# Patient Record
Sex: FEMALE | Race: WHITE | Employment: OTHER | URBAN - NONMETROPOLITAN AREA
[De-identification: names, ages, dates, MRNs, and addresses within clinical notes are randomized per-mention and may not be internally consistent; named-entity substitution may affect disease eponyms.]

---

## 2019-06-01 ENCOUNTER — ANESTHESIA (OUTPATIENT)
Dept: OPERATING ROOM | Age: 84
DRG: 329 | End: 2019-06-01
Payer: MEDICARE

## 2019-06-01 ENCOUNTER — APPOINTMENT (OUTPATIENT)
Dept: GENERAL RADIOLOGY | Age: 84
DRG: 329 | End: 2019-06-01
Attending: SURGERY
Payer: MEDICARE

## 2019-06-01 ENCOUNTER — ANESTHESIA EVENT (OUTPATIENT)
Dept: OPERATING ROOM | Age: 84
DRG: 329 | End: 2019-06-01
Payer: MEDICARE

## 2019-06-01 ENCOUNTER — HOSPITAL ENCOUNTER (INPATIENT)
Age: 84
LOS: 11 days | Discharge: NURSING, REHAB OR LONG-TERM CARE | DRG: 329 | End: 2019-06-12
Attending: SURGERY | Admitting: SURGERY
Payer: MEDICARE

## 2019-06-01 VITALS
SYSTOLIC BLOOD PRESSURE: 112 MMHG | OXYGEN SATURATION: 100 % | RESPIRATION RATE: 5 BRPM | DIASTOLIC BLOOD PRESSURE: 53 MMHG

## 2019-06-01 PROBLEM — K26.5 PERFORATED DUODENAL ULCER (HCC): Status: ACTIVE | Noted: 2019-06-01

## 2019-06-01 PROBLEM — N18.9 CKD (CHRONIC KIDNEY DISEASE): Chronic | Status: ACTIVE | Noted: 2019-06-01

## 2019-06-01 PROBLEM — N17.9 AKI (ACUTE KIDNEY INJURY) (HCC): Status: ACTIVE | Noted: 2019-06-01

## 2019-06-01 PROBLEM — I10 ESSENTIAL HYPERTENSION: Chronic | Status: ACTIVE | Noted: 2019-06-01

## 2019-06-01 PROBLEM — K63.1 BOWEL PERFORATION (HCC): Status: ACTIVE | Noted: 2019-06-01

## 2019-06-01 LAB
ABO/RH: NORMAL
ALBUMIN SERPL-MCNC: 2.5 G/DL (ref 3.5–5.2)
ALP BLD-CCNC: 33 U/L (ref 35–104)
ALT SERPL-CCNC: 7 U/L (ref 5–33)
ANION GAP SERPL CALCULATED.3IONS-SCNC: 11 MMOL/L (ref 7–19)
ANION GAP SERPL CALCULATED.3IONS-SCNC: 11 MMOL/L (ref 7–19)
ANTIBODY SCREEN: NORMAL
AST SERPL-CCNC: 20 U/L (ref 5–32)
ATYPICAL LYMPHOCYTE RELATIVE PERCENT: 2 % (ref 0–8)
BANDED NEUTROPHILS RELATIVE PERCENT: 18 % (ref 0–5)
BASOPHILS ABSOLUTE: 0 K/UL (ref 0–0.2)
BASOPHILS RELATIVE PERCENT: 0 % (ref 0–1)
BILIRUB SERPL-MCNC: 0.5 MG/DL (ref 0.2–1.2)
BUN BLDV-MCNC: 19 MG/DL (ref 8–23)
BUN BLDV-MCNC: 20 MG/DL (ref 8–23)
BURR CELLS: ABNORMAL
CALCIUM SERPL-MCNC: 7.6 MG/DL (ref 8.8–10.2)
CALCIUM SERPL-MCNC: 7.9 MG/DL (ref 8.8–10.2)
CHLORIDE BLD-SCNC: 102 MMOL/L (ref 98–111)
CHLORIDE BLD-SCNC: 97 MMOL/L (ref 98–111)
CO2: 20 MMOL/L (ref 22–29)
CO2: 22 MMOL/L (ref 22–29)
CREAT SERPL-MCNC: 1.6 MG/DL (ref 0.5–0.9)
CREAT SERPL-MCNC: 1.6 MG/DL (ref 0.5–0.9)
EOSINOPHILS ABSOLUTE: 0 K/UL (ref 0–0.6)
EOSINOPHILS RELATIVE PERCENT: 0 % (ref 0–5)
GFR NON-AFRICAN AMERICAN: 30
GFR NON-AFRICAN AMERICAN: 30
GLUCOSE BLD-MCNC: 113 MG/DL (ref 74–109)
GLUCOSE BLD-MCNC: 138 MG/DL (ref 74–109)
HCT VFR BLD CALC: 25.3 % (ref 37–47)
HCT VFR BLD CALC: 28.3 % (ref 37–47)
HEMOGLOBIN: 7.8 G/DL (ref 12–16)
HEMOGLOBIN: 8.9 G/DL (ref 12–16)
HYPOCHROMIA: ABNORMAL
LYMPHOCYTES ABSOLUTE: 1.6 K/UL (ref 1.1–4.5)
LYMPHOCYTES RELATIVE PERCENT: 10 % (ref 20–40)
MCH RBC QN AUTO: 26.1 PG (ref 27–31)
MCH RBC QN AUTO: 26.7 PG (ref 27–31)
MCHC RBC AUTO-ENTMCNC: 30.8 G/DL (ref 33–37)
MCHC RBC AUTO-ENTMCNC: 31.4 G/DL (ref 33–37)
MCV RBC AUTO: 84.6 FL (ref 81–99)
MCV RBC AUTO: 85 FL (ref 81–99)
MONOCYTES ABSOLUTE: 0.5 K/UL (ref 0–0.9)
MONOCYTES RELATIVE PERCENT: 4 % (ref 0–10)
MYELOCYTE PERCENT: 3 %
NEUTROPHILS ABSOLUTE: 11.3 K/UL (ref 1.5–7.5)
NEUTROPHILS RELATIVE PERCENT: 63 % (ref 50–65)
OVALOCYTES: ABNORMAL
PDW BLD-RTO: 13.8 % (ref 11.5–14.5)
PDW BLD-RTO: 13.9 % (ref 11.5–14.5)
PLATELET # BLD: 333 K/UL (ref 130–400)
PLATELET # BLD: 410 K/UL (ref 130–400)
PLATELET SLIDE REVIEW: ADEQUATE
PMV BLD AUTO: 8.6 FL (ref 9.4–12.3)
PMV BLD AUTO: 9 FL (ref 9.4–12.3)
POLYCHROMASIA: ABNORMAL
POTASSIUM REFLEX MAGNESIUM: 4.5 MMOL/L (ref 3.5–5)
POTASSIUM SERPL-SCNC: 3.6 MMOL/L (ref 3.5–4.9)
RBC # BLD: 2.99 M/UL (ref 4.2–5.4)
RBC # BLD: 3.33 M/UL (ref 4.2–5.4)
SODIUM BLD-SCNC: 130 MMOL/L (ref 136–145)
SODIUM BLD-SCNC: 133 MMOL/L (ref 136–145)
TOTAL PROTEIN: 4.6 G/DL (ref 6.6–8.7)
WBC # BLD: 13.4 K/UL (ref 4.8–10.8)
WBC # BLD: 7.5 K/UL (ref 4.8–10.8)

## 2019-06-01 PROCEDURE — 2000000000 HC ICU R&B

## 2019-06-01 PROCEDURE — 7100000000 HC PACU RECOVERY - FIRST 15 MIN: Performed by: SURGERY

## 2019-06-01 PROCEDURE — 6360000002 HC RX W HCPCS: Performed by: NURSE ANESTHETIST, CERTIFIED REGISTERED

## 2019-06-01 PROCEDURE — 6360000002 HC RX W HCPCS: Performed by: SURGERY

## 2019-06-01 PROCEDURE — 3700000000 HC ANESTHESIA ATTENDED CARE: Performed by: SURGERY

## 2019-06-01 PROCEDURE — 2500000003 HC RX 250 WO HCPCS: Performed by: NURSE ANESTHETIST, CERTIFIED REGISTERED

## 2019-06-01 PROCEDURE — 2580000003 HC RX 258: Performed by: HOSPITALIST

## 2019-06-01 PROCEDURE — 36415 COLL VENOUS BLD VENIPUNCTURE: CPT

## 2019-06-01 PROCEDURE — 86850 RBC ANTIBODY SCREEN: CPT

## 2019-06-01 PROCEDURE — 7100000001 HC PACU RECOVERY - ADDTL 15 MIN: Performed by: SURGERY

## 2019-06-01 PROCEDURE — P9045 ALBUMIN (HUMAN), 5%, 250 ML: HCPCS | Performed by: NURSE ANESTHETIST, CERTIFIED REGISTERED

## 2019-06-01 PROCEDURE — 2700000000 HC OXYGEN THERAPY PER DAY

## 2019-06-01 PROCEDURE — 3600000004 HC SURGERY LEVEL 4 BASE: Performed by: SURGERY

## 2019-06-01 PROCEDURE — 43840 GSTRRPHY SUTR DUOL/GSTR ULCR: CPT | Performed by: SURGERY

## 2019-06-01 PROCEDURE — 02HV33Z INSERTION OF INFUSION DEVICE INTO SUPERIOR VENA CAVA, PERCUTANEOUS APPROACH: ICD-10-PCS | Performed by: SURGERY

## 2019-06-01 PROCEDURE — C9113 INJ PANTOPRAZOLE SODIUM, VIA: HCPCS | Performed by: SURGERY

## 2019-06-01 PROCEDURE — 80053 COMPREHEN METABOLIC PANEL: CPT

## 2019-06-01 PROCEDURE — 86900 BLOOD TYPING SEROLOGIC ABO: CPT

## 2019-06-01 PROCEDURE — 2580000003 HC RX 258: Performed by: SURGERY

## 2019-06-01 PROCEDURE — 2580000003 HC RX 258: Performed by: NURSE ANESTHETIST, CERTIFIED REGISTERED

## 2019-06-01 PROCEDURE — 2709999900 HC NON-CHARGEABLE SUPPLY: Performed by: SURGERY

## 2019-06-01 PROCEDURE — 85027 COMPLETE CBC AUTOMATED: CPT

## 2019-06-01 PROCEDURE — 85025 COMPLETE CBC W/AUTO DIFF WBC: CPT

## 2019-06-01 PROCEDURE — 86901 BLOOD TYPING SEROLOGIC RH(D): CPT

## 2019-06-01 PROCEDURE — 71045 X-RAY EXAM CHEST 1 VIEW: CPT

## 2019-06-01 PROCEDURE — 99222 1ST HOSP IP/OBS MODERATE 55: CPT | Performed by: SURGERY

## 2019-06-01 PROCEDURE — 0DU907Z SUPPLEMENT DUODENUM WITH AUTOLOGOUS TISSUE SUBSTITUTE, OPEN APPROACH: ICD-10-PCS | Performed by: SURGERY

## 2019-06-01 PROCEDURE — 2500000003 HC RX 250 WO HCPCS: Performed by: HOSPITALIST

## 2019-06-01 PROCEDURE — 3700000001 HC ADD 15 MINUTES (ANESTHESIA): Performed by: SURGERY

## 2019-06-01 PROCEDURE — 3600000014 HC SURGERY LEVEL 4 ADDTL 15MIN: Performed by: SURGERY

## 2019-06-01 PROCEDURE — 99291 CRITICAL CARE FIRST HOUR: CPT | Performed by: HOSPITALIST

## 2019-06-01 RX ORDER — MORPHINE SULFATE 2 MG/ML
4 INJECTION, SOLUTION INTRAMUSCULAR; INTRAVENOUS EVERY 5 MIN PRN
Status: DISCONTINUED | OUTPATIENT
Start: 2019-06-01 | End: 2019-06-01 | Stop reason: HOSPADM

## 2019-06-01 RX ORDER — DEXTROSE, SODIUM CHLORIDE, SODIUM LACTATE, POTASSIUM CHLORIDE, AND CALCIUM CHLORIDE 5; .6; .31; .03; .02 G/100ML; G/100ML; G/100ML; G/100ML; G/100ML
INJECTION, SOLUTION INTRAVENOUS CONTINUOUS
Status: DISCONTINUED | OUTPATIENT
Start: 2019-06-01 | End: 2019-06-02

## 2019-06-01 RX ORDER — ONDANSETRON 2 MG/ML
4 INJECTION INTRAMUSCULAR; INTRAVENOUS EVERY 6 HOURS PRN
Status: DISCONTINUED | OUTPATIENT
Start: 2019-06-01 | End: 2019-06-12 | Stop reason: HOSPADM

## 2019-06-01 RX ORDER — SUCCINYLCHOLINE/SOD CL,ISO/PF 100 MG/5ML
SYRINGE (ML) INTRAVENOUS PRN
Status: DISCONTINUED | OUTPATIENT
Start: 2019-06-01 | End: 2019-06-01 | Stop reason: SDUPTHER

## 2019-06-01 RX ORDER — SODIUM CHLORIDE 0.9 % (FLUSH) 0.9 %
10 SYRINGE (ML) INJECTION PRN
Status: CANCELLED | OUTPATIENT
Start: 2019-06-01

## 2019-06-01 RX ORDER — ROCURONIUM BROMIDE 10 MG/ML
INJECTION, SOLUTION INTRAVENOUS PRN
Status: DISCONTINUED | OUTPATIENT
Start: 2019-06-01 | End: 2019-06-01 | Stop reason: SDUPTHER

## 2019-06-01 RX ORDER — MEPERIDINE HYDROCHLORIDE 50 MG/ML
12.5 INJECTION INTRAMUSCULAR; INTRAVENOUS; SUBCUTANEOUS EVERY 5 MIN PRN
Status: DISCONTINUED | OUTPATIENT
Start: 2019-06-01 | End: 2019-06-01 | Stop reason: HOSPADM

## 2019-06-01 RX ORDER — CEFAZOLIN SODIUM 1 G/50ML
1 INJECTION, SOLUTION INTRAVENOUS EVERY 8 HOURS
Status: DISCONTINUED | OUTPATIENT
Start: 2019-06-02 | End: 2019-06-03

## 2019-06-01 RX ORDER — METOCLOPRAMIDE HYDROCHLORIDE 5 MG/ML
10 INJECTION INTRAMUSCULAR; INTRAVENOUS
Status: DISCONTINUED | OUTPATIENT
Start: 2019-06-01 | End: 2019-06-01 | Stop reason: HOSPADM

## 2019-06-01 RX ORDER — LIDOCAINE HYDROCHLORIDE 10 MG/ML
INJECTION, SOLUTION INFILTRATION; PERINEURAL PRN
Status: DISCONTINUED | OUTPATIENT
Start: 2019-06-01 | End: 2019-06-01 | Stop reason: SDUPTHER

## 2019-06-01 RX ORDER — PROMETHAZINE HYDROCHLORIDE 25 MG/ML
6.25 INJECTION, SOLUTION INTRAMUSCULAR; INTRAVENOUS
Status: DISCONTINUED | OUTPATIENT
Start: 2019-06-01 | End: 2019-06-01 | Stop reason: HOSPADM

## 2019-06-01 RX ORDER — SODIUM CHLORIDE, SODIUM LACTATE, POTASSIUM CHLORIDE, AND CALCIUM CHLORIDE .6; .31; .03; .02 G/100ML; G/100ML; G/100ML; G/100ML
500 INJECTION, SOLUTION INTRAVENOUS ONCE
Status: COMPLETED | OUTPATIENT
Start: 2019-06-01 | End: 2019-06-01

## 2019-06-01 RX ORDER — SODIUM CHLORIDE 0.9 % (FLUSH) 0.9 %
10 SYRINGE (ML) INJECTION EVERY 12 HOURS SCHEDULED
Status: CANCELLED | OUTPATIENT
Start: 2019-06-01

## 2019-06-01 RX ORDER — SODIUM CHLORIDE 0.9 % (FLUSH) 0.9 %
10 SYRINGE (ML) INJECTION PRN
Status: DISCONTINUED | OUTPATIENT
Start: 2019-06-01 | End: 2019-06-12 | Stop reason: HOSPADM

## 2019-06-01 RX ORDER — ALBUMIN, HUMAN INJ 5% 5 %
SOLUTION INTRAVENOUS PRN
Status: DISCONTINUED | OUTPATIENT
Start: 2019-06-01 | End: 2019-06-01 | Stop reason: SDUPTHER

## 2019-06-01 RX ORDER — FENTANYL CITRATE 50 UG/ML
INJECTION, SOLUTION INTRAMUSCULAR; INTRAVENOUS PRN
Status: DISCONTINUED | OUTPATIENT
Start: 2019-06-01 | End: 2019-06-01 | Stop reason: SDUPTHER

## 2019-06-01 RX ORDER — MORPHINE SULFATE 2 MG/ML
2 INJECTION, SOLUTION INTRAMUSCULAR; INTRAVENOUS EVERY 30 MIN PRN
Status: DISCONTINUED | OUTPATIENT
Start: 2019-06-01 | End: 2019-06-04

## 2019-06-01 RX ORDER — LOSARTAN POTASSIUM 50 MG/1
50 TABLET ORAL DAILY
COMMUNITY

## 2019-06-01 RX ORDER — SODIUM CHLORIDE, SODIUM LACTATE, POTASSIUM CHLORIDE, CALCIUM CHLORIDE 600; 310; 30; 20 MG/100ML; MG/100ML; MG/100ML; MG/100ML
INJECTION, SOLUTION INTRAVENOUS CONTINUOUS
Status: DISCONTINUED | OUTPATIENT
Start: 2019-06-01 | End: 2019-06-01

## 2019-06-01 RX ORDER — 0.9 % SODIUM CHLORIDE 0.9 %
1000 INTRAVENOUS SOLUTION INTRAVENOUS ONCE
Status: COMPLETED | OUTPATIENT
Start: 2019-06-01 | End: 2019-06-01

## 2019-06-01 RX ORDER — SODIUM CHLORIDE 9 MG/ML
INJECTION, SOLUTION INTRAVENOUS CONTINUOUS PRN
Status: DISCONTINUED | OUTPATIENT
Start: 2019-06-01 | End: 2019-06-01 | Stop reason: SDUPTHER

## 2019-06-01 RX ORDER — PROPOFOL 10 MG/ML
INJECTION, EMULSION INTRAVENOUS PRN
Status: DISCONTINUED | OUTPATIENT
Start: 2019-06-01 | End: 2019-06-01 | Stop reason: SDUPTHER

## 2019-06-01 RX ORDER — DEXAMETHASONE SODIUM PHOSPHATE 4 MG/ML
INJECTION, SOLUTION INTRA-ARTICULAR; INTRALESIONAL; INTRAMUSCULAR; INTRAVENOUS; SOFT TISSUE PRN
Status: DISCONTINUED | OUTPATIENT
Start: 2019-06-01 | End: 2019-06-01 | Stop reason: SDUPTHER

## 2019-06-01 RX ORDER — MIDAZOLAM HYDROCHLORIDE 1 MG/ML
INJECTION INTRAMUSCULAR; INTRAVENOUS PRN
Status: DISCONTINUED | OUTPATIENT
Start: 2019-06-01 | End: 2019-06-01 | Stop reason: SDUPTHER

## 2019-06-01 RX ORDER — MORPHINE SULFATE 2 MG/ML
2 INJECTION, SOLUTION INTRAMUSCULAR; INTRAVENOUS EVERY 5 MIN PRN
Status: DISCONTINUED | OUTPATIENT
Start: 2019-06-01 | End: 2019-06-01 | Stop reason: HOSPADM

## 2019-06-01 RX ORDER — METOPROLOL TARTRATE 5 MG/5ML
2.5 INJECTION INTRAVENOUS EVERY 6 HOURS
Status: DISCONTINUED | OUTPATIENT
Start: 2019-06-01 | End: 2019-06-06

## 2019-06-01 RX ORDER — MORPHINE SULFATE 10 MG/ML
INJECTION, SOLUTION INTRAMUSCULAR; INTRAVENOUS PRN
Status: DISCONTINUED | OUTPATIENT
Start: 2019-06-01 | End: 2019-06-01 | Stop reason: SDUPTHER

## 2019-06-01 RX ORDER — 0.9 % SODIUM CHLORIDE 0.9 %
500 INTRAVENOUS SOLUTION INTRAVENOUS ONCE
Status: COMPLETED | OUTPATIENT
Start: 2019-06-01 | End: 2019-06-01

## 2019-06-01 RX ORDER — DIPHENHYDRAMINE HYDROCHLORIDE 50 MG/ML
12.5 INJECTION INTRAMUSCULAR; INTRAVENOUS
Status: DISCONTINUED | OUTPATIENT
Start: 2019-06-01 | End: 2019-06-01 | Stop reason: HOSPADM

## 2019-06-01 RX ORDER — SODIUM CHLORIDE 0.9 % (FLUSH) 0.9 %
10 SYRINGE (ML) INJECTION EVERY 12 HOURS SCHEDULED
Status: DISCONTINUED | OUTPATIENT
Start: 2019-06-01 | End: 2019-06-12 | Stop reason: HOSPADM

## 2019-06-01 RX ADMIN — SODIUM CHLORIDE, SODIUM LACTATE, POTASSIUM CHLORIDE, AND CALCIUM CHLORIDE: 600; 310; 30; 20 INJECTION, SOLUTION INTRAVENOUS at 12:14

## 2019-06-01 RX ADMIN — MORPHINE SULFATE 4 MG: 10 INJECTION INTRAMUSCULAR; INTRAVENOUS; SUBCUTANEOUS at 10:25

## 2019-06-01 RX ADMIN — DEXAMETHASONE SODIUM PHOSPHATE 4 MG: 4 INJECTION, SOLUTION INTRAMUSCULAR; INTRAVENOUS at 08:51

## 2019-06-01 RX ADMIN — ROCURONIUM BROMIDE 30 MG: 10 INJECTION INTRAVENOUS at 08:45

## 2019-06-01 RX ADMIN — SODIUM CHLORIDE: 9 INJECTION, SOLUTION INTRAVENOUS at 08:58

## 2019-06-01 RX ADMIN — SUGAMMADEX 140 MG: 100 INJECTION, SOLUTION INTRAVENOUS at 10:08

## 2019-06-01 RX ADMIN — FENTANYL CITRATE 25 MCG: 50 INJECTION INTRAMUSCULAR; INTRAVENOUS at 09:19

## 2019-06-01 RX ADMIN — PROPOFOL 50 MG: 10 INJECTION, EMULSION INTRAVENOUS at 08:41

## 2019-06-01 RX ADMIN — LIDOCAINE HYDROCHLORIDE 5 ML: 10 INJECTION, SOLUTION INFILTRATION; PERINEURAL at 08:41

## 2019-06-01 RX ADMIN — METOPROLOL TARTRATE 2.5 MG: 5 INJECTION, SOLUTION INTRAVENOUS at 22:44

## 2019-06-01 RX ADMIN — ALBUMIN (HUMAN) 250 ML: 2.5 SOLUTION INTRAVENOUS at 09:32

## 2019-06-01 RX ADMIN — PHENYLEPHRINE HYDROCHLORIDE 100 MCG: 10 INJECTION INTRAVENOUS at 08:44

## 2019-06-01 RX ADMIN — PANTOPRAZOLE SODIUM 8 MG/HR: 40 INJECTION, POWDER, FOR SOLUTION INTRAVENOUS at 22:42

## 2019-06-01 RX ADMIN — ALBUMIN (HUMAN) 250 ML: 2.5 SOLUTION INTRAVENOUS at 09:03

## 2019-06-01 RX ADMIN — CEFAZOLIN 1 G: 1 INJECTION, POWDER, FOR SOLUTION INTRAMUSCULAR; INTRAVENOUS at 08:55

## 2019-06-01 RX ADMIN — MIDAZOLAM 1 MG: 1 INJECTION INTRAMUSCULAR; INTRAVENOUS at 08:35

## 2019-06-01 RX ADMIN — MORPHINE SULFATE 3 MG: 10 INJECTION INTRAMUSCULAR; INTRAVENOUS; SUBCUTANEOUS at 10:21

## 2019-06-01 RX ADMIN — METOPROLOL TARTRATE 2.5 MG: 5 INJECTION, SOLUTION INTRAVENOUS at 17:42

## 2019-06-01 RX ADMIN — SODIUM CHLORIDE 500 ML: 9 INJECTION, SOLUTION INTRAVENOUS at 15:22

## 2019-06-01 RX ADMIN — MORPHINE SULFATE 2 MG: 2 INJECTION, SOLUTION INTRAMUSCULAR; INTRAVENOUS at 15:14

## 2019-06-01 RX ADMIN — SODIUM CHLORIDE, POTASSIUM CHLORIDE, SODIUM LACTATE AND CALCIUM CHLORIDE 500 ML: 600; 310; 30; 20 INJECTION, SOLUTION INTRAVENOUS at 20:41

## 2019-06-01 RX ADMIN — ONDANSETRON 4 MG: 2 INJECTION INTRAMUSCULAR; INTRAVENOUS at 12:12

## 2019-06-01 RX ADMIN — SODIUM CHLORIDE: 9 INJECTION, SOLUTION INTRAVENOUS at 10:04

## 2019-06-01 RX ADMIN — PANTOPRAZOLE SODIUM 8 MG/HR: 40 INJECTION, POWDER, FOR SOLUTION INTRAVENOUS at 14:35

## 2019-06-01 RX ADMIN — ONDANSETRON 4 MG: 2 INJECTION INTRAMUSCULAR; INTRAVENOUS at 06:32

## 2019-06-01 RX ADMIN — SODIUM CHLORIDE, SODIUM LACTATE, POTASSIUM CHLORIDE, AND CALCIUM CHLORIDE: 600; 310; 30; 20 INJECTION, SOLUTION INTRAVENOUS at 05:54

## 2019-06-01 RX ADMIN — PHENYLEPHRINE HYDROCHLORIDE 100 MCG: 10 INJECTION INTRAVENOUS at 08:41

## 2019-06-01 RX ADMIN — SODIUM CHLORIDE, SODIUM LACTATE, POTASSIUM CHLORIDE, AND CALCIUM CHLORIDE: 600; 310; 30; 20 INJECTION, SOLUTION INTRAVENOUS at 09:00

## 2019-06-01 RX ADMIN — MORPHINE SULFATE 2 MG: 2 INJECTION, SOLUTION INTRAMUSCULAR; INTRAVENOUS at 12:11

## 2019-06-01 RX ADMIN — FENTANYL CITRATE 50 MCG: 50 INJECTION INTRAMUSCULAR; INTRAVENOUS at 09:54

## 2019-06-01 RX ADMIN — PHENYLEPHRINE HYDROCHLORIDE 100 MCG: 10 INJECTION INTRAVENOUS at 09:06

## 2019-06-01 RX ADMIN — MORPHINE SULFATE 3 MG: 10 INJECTION INTRAMUSCULAR; INTRAVENOUS; SUBCUTANEOUS at 10:16

## 2019-06-01 RX ADMIN — Medication 10 ML: at 20:43

## 2019-06-01 RX ADMIN — FENTANYL CITRATE 50 MCG: 50 INJECTION INTRAMUSCULAR; INTRAVENOUS at 09:42

## 2019-06-01 RX ADMIN — SODIUM CHLORIDE, SODIUM LACTATE, POTASSIUM CHLORIDE, CALCIUM CHLORIDE AND DEXTROSE MONOHYDRATE: 5; 600; 310; 30; 20 INJECTION, SOLUTION INTRAVENOUS at 19:06

## 2019-06-01 RX ADMIN — MORPHINE SULFATE 2 MG: 2 INJECTION, SOLUTION INTRAMUSCULAR; INTRAVENOUS at 06:32

## 2019-06-01 RX ADMIN — SODIUM CHLORIDE, SODIUM LACTATE, POTASSIUM CHLORIDE, AND CALCIUM CHLORIDE: 600; 310; 30; 20 INJECTION, SOLUTION INTRAVENOUS at 11:54

## 2019-06-01 RX ADMIN — PHENYLEPHRINE HYDROCHLORIDE 200 MCG: 10 INJECTION INTRAVENOUS at 08:48

## 2019-06-01 RX ADMIN — Medication 80 MG: at 08:41

## 2019-06-01 RX ADMIN — FENTANYL CITRATE 25 MCG: 50 INJECTION INTRAMUSCULAR; INTRAVENOUS at 09:24

## 2019-06-01 RX ADMIN — SODIUM CHLORIDE 1000 ML: 9 INJECTION, SOLUTION INTRAVENOUS at 07:23

## 2019-06-01 ASSESSMENT — PAIN DESCRIPTION - LOCATION
LOCATION: ABDOMEN
LOCATION: ABDOMEN

## 2019-06-01 ASSESSMENT — PAIN DESCRIPTION - PAIN TYPE
TYPE: ACUTE PAIN
TYPE: ACUTE PAIN

## 2019-06-01 ASSESSMENT — ENCOUNTER SYMPTOMS
NAUSEA: 1
BLOOD IN STOOL: 0
CHEST TIGHTNESS: 0
CONSTIPATION: 0
COLOR CHANGE: 0
SORE THROAT: 0
ABDOMINAL DISTENTION: 1
COUGH: 0
VOMITING: 1
ABDOMINAL PAIN: 1
WHEEZING: 0
SINUS PRESSURE: 0
TROUBLE SWALLOWING: 0
SHORTNESS OF BREATH: 0
DIARRHEA: 0
BACK PAIN: 1

## 2019-06-01 ASSESSMENT — PAIN SCALES - GENERAL
PAINLEVEL_OUTOF10: 3
PAINLEVEL_OUTOF10: 0
PAINLEVEL_OUTOF10: 0
PAINLEVEL_OUTOF10: 6
PAINLEVEL_OUTOF10: 5
PAINLEVEL_OUTOF10: 7
PAINLEVEL_OUTOF10: 2
PAINLEVEL_OUTOF10: 10
PAINLEVEL_OUTOF10: 7

## 2019-06-01 ASSESSMENT — PAIN SCALES - WONG BAKER
WONGBAKER_NUMERICALRESPONSE: 2
WONGBAKER_NUMERICALRESPONSE: 4

## 2019-06-01 NOTE — CONSULTS
ADMITTED: [id number]    PCP:  Maru Kelly  Admitting Doctor / Primary Team: Dr. Adrianna Macdonald of General Surgery  Internal Medicine Hopsitalist Team Consulted for: Medical Management    CODE STATUS: Full Code  HEALTH CARE PROXY: her daughter, Mrs. Oksana Damon:    CC: consulted for medical management post bowel perforation repair surgery  No chief complaint on file. HPI:  Mrs. Tristen Avila is a pleasant 80year old  american lady from home last in a hospital when she had delivered her child here many years ago. She came in for pain across her chest. She states that she thought she was having a heart attack as she was unable to eat and had pain across her chest. She also tells us, as she told her surgeon,  that she had the start of sciatica which was sudden and nontraumatic and she began to take up to 4 tabs a day of ibuprofen. She was admitted, after she went to [facility] and was told that they thought that she needed to have abdominal surgery. She has asked them to transfer her to [facility] at that time. She was accepted by Dr. Adrianna Macdonald at that time for surgery. ROS:  She also endorses: weak, nausea with emesis, fatigue, bilateral shoulder pains, constipation, and near syncope. She also denies: fevers, chills, bed drenching night sweats, malaise, confusion, chest pain, dysuria, and diaphoresis. Past Medical History:   Diagnosis Date    Bowel perforation [facility]), s/p repair on 01JUN19     CKD (chronic kidney disease), baseline stage unknown     Essential hypertension      Past Psychiatric History:  Denies any    Surgical History:  History reviewed. No pertinent surgical history.   Today's was listable unde PMH only in this EMR    Social History     Tobacco History     Smoking Status  Never Smoker    Smokeless Tobacco Use  Never Used          Alcohol History     Alcohol Use Status  Not Currently          Drug Use     Drug Use Status  Never          Sexual Activity Sexually Active  Not Asked            CODE STATUS: Full Code  HEALTH CARE PROXY: her daughter, Mrs. Marianne Bagley: lives in a private home, no stairs inside home, lives alone, has 1 step in to home  AMBULATES: ambulates independantly    Family History   Problem Relation Age of Onset    No Known Problems Mother          of smoke inhalation in fire    Cancer Father          of cancer of prostate    Other Daughter         sciatica    No Known Problems Daughter     No Known Problems Son     No Known Problems Son         has back surgery     Allergies:  No Known Allergies    Current Facility-Administered Medications   Medication Dose Route Frequency Provider Last Rate Last Dose    lactated ringers infusion   Intravenous Continuous Becki Bruno  mL/hr at 19 1214      morphine (PF) injection 2 mg  2 mg Intravenous Q30 Min PRN Becki Bruno MD   2 mg at 19 1514    ondansetron (ZOFRAN) injection 4 mg  4 mg Intravenous Q6H PRN Becki Bruno MD   4 mg at 19 1212    [START ON 2019] ceFAZolin (ANCEF) 1 g in dextrose 5 % 50 mL IVPB (premix)  1 g Intravenous Q8H Becki Bruno MD        sodium chloride flush 0.9 % injection 10 mL  10 mL Intravenous 2 times per day Becki Bruno MD        sodium chloride flush 0.9 % injection 10 mL  10 mL Intravenous PRN Becki Bruno MD        [START ON 2019] enoxaparin (LOVENOX) injection 40 mg  40 mg Subcutaneous Daily Becki Bruno MD        pantoprazole (PROTONIX) 80 mg in sodium chloride 0.9 % 100 mL infusion  8 mg/hr Intravenous Continuous Becki Bruno MD 10 mL/hr at 19 1435 8 mg/hr at 19 1435    0.9 % sodium chloride bolus  500 mL Intravenous Once Becki Bruno  mL/hr at 19 1522 500 mL at 19 1522    metoprolol (LOPRESSOR) injection 2.5 mg  2.5 mg Intravenous Q6H Mary Washington MD             OBJECTIVE:    Vitals:    19 1515   BP: (!) 103/52   Pulse: 109 Resp: 15   Temp:    SpO2: 94%     BP (!) 103/52   Pulse 109   Temp 97.9 °F (36.6 °C) (Temporal)   Resp 15   Ht 5' 4\" (1.626 m)   Wt 132 lb (59.9 kg)   SpO2 94%   BMI 22.66 kg/m²     Physical Exam   Constitutional: She appears well-developed and well-nourished. No distress. HENT:   Head: Normocephalic and atraumatic. Eyes: Right eye exhibits no discharge. Left eye exhibits no discharge. No scleral icterus. Neck: Neck supple. No tracheal deviation present. no bruit on left, Right side has RIJ present   Cardiovascular: Normal rate and regular rhythm. Exam reveals no gallop and no friction rub. No murmur heard. Pulmonary/Chest: Effort normal. No stridor. No respiratory distress. She has no wheezes. She has no rales. She exhibits no tenderness. B/l equivalent Breath Sounds   Abdominal: She exhibits no distension. There is no rebound and no guarding. A Bowel sound was heard still despite surgery   Musculoskeletal: She exhibits no edema, tenderness or deformity. Neurological: She is alert. Skin: Skin is warm and dry. She is not diaphoretic. Abdominal wound midline looks pristine, has drain in place, dressing on right side where drain exits looks clean   Psychiatric: She has a normal mood and affect. Her behavior is normal.   Vital Signs as per above, reviewed in EMR and on bedside monitor    LABS:  Results for Janis Higginbotham (MRN [id number]) as of 6/1/2019 17:03   Ref.  Range 6/1/2019 09:05   Sodium Latest Ref Range: 136 - 145 mmol/L 130 (L)   Potassium Latest Ref Range: 3.5 - 4.9 mmol/L 3.6   Chloride Latest Ref Range: 98 - 111 mmol/L 97 (L)   CO2 Latest Ref Range: 22 - 29 mmol/L 22   BUN Latest Ref Range: 8 - 23 mg/dL 19   Creatinine Latest Ref Range: 0.5 - 0.9 mg/dL 1.6 (H)   Anion Gap Latest Ref Range: 7 - 19 mmol/L 11   GFR Non- Latest Ref Range: >60  30 (A)   Glucose Latest Ref Range: 74 - 109 mg/dL 138 (H)   Calcium Latest Ref Range: 8.8 - 10.2 mg/dL 7.9 (L)   Total Protein Latest Ref Range: 6.6 - 8.7 g/dL 4.6 (L)   Albumin Latest Ref Range: 3.5 - 5.2 g/dL 2.5 (L)   Alk Phos Latest Ref Range: 35 - 104 U/L 33 (L)   ALT Latest Ref Range: 5 - 33 U/L 7   AST Latest Ref Range: 5 - 32 U/L 20   Bilirubin Latest Ref Range: 0.2 - 1.2 mg/dL 0.5   WBC Latest Ref Range: 4.8 - 10.8 K/uL 7.5   RBC Latest Ref Range: 4.20 - 5.40 M/uL 3.33 (L)   Hemoglobin Quant Latest Ref Range: 12.0 - 16.0 g/dL 8.9 (LL)   Hematocrit Latest Ref Range: 37.0 - 47.0 % 28.3 (L)   MCV Latest Ref Range: 81.0 - 99.0 fL 85.0   MCH Latest Ref Range: 27.0 - 31.0 pg 26.7 (L)   MCHC Latest Ref Range: 33.0 - 37.0 g/dL 31.4 (L)   MPV Latest Ref Range: 9.4 - 12.3 fL 9.0 (L)   RDW Latest Ref Range: 11.5 - 14.5 % 13.8   Platelet Count Latest Ref Range: 130 - 400 K/uL 410 (H)   ABO Rh Unknown O POS   Antibody Screen Unknown NEG     DIAGNOSTICS:  CXR 1-View portable:  Narrative   EXAMINATION: Chest one view 6/1/2019   HISTORY: Line placement   FINDINGS: Upright frontal projection of the chest demonstrates   pulmonary venous hypertension with interstitial edema and small   effusions. An NG tube has been advanced with the tip in the distal   body of the stomach. A right IJ deep line has been placed with no   evidence of complications.       Impression   1.. NG tube and right IJ deep line placed with no complications. They   are well-positioned. 2. Pulmonary venous hypertension with interstitial edema and small   effusions.    Signed by Dr Nadia Alonso on 6/1/2019 10:49 AM         ASESSMENTS & RECOMMENDATIONS:    JEFF on CKD:   LR at 125cc/h  basline unknown  HOLD ARB    HTN:   Normotensive without her ARB so will not sub amlodipine etc  Metoprolol will be subbed with 2.5mg IV Q5h in place of tartrate BiD as was tachycardic      Bowel Perforation s/p Sx:  As per primary team - Dr. Emily Go  Pain meds / Diet / ABx / GI PPx / Antiemetic  (placed on Cefazolin which I concur with, would consider Merum or Flagyl with Cipro if signs of infection present)    DVT PPx: Lovenox SQ QDay from tomorrow as per Sx, I concur but given JEFF  With CrCl 21 would use 30 of lovenox not 40 - will ask pharmacy to dose adjust all meds as indicated      Critical Care time of >45 minutes    Patient Active Problem List   Diagnosis    Perforated duodenal ulcer ([facility] [address]    Essential hypertension    CKD (chronic kidney disease), baseline stage unknown    JEFF (acute kidney injury) ([facility] [address]    Bowel perforation ([address] s/p repair now on 01JUN19         ADDENDUM:  Case d/w her RN last night  Concern for low urine is warranted  Suspect possible ATN versus a simple prerenal azotemia  Renal US complete ordered by tele  Paired electrolytes to be drawn off port from bladder of [place] NOT the bag, to go with AM BMP ordered  Anticipating needing renal's expertise

## 2019-06-01 NOTE — PROGRESS NOTES
Nutrition Assessment    Type and Reason for Visit: Initial, Positive Nutrition Screen    Nutrition Assessment: Pt is nutritionally compromised AEB inadequate oral intake. Pt is at risk for further compromise d/t NPO status and c/o N/V. Will monitor nutrition progression and implement nutrition intervention as needed. Malnutrition Assessment:  · Malnutrition Status: At risk for malnutrition  · Context: Acute illness or injury  · Findings of the 6 clinical characteristics of malnutrition (Minimum of 2 out of 6 clinical characteristics is required to make the diagnosis of moderate or severe Protein Calorie Malnutrition based on AND/ASPEN Guidelines):  1. Energy Intake-Less than or equal to 50% of estimated energy requirement, Unable to assess    2. Weight Loss-No significant weight loss,    3. Fat Loss-No significant subcutaneous fat loss,    4. Muscle Loss-No significant muscle mass loss,    5. Fluid Accumulation-No significant fluid accumulation,    6.  Strength-Not measured    Nutrition Risk Level:  Moderate    Nutrient Needs:  · Estimated Daily Total Kcal: 4303-4278 kcals/day  · Estimated Daily Protein (g):  g/PRO/day  · Estimated Daily Total Fluid (ml/day): 8761-6132 mL/day    Nutrition Diagnosis:   · Problem: Inadequate oral intake  · Etiology: related to Acute injury/trauma     Signs and symptoms:  as evidenced by NPO status due to medical condition    Objective Information:  · Current Nutrition Therapies:  · Oral Diet Orders: NPO   · Anthropometric Measures:  · Ht: 5' 4\" (162.6 cm)   · Current Body Wt: 132 lb (59.9 kg)  · BMI Classification: BMI 18.5 - 24.9 Normal Weight    Nutrition Interventions:   Continue NPO  Continued Inpatient Monitoring    Nutrition Evaluation:   · Evaluation: Goals set   · Goals: Pt will progress to an oral diet with no s/s intolerance    · Monitoring: Nutrition Progression, Weight, Pertinent Labs      Electronically signed by Nhung Pagan RD, LD on 6/1/19 at 8: 07 AM    Contact Number: [phone]

## 2019-06-01 NOTE — H&P
[facility] General Surgery     History and Physical    Patient ID: [place]  80 y.o.  female  YOB: 1931    Admitting Diagnosis: abdominal pain    Subjective:      Ms. Amrit Montesinos is a very pleasant 40-year-old woman who is in her usual state of good health until about a month ago. At that time she developed \"sciatica\" and began taking ibuprofen. Her pain got to the point where she was taking 2 tablets to 3 and sometimes 4 times per day. This helped but did not completely alleviate the pain. Beginning about 3 days ago she developed upper abdominal pain. This is progressed to the point where it was severe enough last night to present to the emergency department at [facility]. Workup there showed her to have a tender abdomen. CT scan documents inflammatory changes in the upper abdomen along with free fluid and free air. Given lack of a surgeon she was transferred here for further care. This morning she notes that her pain persists. She reports ongoing nausea and vomiting. She denies fever. She reports that she's had bowel movements each of the last several days and that these are unchanged from normal.     PMH:   Hypertension      Chronic anemia   Osteoporosis    No past surgical history on file.   Current Facility-Administered Medications   Medication Dose Route Frequency Provider Last Rate Last Dose    lactated ringers infusion   Intravenous Continuous Michelle Ramirez  mL/hr at 06/01/19 0554      morphine (PF) injection 2 mg  2 mg Intravenous Q30 Min PRN Michelle Ramirez MD   2 mg at 06/01/19 0095    ondansetron (ZOFRAN) injection 4 mg  4 mg Intravenous Q6H PRN Michelle Ramirez MD   4 mg at 06/01/19 5381    0.9 % sodium chloride bolus  1,000 mL Intravenous Once Michelle Ramirez  mL/hr at 06/01/19 0723 1,000 mL at 06/01/19 0723    ceFAZolin (ANCEF) 1 g in sterile water 10 mL IV syringe  1 g Intravenous Mu Edwards MD present. No thyromegaly present. Cardiovascular: Normal rate and regular rhythm. Murmur heard. Pulmonary/Chest: Effort normal and breath sounds normal. She has no wheezes. She has no rales. Abdominal:   Abdomen is mildly distended. There is diffuse tenderness. She has guarding and early rebound. No discrete mass appreciated. No organomegaly noted. Bowel sounds are hypoactive. Musculoskeletal: Normal range of motion. She exhibits no edema. Neurological: She is alert and oriented to person, place, and time. Skin: Skin is warm and dry. Psychiatric: She has a normal mood and affect. Her behavior is normal. Judgment and thought content normal.   Vitals reviewed. Data:  CBC: WBC 10,400 hemoglobin 10.0 hematocrit 30.8 platelets 775,162 Neutrophil 79  lymphocytes 15 monocyte 5  BMP: Sodium 127 potassium 3.3 chloride 92 CO2 26 BUN 12 creatinine 0.6 glucose 1:30 calcium 8.8   LFT: AST 10 ALT 8 alkaline phosphatase 47 bilirubin 0.6    CT scan abdomen and pelvis from [facility] 6/1/2019: To my reading there are inflammatory changes in the upper abdomen centered about the stomach and duodenum. In addition there is a large volume of free fluid and a moderate volume of free intraperitoneal air. The liver is otherwise unremarkable. The colon shows diverticulosis but no evidence of diverticulitis. Assessment:     1) Perforated hollow viscus. I suspect that she has a perforated ulcer given her history of ibuprofen use, her clinical exam and CT scan. 2) Hypertension on medical therapy  3) Lumbar spine pain, chronic  4) Advanced age  11) Dehydration and associated electrolyte changes due to recent nausea and vomiting      Plan:     1) Volume resuscitation  2) Ancef 1 g IV now and every 8 hours  3) Proceed with laparotomy and repair of her perforation. The rationale for the procedure was explained. Risks, benefits, alternatives and expected recovery were reviewed.  Questions were encouraged and answered to the patient's satisfaction. Following this she wishes to proceed to surgery.       Electronically signed by Paris Berry MD on 6/1/2019 at 7:46 AM

## 2019-06-01 NOTE — ANESTHESIA PROCEDURE NOTES
Central Venous Line:    A central venous line was placed using ultrasound guidance, in the OR for the following indication(s): central venous access and CVP monitoring. 6/1/2019 8:45 AM    Sterility preparation included the following: hand hygiene performed prior to procedure, maximum sterile barriers used and sterile technique used to drape from head to toe. The patient was placed in supine position. The right internal jugular vein was prepped. The site was prepped with Chloraprep. A 7 Fr (size), 16 (length), introducer triple lumen was placed. During the procedure, the following specific steps were taken: target vein identified, needle advanced into vein and blood aspirated and guidewire advanced into vein. Intravenous verification was obtained by ultrasound and venous blood return. Post insertion care included: all ports aspirated, all ports flushed easily, guidewire removed intact, Biopatch applied, line sutured in place and dressing applied. During the procedure the patient experienced: patient tolerated procedure well with no complications.       Insertion site scrubbed per usage guidelines?: Yes  Skin prep agent dried for 3 minutes prior to procedure?:yes  Anesthesia type: general..No  Staffing  Anesthesiologist: Gene Cespedes DO  Performed: Anesthesiologist   Preanesthetic Checklist  Completed: patient identified, site marked, surgical consent, pre-op evaluation, timeout performed, IV checked, risks and benefits discussed, monitors and equipment checked, anesthesia consent given, oxygen available and patient being monitored

## 2019-06-01 NOTE — PLAN OF CARE
Problem: Pain:  Description  Pain management should include both nonpharmacologic and pharmacologic interventions.   Goal: Pain level will decrease  Description  Pain level will decrease  6/1/2019 1526 by Ramana Oconnell RN  Outcome: Ongoing  6/1/2019 0550 by Laurie Kerr RN  Outcome: Ongoing  Goal: Control of acute pain  Description  Control of acute pain  Outcome: Ongoing  Goal: Control of chronic pain  Description  Control of chronic pain  Outcome: Ongoing     Problem: Falls - Risk of:  Goal: Will remain free from falls  Description  Will remain free from falls  Outcome: Ongoing  Goal: Absence of physical injury  Description  Absence of physical injury  Outcome: Ongoing

## 2019-06-01 NOTE — OP NOTE
[facility] [address] [facility]                                OPERATIVE REPORT    PATIENT NAME: Subha Bob                    :        1931  MED REC NO:   [id number]                              ROOM:  ACCOUNT NO:   [de-identified]                           ADMIT DATE: 2019  PROVIDER:     Heriberto Coombs MD    DATE OF PROCEDURE:  2019    PREOPERATIVE DIAGNOSIS:  Perforated hollow viscus. POSTOPERATIVE DIAGNOSIS:  Perforated duodenal ulcer. PROCEDURE:  Laparotomy with Antonette Dame patch repair of perforated duodenal  ulcer. SURGEON:  Heriberto Coombs MD    INDICATION:  The patient is a very pleasant 51-year-old woman who has  been having problem with \"sciatica\" for the past month. She has been  taking two ibuprofen tablets three and sometimes four times per day  since then. About 2 days ago, she developed upper abdominal pain,  eventually presented through the [facility] ER last  night with a CT scan showing free air and a large volume of free fluid. She was transferred here for surgical care and is taken to the  operating room for exploration of a presumed perforated hollow viscus. OPERATIVE PROCEDURE:  The patient was taken to the main operating room,  placed on the operating table supine. After the induction of adequate  general endotracheal anesthesia, a right internal jugular central venous  catheter was placed by Anesthesia. The abdomen was then prepped and  draped to a sterile field. Time-out was undertaken. Upper midline skin incision was made and carried through the  subcutaneous tissue to the midline fascia. Fascia and peritoneum were  incised and the abdominal cavity entered. We removed about 1000 mL of  green-brown succus consistent with gastric fluid.   Once that fluid was  removed, I explored the upper abdomen and found a 2 to 2.5 cm perforated  ulcer on the anterior wall of the

## 2019-06-01 NOTE — PLAN OF CARE
Nutrition Problem: Inadequate oral intake  Intervention: Food and/or Nutrient Delivery: Continue NPO  Nutritional Goals: Pt will progress to an oral diet with no s/s intolerance

## 2019-06-01 NOTE — ANESTHESIA POSTPROCEDURE EVALUATION
Department of Anesthesiology  Postprocedure Note    Patient: Calli Ramos  MRN: [id number]  YOB: 1931  Date of evaluation: 6/1/2019  Time:  10:30 AM     Procedure Summary     Date:  06/01/19 Room / Location:  [facility] OR  / [facility] OR    Anesthesia Start:  0837 Anesthesia Stop:      Procedure:  CENTRAL LINE PLACEMENT LAPAROTOMY EXPLORATORY GRAM PATCH REPAIR OF DUODENAL ULCER (N/A ) Diagnosis:  (Perforated hollow viscus)    Surgeon:  Melisa Mcgovern MD Responsible Provider:  RONNIE Gonzalez CRNA    Anesthesia Type:  general ASA Status:  3 - Emergent          Anesthesia Type: general    Heide Phase I: Heide Score: 8    Heide Phase II:      Last vitals: Reviewed and per EMR flowsheets.        Anesthesia Post Evaluation    Patient location during evaluation: PACU  Patient participation: complete - patient participated  Level of consciousness: awake and alert  Pain score: 2  Airway patency: patent  Nausea & Vomiting: no nausea and no vomiting  Complications: no  Cardiovascular status: blood pressure returned to baseline and hemodynamically stable  Respiratory status: acceptable, face mask and spontaneous ventilation  Hydration status: stable

## 2019-06-01 NOTE — ANESTHESIA PRE PROCEDURE
Department of Anesthesiology  Preprocedure Note       Name:  [place]   Age:  80 y.o.  :  11/3/1931                                          MRN:  [id number]         Date:  2019      Surgeon: Emir Haney):  Estee Ontiveros MD    Procedure: LAPAROTOMY EXPLORATORY (N/A )    Medications prior to admission:   Prior to Admission medications    Medication Sig Start Date End Date Taking? Authorizing Provider   METOPROLOL TARTRATE PO Take by mouth Indications: unsure of dosage   Yes Historical Provider, MD       Current medications:    Current Facility-Administered Medications   Medication Dose Route Frequency Provider Last Rate Last Dose    lactated ringers infusion   Intravenous Continuous Estee Ontiveros  mL/hr at 19 0554      morphine (PF) injection 2 mg  2 mg Intravenous Q30 Min PRN Estee Ontiveros MD   2 mg at 19 8173    ondansetron (ZOFRAN) injection 4 mg  4 mg Intravenous Q6H PRN Estee Ontiveros MD   4 mg at 19 9069    0.9 % sodium chloride bolus  1,000 mL Intravenous Once Estee Ontiveros  mL/hr at 19 0723 1,000 mL at 19 0723    ceFAZolin (ANCEF) 1 g in sterile water 10 mL IV syringe  1 g Intravenous Q8H Estee Ontiveros MD       Raymond Newtonamp [START ON 2019] ceFAZolin (ANCEF) 1 g in dextrose 5 % 50 mL IVPB (premix)  1 g Intravenous Q8H Estee Ontiveros MD           Allergies:  No Known Allergies    Problem List:  There is no problem list on file for this patient. Past Medical History:  No past medical history on file. Past Surgical History:  No past surgical history on file.     Social History:    Social History     Tobacco Use    Smoking status: Never Smoker    Smokeless tobacco: Never Used   Substance Use Topics    Alcohol use: Not on file                                Counseling given: Not Answered      Vital Signs (Current):   Vitals:    19 0500 19 0544 19 0550 19 0648   BP: 103/61   (!) 85/57   Pulse: 104  104 94 Resp: 20   18   Temp: 96.6 °F (35.9 °C)   97.4 °F (36.3 °C)   TempSrc: Temporal   Temporal   SpO2:    90%   Weight:  132 lb (59.9 kg)     Height:  5' 4\" (1.626 m)                                                BP Readings from Last 3 Encounters:   06/01/19 (!) 85/57       NPO Status:                                                                                 BMI:   Wt Readings from Last 3 Encounters:   06/01/19 132 lb (59.9 kg)     Body mass index is 22.66 kg/m². CBC: No results found for: WBC, RBC, HGB, HCT, MCV, RDW, PLT    CMP: No results found for: NA, K, CL, CO2, BUN, CREATININE, GFRAA, AGRATIO, LABGLOM, GLUCOSE, PROT, CALCIUM, BILITOT, ALKPHOS, AST, ALT    POC Tests: No results for input(s): POCGLU, POCNA, POCK, POCCL, POCBUN, POCHEMO, POCHCT in the last 72 hours. Coags: No results found for: PROTIME, INR, APTT    HCG (If Applicable): No results found for: PREGTESTUR, PREGSERUM, HCG, HCGQUANT     ABGs: No results found for: PHART, PO2ART, VNL4VHY, LOO0XGR, BEART, Y5XHELNS     Type & Screen (If Applicable):  No results found for: [facility]    Anesthesia Evaluation  Patient summary reviewed and Nursing notes reviewed no history of anesthetic complications:   Airway: Mallampati: II  TM distance: >3 FB   Neck ROM: full  Mouth opening: > = 3 FB Dental:          Pulmonary:Negative Pulmonary ROS and normal exam                               Cardiovascular:Negative CV ROS  Exercise tolerance: good (>4 METS),         ECG reviewed               Beta Blocker:  Dose within 24 Hrs         Neuro/Psych:   Negative Neuro/Psych ROS              GI/Hepatic/Renal:   (+) PUD,          ROS comment: Free air in abdomen, perforated ulcer secondary to NSAID use. +N/V. Endo/Other:    (+) blood dyscrasia: anemia:., .                 Abdominal:       Abdomen: tender. Vascular: negative vascular ROS.                                        Anesthesia Plan      general     ASA 3 - emergent     (Will draw type and

## 2019-06-02 ENCOUNTER — APPOINTMENT (OUTPATIENT)
Dept: ULTRASOUND IMAGING | Age: 84
DRG: 329 | End: 2019-06-02
Attending: SURGERY
Payer: MEDICARE

## 2019-06-02 LAB
ANION GAP SERPL CALCULATED.3IONS-SCNC: 10 MMOL/L (ref 7–19)
BANDED NEUTROPHILS RELATIVE PERCENT: 4 % (ref 0–5)
BASOPHILS ABSOLUTE: 0 K/UL (ref 0–0.2)
BASOPHILS RELATIVE PERCENT: 0 % (ref 0–1)
BUN BLDV-MCNC: 22 MG/DL (ref 8–23)
CALCIUM SERPL-MCNC: 8 MG/DL (ref 8.8–10.2)
CHLORIDE BLD-SCNC: 103 MMOL/L (ref 98–111)
CO2: 20 MMOL/L (ref 22–29)
CREAT SERPL-MCNC: 1.9 MG/DL (ref 0.5–0.9)
CREATININE URINE: 94.2 MG/DL (ref 4.2–622)
EOSINOPHILS ABSOLUTE: 0 K/UL (ref 0–0.6)
EOSINOPHILS RELATIVE PERCENT: 0 % (ref 0–5)
GFR NON-AFRICAN AMERICAN: 25
GLUCOSE BLD-MCNC: 135 MG/DL (ref 74–109)
HCT VFR BLD CALC: 24.5 % (ref 37–47)
HEMOGLOBIN: 7.6 G/DL (ref 12–16)
HYPOCHROMIA: ABNORMAL
LYMPHOCYTES ABSOLUTE: 1.3 K/UL (ref 1.1–4.5)
LYMPHOCYTES RELATIVE PERCENT: 8 % (ref 20–40)
MAGNESIUM: 1.3 MG/DL (ref 1.6–2.4)
MCH RBC QN AUTO: 26.5 PG (ref 27–31)
MCHC RBC AUTO-ENTMCNC: 31 G/DL (ref 33–37)
MCV RBC AUTO: 85.4 FL (ref 81–99)
MONOCYTES ABSOLUTE: 0 K/UL (ref 0–0.9)
MONOCYTES RELATIVE PERCENT: 0 % (ref 0–10)
NEUTROPHILS ABSOLUTE: 14.5 K/UL (ref 1.5–7.5)
NEUTROPHILS RELATIVE PERCENT: 88 % (ref 50–65)
PDW BLD-RTO: 14 % (ref 11.5–14.5)
PLATELET # BLD: 339 K/UL (ref 130–400)
PLATELET SLIDE REVIEW: ADEQUATE
PMV BLD AUTO: 8.7 FL (ref 9.4–12.3)
POTASSIUM SERPL-SCNC: 4.3 MMOL/L (ref 3.5–5)
RBC # BLD: 2.87 M/UL (ref 4.2–5.4)
SODIUM BLD-SCNC: 133 MMOL/L (ref 136–145)
SODIUM URINE: 21 MMOL/L
WBC # BLD: 15.8 K/UL (ref 4.8–10.8)

## 2019-06-02 PROCEDURE — 2580000003 HC RX 258: Performed by: INTERNAL MEDICINE

## 2019-06-02 PROCEDURE — 2000000000 HC ICU R&B

## 2019-06-02 PROCEDURE — 84300 ASSAY OF URINE SODIUM: CPT

## 2019-06-02 PROCEDURE — 2580000003 HC RX 258: Performed by: HOSPITALIST

## 2019-06-02 PROCEDURE — 99024 POSTOP FOLLOW-UP VISIT: CPT | Performed by: SURGERY

## 2019-06-02 PROCEDURE — 76770 US EXAM ABDO BACK WALL COMP: CPT

## 2019-06-02 PROCEDURE — 83735 ASSAY OF MAGNESIUM: CPT

## 2019-06-02 PROCEDURE — C9113 INJ PANTOPRAZOLE SODIUM, VIA: HCPCS | Performed by: SURGERY

## 2019-06-02 PROCEDURE — 82570 ASSAY OF URINE CREATININE: CPT

## 2019-06-02 PROCEDURE — 2500000003 HC RX 250 WO HCPCS: Performed by: HOSPITALIST

## 2019-06-02 PROCEDURE — 80048 BASIC METABOLIC PNL TOTAL CA: CPT

## 2019-06-02 PROCEDURE — 99291 CRITICAL CARE FIRST HOUR: CPT | Performed by: HOSPITALIST

## 2019-06-02 PROCEDURE — 85025 COMPLETE CBC W/AUTO DIFF WBC: CPT

## 2019-06-02 PROCEDURE — 2580000003 HC RX 258: Performed by: SURGERY

## 2019-06-02 PROCEDURE — 2700000000 HC OXYGEN THERAPY PER DAY

## 2019-06-02 PROCEDURE — 2500000003 HC RX 250 WO HCPCS: Performed by: INTERNAL MEDICINE

## 2019-06-02 PROCEDURE — 36592 COLLECT BLOOD FROM PICC: CPT

## 2019-06-02 PROCEDURE — 6360000002 HC RX W HCPCS: Performed by: SURGERY

## 2019-06-02 RX ORDER — 0.9 % SODIUM CHLORIDE 0.9 %
1000 INTRAVENOUS SOLUTION INTRAVENOUS ONCE
Status: COMPLETED | OUTPATIENT
Start: 2019-06-02 | End: 2019-06-02

## 2019-06-02 RX ADMIN — METOPROLOL TARTRATE 2.5 MG: 5 INJECTION, SOLUTION INTRAVENOUS at 16:26

## 2019-06-02 RX ADMIN — SODIUM CHLORIDE, SODIUM LACTATE, POTASSIUM CHLORIDE, CALCIUM CHLORIDE AND DEXTROSE MONOHYDRATE: 5; 600; 310; 30; 20 INJECTION, SOLUTION INTRAVENOUS at 20:29

## 2019-06-02 RX ADMIN — METOPROLOL TARTRATE 2.5 MG: 5 INJECTION, SOLUTION INTRAVENOUS at 22:45

## 2019-06-02 RX ADMIN — MORPHINE SULFATE 2 MG: 2 INJECTION, SOLUTION INTRAMUSCULAR; INTRAVENOUS at 15:01

## 2019-06-02 RX ADMIN — MORPHINE SULFATE 2 MG: 2 INJECTION, SOLUTION INTRAMUSCULAR; INTRAVENOUS at 04:28

## 2019-06-02 RX ADMIN — ENOXAPARIN SODIUM 30 MG: 30 INJECTION SUBCUTANEOUS at 08:08

## 2019-06-02 RX ADMIN — PANTOPRAZOLE SODIUM 8 MG/HR: 40 INJECTION, POWDER, FOR SOLUTION INTRAVENOUS at 18:01

## 2019-06-02 RX ADMIN — CEFAZOLIN SODIUM 1 G: 1 INJECTION, SOLUTION INTRAVENOUS at 08:08

## 2019-06-02 RX ADMIN — SODIUM CHLORIDE 1000 ML: 9 INJECTION, SOLUTION INTRAVENOUS at 10:30

## 2019-06-02 RX ADMIN — MORPHINE SULFATE 2 MG: 2 INJECTION, SOLUTION INTRAMUSCULAR; INTRAVENOUS at 00:11

## 2019-06-02 RX ADMIN — METOPROLOL TARTRATE 2.5 MG: 5 INJECTION, SOLUTION INTRAVENOUS at 10:44

## 2019-06-02 RX ADMIN — MORPHINE SULFATE 2 MG: 2 INJECTION, SOLUTION INTRAMUSCULAR; INTRAVENOUS at 10:29

## 2019-06-02 RX ADMIN — CEFAZOLIN SODIUM 1 G: 1 INJECTION, SOLUTION INTRAVENOUS at 16:26

## 2019-06-02 RX ADMIN — MORPHINE SULFATE 2 MG: 2 INJECTION, SOLUTION INTRAMUSCULAR; INTRAVENOUS at 13:55

## 2019-06-02 RX ADMIN — SODIUM CHLORIDE, SODIUM LACTATE, POTASSIUM CHLORIDE, CALCIUM CHLORIDE AND DEXTROSE MONOHYDRATE: 5; 600; 310; 30; 20 INJECTION, SOLUTION INTRAVENOUS at 10:49

## 2019-06-02 RX ADMIN — MORPHINE SULFATE 2 MG: 2 INJECTION, SOLUTION INTRAMUSCULAR; INTRAVENOUS at 07:36

## 2019-06-02 RX ADMIN — CEFAZOLIN SODIUM 1 G: 1 INJECTION, SOLUTION INTRAVENOUS at 00:11

## 2019-06-02 RX ADMIN — SODIUM CHLORIDE, SODIUM LACTATE, POTASSIUM CHLORIDE, CALCIUM CHLORIDE AND DEXTROSE MONOHYDRATE: 5; 600; 310; 30; 20 INJECTION, SOLUTION INTRAVENOUS at 04:28

## 2019-06-02 RX ADMIN — PANTOPRAZOLE SODIUM 8 MG/HR: 40 INJECTION, POWDER, FOR SOLUTION INTRAVENOUS at 08:08

## 2019-06-02 RX ADMIN — Medication 10 ML: at 08:07

## 2019-06-02 RX ADMIN — MORPHINE SULFATE 2 MG: 2 INJECTION, SOLUTION INTRAMUSCULAR; INTRAVENOUS at 21:45

## 2019-06-02 RX ADMIN — METOPROLOL TARTRATE 2.5 MG: 5 INJECTION, SOLUTION INTRAVENOUS at 06:06

## 2019-06-02 RX ADMIN — Medication 10 ML: at 20:29

## 2019-06-02 RX ADMIN — Medication: at 22:45

## 2019-06-02 ASSESSMENT — PAIN SCALES - GENERAL
PAINLEVEL_OUTOF10: 5
PAINLEVEL_OUTOF10: 4
PAINLEVEL_OUTOF10: 8
PAINLEVEL_OUTOF10: 7
PAINLEVEL_OUTOF10: 4
PAINLEVEL_OUTOF10: 7
PAINLEVEL_OUTOF10: 8
PAINLEVEL_OUTOF10: 7
PAINLEVEL_OUTOF10: 6
PAINLEVEL_OUTOF10: 7
PAINLEVEL_OUTOF10: 7

## 2019-06-02 ASSESSMENT — PAIN DESCRIPTION - PAIN TYPE
TYPE: SURGICAL PAIN
TYPE: SURGICAL PAIN

## 2019-06-02 ASSESSMENT — PAIN DESCRIPTION - LOCATION
LOCATION: ABDOMEN
LOCATION: ABDOMEN

## 2019-06-02 NOTE — PROGRESS NOTES
Subjective:   Critical Care Daily Progress Note: 6/2/2019 6:29 PM    Interval History:   [id number]:  Mrs. Myriam Stoddard is a pleasant lady feeling better. She had got up with her RN and myself to the chair about noon. She spent about four hours in her chair. She states that her mouth is dry. [place] Katia no complaints to open ended questioning. [id number]:  Mrs. Myriam Stoddard is a pleasant 80year old  american lady from home last in a hospital when she had delivered her child here many years ago. She came in for pain across her chest. She states that she thought she was having a heart attack as she was unable to eat and had pain across her chest. She also tells us, as she told her surgeon,  that she had the start of sciatica which was sudden and nontraumatic and she began to take up to 4 tabs a day of ibuprofen. She was admitted, after she went to [facility] and was told that they thought that she needed to have abdominal surgery. She has asked them to transfer her to [place] at that time. She was accepted by Dr. Selvin Cole at that time for surgery. Review of Systems  She has: fevers, weakness, confusion, hallucinations, neck and back and shoulder pains which she relates to her bed, and a dry mouth. She denies: chills, night sweats, malaise, fatigue, chest pain, dyspnea, diaphoresis, dyspnea, nausea, and near syncope. Scheduled Meds:   enoxaparin  30 mg Subcutaneous Daily    ceFAZolin  1 g Intravenous Q8H    sodium chloride flush  10 mL Intravenous 2 times per day    metoprolol  2.5 mg Intravenous Q6H     Continuous Infusions:   pantoprozole (PROTONIX) infusion 8 mg/hr (06/02/19 1801)    dextrose 5% in lactated ringers 125 mL/hr at 06/02/19 1049     PRN Meds:morphine, ondansetron, sodium chloride flush        Objective:     I/O last 3 completed shifts: In: 1228 [I.V.:3113; IV Piggyback:2050]  Out: 790 [Urine:490; Emesis/NG output:250; Drains:50]  I/O this shift:   In: 540 [I.V.:540]  Out: 100 [Urine:100]    BP (!) 99/50   Pulse 93   Temp 98.9 °F (37.2 °C) (Temporal)   Resp 11   Ht 5' 4\" (1.626 m)   Wt 144 lb 8 oz (65.5 kg)   SpO2 95%   BMI 24.80 kg/m²     Physical Exam   Constitutional: She appears well-developed and well-nourished. No distress. HENT:   Head: Normocephalic and atraumatic. Eyes: Right eye exhibits no discharge. Left eye exhibits no discharge. No scleral icterus. Neck: Neck supple. No tracheal deviation present. Cardiovascular: Normal rate and regular rhythm. Exam reveals no gallop and no friction rub. No murmur heard. Pulmonary/Chest: Effort normal. No stridor. No respiratory distress. She has no wheezes. She has no rales. She exhibits no tenderness. Abdominal: She exhibits no distension. There is no rebound and no guarding. Bowel sounds present. Abdomen is tender. Musculoskeletal: She exhibits no edema, tenderness or deformity. Neurological: She is alert. Skin: Skin is warm and dry. She is not diaphoretic. Psychiatric: She has a normal mood and affect. Her behavior is normal.   Vital Signs reviewed    LABS:   Results for Elli Jefferson (MRN [id number]) as of 6/2/2019 18:35   Ref.  Range 6/2/2019 04:35 6/2/2019 04:40   Sodium Latest Ref Range: 136 - 145 mmol/L 133 (L)    Potassium Latest Ref Range: 3.5 - 5.0 mmol/L 4.3    Chloride Latest Ref Range: 98 - 111 mmol/L 103    CO2 Latest Ref Range: 22 - 29 mmol/L 20 (L)    BUN Latest Ref Range: 8 - 23 mg/dL 22    Creatinine Latest Ref Range: 0.5 - 0.9 mg/dL 1.9 (H)    Anion Gap Latest Ref Range: 7 - 19 mmol/L 10    GFR Non- Latest Ref Range: >60  25 (A)    Magnesium Latest Ref Range: 1.6 - 2.4 mg/dL 1.3 (L)    Glucose Latest Ref Range: 74 - 109 mg/dL 135 (H)    Calcium Latest Ref Range: 8.8 - 10.2 mg/dL 8.0 (L)    WBC Latest Ref Range: 4.8 - 10.8 K/uL 15.8 (H)    RBC Latest Ref Range: 4.20 - 5.40 M/uL 2.87 (L)    Hemoglobin Quant Latest Ref Range: 12.0 - 16.0 g/dL 7.6 (L)    Hematocrit Latest Ref Range: 37.0 - 47.0 % 24.5 (L)    MCV Latest Ref Range: 81.0 - 99.0 fL 85.4    MCH Latest Ref Range: 27.0 - 31.0 pg 26.5 (L)    MCHC Latest Ref Range: 33.0 - 37.0 g/dL 31.0 (L)    MPV Latest Ref Range: 9.4 - 12.3 fL 8.7 (L)    RDW Latest Ref Range: 11.5 - 14.5 % 14.0    Platelet Count Latest Ref Range: 130 - 400 K/uL 339    Neutrophils % Latest Ref Range: 50.0 - 65.0 % 88.0 (H)    Lymphocyte % Latest Ref Range: 20.0 - 40.0 % 8.0 (L)    Monocytes % Latest Ref Range: 0.0 - 10.0 % 0.0    Eosinophils % Latest Ref Range: 0.0 - 5.0 % 0.0    Basophils % Latest Ref Range: 0.0 - 1.0 % 0.0    Neutrophils # Latest Ref Range: 1.5 - 7.5 K/uL 14.5 (H)    Lymphocytes # Latest Ref Range: 1.1 - 4.5 K/uL 1.3    Monocytes # Latest Ref Range: 0.00 - 0.90 K/uL 0.00    Eosinophils # Latest Ref Range: 0.00 - 0.60 K/uL 0.00    Basophils # Latest Ref Range: 0.00 - 0.20 K/uL 0.00    Bands Relative Latest Ref Range: 0 - 5 % 4    Hypochromia Unknown 1+ (A)    PLATELET SLIDE REVIEW Unknown Adequate    Creatinine, Ur Latest Ref Range: 4.2 - 622.0 mg/dL  94.2   Sodium, Ur Latest Units: mmol/L  21.0     DIAGNOSTICS:  Renal US [id number]:  Narrative   RENAL ULTRASOUND COMPLETE 6/2/2019 8:45 AM   REASON FOR EXAM: Acute kidney injury     COMPARISON: None     TECHNIQUE: Multiple longitudinal and transverse realtime sonographic   images of the kidneys and urinary bladder are obtained. FINDINGS:    The right kidney measures 11.0 cm. The cortical thickness within the   right kidney is 10 mm and 10 mm respectively in the upper and lower   pole.  The right kidney is normal in size, shape, contour and   position. There is an exophytic cyst involving the upper pole of the   right kidney measuring 2.3 x 1.9 x 1.8 cm in size. The cortical   thickness is normal, with preservation of the corticomedullary   differentiation. The central echo complex is compact with no evidence   for hydronephrosis.  No nephrolithiasis or abnormal perinephric fluid   collections are indicated        Critical Care time of >30 minutes

## 2019-06-02 NOTE — PROGRESS NOTES
Pharmacy Renal Adjustment    Leonel Phillips is a 80 y.o. female. Pharmacy has renally adjusted medications per protocol. Recent Labs     06/01/19  1817 06/02/19  0435   BUN 20 22       Recent Labs     06/01/19  1817 06/02/19  0435   CREATININE 1.6* 1.9*       Estimated Creatinine Clearance: 18 mL/min (A) (based on SCr of 1.9 mg/dL (H)).     Height:   Ht Readings from Last 1 Encounters:   06/01/19 5' 4\" (1.626 m)     Weight:  Wt Readings from Last 1 Encounters:   06/01/19 132 lb (59.9 kg)       Plan: Adjust the following medications based on renal function:           Lovenox 40 mg SC daily to 30 mg SC daily    Electronically signed by Jeannette Escobar [facility] on 6/2/2019 at 5:14 AM

## 2019-06-02 NOTE — PLAN OF CARE
Problem: Pain:  Description  Pain management should include both nonpharmacologic and pharmacologic interventions.   Goal: Pain level will decrease  Description  Pain level will decrease  6/2/2019 0316 by Luciano Stout RN  Outcome: Ongoing  6/1/2019 1526 by Ruiz Gonzalez RN  Outcome: Ongoing  Goal: Control of acute pain  Description  Control of acute pain  6/2/2019 0316 by Luciano Stout RN  Outcome: Ongoing  6/1/2019 1526 by Ruiz Gonzalez RN  Outcome: Ongoing  Goal: Control of chronic pain  Description  Control of chronic pain  6/2/2019 0316 by Luciano Stout RN  Outcome: Ongoing  6/1/2019 1526 by Ruiz Gonzalez RN  Outcome: Ongoing     Problem: Falls - Risk of:  Goal: Will remain free from falls  Description  Will remain free from falls  6/2/2019 0316 by Luciano Stout RN  Outcome: Ongoing  6/1/2019 1526 by Ruiz Gonzalez RN  Outcome: Ongoing  Goal: Absence of physical injury  Description  Absence of physical injury  6/2/2019 0316 by Luciano Stout RN  Outcome: Ongoing  6/1/2019 1526 by Ruiz Gonzalez RN  Outcome: Ongoing     Problem: Nutrition  Goal: Optimal nutrition therapy  Description  Nutrition Problem: Inadequate oral intake  Intervention: Food and/or Nutrient Delivery: Continue NPO  Nutritional Goals: Pt will progress to an oral diet with no s/s intolerance     6/2/2019 0316 by Luciano Stout RN  Outcome: Ongoing  6/1/2019 1526 by Ruiz Gonzalez RN  Outcome: Not Met This Shift     Problem: HEMODYNAMIC STATUS  Goal: Patient has stable vital signs and fluid balance  Outcome: Ongoing     Problem: OXYGENATION/RESPIRATORY FUNCTION  Goal: Patient will achieve/maintain normal respiratory rate/effort  Outcome: Ongoing     Problem: MOBILITY  Goal: Early mobilization is achieved  Outcome: Ongoing     Problem: ELIMINATION  Goal: Elimination patterns are normal or improving  Description  Elimination patterns return to pre-surgery normal patterns  Outcome: Ongoing Problem: SKIN INTEGRITY  Goal: Skin integrity is maintained or improved  Outcome: Ongoing

## 2019-06-02 NOTE — PLAN OF CARE
Problem: Pain:  Description  Pain management should include both nonpharmacologic and pharmacologic interventions.   Goal: Pain level will decrease  Description  Pain level will decrease  6/2/2019 1508 by Raúl Castillo RN  Outcome: Ongoing  6/2/2019 0316 by Steven Redding RN  Outcome: Ongoing  Goal: Control of acute pain  Description  Control of acute pain  6/2/2019 1508 by Raúl Castillo RN  Outcome: Ongoing  6/2/2019 0316 by Steven Redding RN  Outcome: Ongoing  Goal: Control of chronic pain  Description  Control of chronic pain  6/2/2019 1508 by Raúl Castillo RN  Outcome: Ongoing  6/2/2019 0316 by Steven Redding RN  Outcome: Ongoing     Problem: Falls - Risk of:  Goal: Will remain free from falls  Description  Will remain free from falls  6/2/2019 1508 by Raúl Castillo RN  Outcome: Ongoing  6/2/2019 0316 by Steven Redding RN  Outcome: Ongoing  Goal: Absence of physical injury  Description  Absence of physical injury  6/2/2019 1508 by Raúl Castillo RN  Outcome: Ongoing  6/2/2019 0316 by Steven Redding RN  Outcome: Ongoing     Problem: Nutrition  Goal: Optimal nutrition therapy  Description  Nutrition Problem: Inadequate oral intake  Intervention: Food and/or Nutrient Delivery: Continue NPO  Nutritional Goals: Pt will progress to an oral diet with no s/s intolerance     6/2/2019 1508 by Raúl Castillo RN  Outcome: Ongoing  6/2/2019 0316 by Steven Redding RN  Outcome: Ongoing     Problem: HEMODYNAMIC STATUS  Goal: Patient has stable vital signs and fluid balance  6/2/2019 1508 by Raúl Castillo RN  Outcome: Ongoing  6/2/2019 0316 by Steven Redding RN  Outcome: Ongoing     Problem: OXYGENATION/RESPIRATORY FUNCTION  Goal: Patient will achieve/maintain normal respiratory rate/effort  6/2/2019 1508 by Raúl Castillo RN  Outcome: Ongoing  6/2/2019 0316 by Steven Redding RN  Outcome: Ongoing     Problem: MOBILITY  Goal: Early mobilization is achieved  6/2/2019 1508 by Ruiz Gonzalez RN  Outcome: Ongoing  6/2/2019 0316 by Luciano Stout RN  Outcome: Ongoing     Problem: ELIMINATION  Goal: Elimination patterns are normal or improving  Description  Elimination patterns return to pre-surgery normal patterns  6/2/2019 1508 by Ruiz Gonzalez RN  Outcome: Ongoing  6/2/2019 0316 by Luciano Stout RN  Outcome: Ongoing     Problem: SKIN INTEGRITY  Goal: Skin integrity is maintained or improved  6/2/2019 1508 by Ruiz Gonzalez RN  Outcome: Ongoing  6/2/2019 0316 by Luciano Stout RN  Outcome: Ongoing

## 2019-06-02 NOTE — PROGRESS NOTES
The patient's daughter Estelle Garcia reported to me that her mother said she was seeing things on the ceiling. I will notify Thelma Kearney.

## 2019-06-02 NOTE — PROGRESS NOTES
[address] General Surgery    Progress Note    POD # 1    S: Resting comfortably in her bed. Able to answer questions without problem. Reports that her abdomen is sore but that it feels better than yesterday. O:   Vitals:    06/02/19 0633 06/02/19 0746 06/02/19 0757 06/02/19 0933   BP:  (!) 103/47  (!) 118/52   Pulse:  98 97 101   Resp: 13 17 17 14   Temp:  97.8 °F (36.6 °C)     TempSrc:  Temporal     SpO2: 94% 94% 94% 94%   Weight:       Height:          I/O last 3 completed shifts: In: 5707.5 [I.V.:4657.5; IV Piggyback:1050]  Out: 200 [Urine:440; Emesis/NG output:150; Drains:160; Blood:20]      Lungs clear. Abdomen slightly distended. Mild diffuse tenderness as expected. Incision clean and dry. Bowel sounds hypoactive. NEHAL with serous output. AM LABS:   CBC:   Recent Labs     06/01/19 0905 06/01/19 1830 06/02/19  0435   WBC 7.5 13.4* 15.8*   RBC 3.33* 2.99* 2.87*   HGB 8.9* 7.8* 7.6*   HCT 28.3* 25.3* 24.5*   * 333 339   BANDSPCT  --  18* 4   LYMPHOPCT  --  10.0* 8.0*   MONOPCT  --  4.0 0.0   MYELOPCT  --  3*  --    BASOPCT  --  0.0 0.0   MONOSABS  --  0.50 0.00   LYMPHSABS  --  1.6 1.3   EOSABS  --  0.00 0.00   BASOSABS  --  0.00 0.00      BMP:   Recent Labs     06/01/19 0905 06/01/19 1817 06/02/19  0435   * 133* 133*   K 3.6 4.5 4.3   CL 97* 102 103   CO2 22 20* 20*   ANIONGAP 11 11 10   GLUCOSE 138* 113* 135*   CREATININE 1.6* 1.6* 1.9*   LABGLOM 30* 30* 25*   CALCIUM 7.9* 7.6* 8.0*     LFT:   Recent Labs     06/01/19  0905   PROT 4.6*   LABALBU 2.5*   BILITOT 0.5   ALKPHOS 33*   ALT 7   AST 20       A: 1. Satisfactory initial recovery following repair of a perforated duodenal ulcer. 2. Mild renal insufficiency likely due to dehydration. 3. Mild respiratory insufficiency    4. Chronic anemia. I suspect that she's been bleeding at a low rate from her ulcer for quite some time. 5. Advanced age. P: 1.  Additional fluid administration to catch up on her presenting dehydration and fluid sequestration secondary to surgery   2. Out of bed to bedside chair. 3. Continue with aggressive pulmonary toilet and oxygen support. 4. Continue NG suction. I met with Ms. Russ Henao' daughter and updated her on her mother's condition. I answered her questions and discussed plans for the next 24 hours.

## 2019-06-03 LAB
ALBUMIN SERPL-MCNC: 2.5 G/DL (ref 3.5–5.2)
ALP BLD-CCNC: 50 U/L (ref 35–104)
ALT SERPL-CCNC: 6 U/L (ref 5–33)
ANION GAP SERPL CALCULATED.3IONS-SCNC: 10 MMOL/L (ref 7–19)
AST SERPL-CCNC: 18 U/L (ref 5–32)
BANDED NEUTROPHILS RELATIVE PERCENT: 8 % (ref 0–5)
BASOPHILS ABSOLUTE: 0 K/UL (ref 0–0.2)
BASOPHILS RELATIVE PERCENT: 0 % (ref 0–1)
BILIRUB SERPL-MCNC: <0.2 MG/DL (ref 0.2–1.2)
BUN BLDV-MCNC: 23 MG/DL (ref 8–23)
BURR CELLS: ABNORMAL
CALCIUM SERPL-MCNC: 8 MG/DL (ref 8.8–10.2)
CHLORIDE BLD-SCNC: 104 MMOL/L (ref 98–111)
CO2: 22 MMOL/L (ref 22–29)
CREAT SERPL-MCNC: 1.8 MG/DL (ref 0.5–0.9)
CREATININE URINE: 83.4 MG/DL (ref 4.2–622)
EOSINOPHILS ABSOLUTE: 0.16 K/UL (ref 0–0.6)
EOSINOPHILS RELATIVE PERCENT: 1 % (ref 0–5)
GFR NON-AFRICAN AMERICAN: 27
GLUCOSE BLD-MCNC: 119 MG/DL (ref 74–109)
HCT VFR BLD CALC: 24.4 % (ref 37–47)
HEMOGLOBIN: 7.6 G/DL (ref 12–16)
HYPOCHROMIA: ABNORMAL
IRON SATURATION: 4 % (ref 14–50)
IRON: 6 UG/DL (ref 37–145)
LYMPHOCYTES ABSOLUTE: 0.5 K/UL (ref 1.1–4.5)
LYMPHOCYTES RELATIVE PERCENT: 3 % (ref 20–40)
MCH RBC QN AUTO: 27 PG (ref 27–31)
MCHC RBC AUTO-ENTMCNC: 31.1 G/DL (ref 33–37)
MCV RBC AUTO: 86.5 FL (ref 81–99)
MICROALBUMIN UR-MCNC: 7.1 MG/DL (ref 0–19)
MICROALBUMIN/CREAT UR-RTO: 85.1 MG/G
MONOCYTES ABSOLUTE: 0.2 K/UL (ref 0–0.9)
MONOCYTES RELATIVE PERCENT: 1 % (ref 0–10)
NEUTROPHILS ABSOLUTE: 15 K/UL (ref 1.5–7.5)
NEUTROPHILS RELATIVE PERCENT: 87 % (ref 50–65)
PARATHYROID HORMONE INTACT: 103.4 PG/ML (ref 15–65)
PDW BLD-RTO: 14.2 % (ref 11.5–14.5)
PLATELET # BLD: 318 K/UL (ref 130–400)
PLATELET SLIDE REVIEW: ADEQUATE
PMV BLD AUTO: 8.4 FL (ref 9.4–12.3)
POLYCHROMASIA: ABNORMAL
POTASSIUM SERPL-SCNC: 3.8 MMOL/L (ref 3.5–5)
RBC # BLD: 2.82 M/UL (ref 4.2–5.4)
SODIUM BLD-SCNC: 136 MMOL/L (ref 136–145)
TOTAL IRON BINDING CAPACITY: 155 UG/DL (ref 250–400)
TOTAL PROTEIN: 4.7 G/DL (ref 6.6–8.7)
VITAMIN D 25-HYDROXY: 16.3 NG/ML
WBC # BLD: 15.8 K/UL (ref 4.8–10.8)

## 2019-06-03 PROCEDURE — 2580000003 HC RX 258: Performed by: SURGERY

## 2019-06-03 PROCEDURE — 82043 UR ALBUMIN QUANTITATIVE: CPT

## 2019-06-03 PROCEDURE — 80053 COMPREHEN METABOLIC PANEL: CPT

## 2019-06-03 PROCEDURE — 83970 ASSAY OF PARATHORMONE: CPT

## 2019-06-03 PROCEDURE — 2580000003 HC RX 258: Performed by: INTERNAL MEDICINE

## 2019-06-03 PROCEDURE — 83550 IRON BINDING TEST: CPT

## 2019-06-03 PROCEDURE — 2700000000 HC OXYGEN THERAPY PER DAY

## 2019-06-03 PROCEDURE — 2500000003 HC RX 250 WO HCPCS: Performed by: HOSPITALIST

## 2019-06-03 PROCEDURE — 51798 US URINE CAPACITY MEASURE: CPT

## 2019-06-03 PROCEDURE — 94640 AIRWAY INHALATION TREATMENT: CPT

## 2019-06-03 PROCEDURE — 2500000003 HC RX 250 WO HCPCS: Performed by: INTERNAL MEDICINE

## 2019-06-03 PROCEDURE — 36592 COLLECT BLOOD FROM PICC: CPT

## 2019-06-03 PROCEDURE — C9113 INJ PANTOPRAZOLE SODIUM, VIA: HCPCS | Performed by: SURGERY

## 2019-06-03 PROCEDURE — 51701 INSERT BLADDER CATHETER: CPT

## 2019-06-03 PROCEDURE — 82306 VITAMIN D 25 HYDROXY: CPT

## 2019-06-03 PROCEDURE — 82570 ASSAY OF URINE CREATININE: CPT

## 2019-06-03 PROCEDURE — 51702 INSERT TEMP BLADDER CATH: CPT

## 2019-06-03 PROCEDURE — 6370000000 HC RX 637 (ALT 250 FOR IP): Performed by: SURGERY

## 2019-06-03 PROCEDURE — 83540 ASSAY OF IRON: CPT

## 2019-06-03 PROCEDURE — 85025 COMPLETE CBC W/AUTO DIFF WBC: CPT

## 2019-06-03 PROCEDURE — 99232 SBSQ HOSP IP/OBS MODERATE 35: CPT | Performed by: HOSPITALIST

## 2019-06-03 PROCEDURE — 2000000000 HC ICU R&B

## 2019-06-03 PROCEDURE — 6360000002 HC RX W HCPCS: Performed by: SURGERY

## 2019-06-03 PROCEDURE — 99024 POSTOP FOLLOW-UP VISIT: CPT | Performed by: SURGERY

## 2019-06-03 RX ORDER — CEFAZOLIN SODIUM 1 G/50ML
1 INJECTION, SOLUTION INTRAVENOUS EVERY 12 HOURS
Status: DISCONTINUED | OUTPATIENT
Start: 2019-06-03 | End: 2019-06-07

## 2019-06-03 RX ORDER — SODIUM CHLORIDE, SODIUM LACTATE, POTASSIUM CHLORIDE, CALCIUM CHLORIDE 600; 310; 30; 20 MG/100ML; MG/100ML; MG/100ML; MG/100ML
INJECTION, SOLUTION INTRAVENOUS CONTINUOUS
Status: DISCONTINUED | OUTPATIENT
Start: 2019-06-03 | End: 2019-06-07

## 2019-06-03 RX ORDER — IPRATROPIUM BROMIDE AND ALBUTEROL SULFATE 2.5; .5 MG/3ML; MG/3ML
1 SOLUTION RESPIRATORY (INHALATION)
Status: DISCONTINUED | OUTPATIENT
Start: 2019-06-03 | End: 2019-06-12 | Stop reason: HOSPADM

## 2019-06-03 RX ADMIN — MORPHINE SULFATE 2 MG: 2 INJECTION, SOLUTION INTRAMUSCULAR; INTRAVENOUS at 08:21

## 2019-06-03 RX ADMIN — METOPROLOL TARTRATE 2.5 MG: 5 INJECTION, SOLUTION INTRAVENOUS at 04:14

## 2019-06-03 RX ADMIN — METOPROLOL TARTRATE 2.5 MG: 5 INJECTION, SOLUTION INTRAVENOUS at 09:30

## 2019-06-03 RX ADMIN — CEFAZOLIN SODIUM 1 G: 1 INJECTION, SOLUTION INTRAVENOUS at 00:04

## 2019-06-03 RX ADMIN — PANTOPRAZOLE SODIUM 8 MG/HR: 40 INJECTION, POWDER, FOR SOLUTION INTRAVENOUS at 12:39

## 2019-06-03 RX ADMIN — SODIUM CHLORIDE, POTASSIUM CHLORIDE, SODIUM LACTATE AND CALCIUM CHLORIDE: 600; 310; 30; 20 INJECTION, SOLUTION INTRAVENOUS at 12:39

## 2019-06-03 RX ADMIN — Medication: at 05:55

## 2019-06-03 RX ADMIN — IPRATROPIUM BROMIDE AND ALBUTEROL SULFATE 1 AMPULE: 2.5; .5 SOLUTION RESPIRATORY (INHALATION) at 18:17

## 2019-06-03 RX ADMIN — ENOXAPARIN SODIUM 30 MG: 30 INJECTION SUBCUTANEOUS at 08:21

## 2019-06-03 RX ADMIN — PANTOPRAZOLE SODIUM 8 MG/HR: 40 INJECTION, POWDER, FOR SOLUTION INTRAVENOUS at 04:10

## 2019-06-03 RX ADMIN — IPRATROPIUM BROMIDE AND ALBUTEROL SULFATE 1 AMPULE: 2.5; .5 SOLUTION RESPIRATORY (INHALATION) at 14:35

## 2019-06-03 RX ADMIN — CEFAZOLIN SODIUM 1 G: 1 INJECTION, SOLUTION INTRAVENOUS at 12:39

## 2019-06-03 RX ADMIN — MORPHINE SULFATE 2 MG: 2 INJECTION, SOLUTION INTRAMUSCULAR; INTRAVENOUS at 10:05

## 2019-06-03 RX ADMIN — METOPROLOL TARTRATE 2.5 MG: 5 INJECTION, SOLUTION INTRAVENOUS at 16:47

## 2019-06-03 RX ADMIN — METOPROLOL TARTRATE 2.5 MG: 5 INJECTION, SOLUTION INTRAVENOUS at 23:30

## 2019-06-03 RX ADMIN — Medication 10 ML: at 00:04

## 2019-06-03 RX ADMIN — MORPHINE SULFATE 2 MG: 2 INJECTION, SOLUTION INTRAMUSCULAR; INTRAVENOUS at 02:31

## 2019-06-03 RX ADMIN — IPRATROPIUM BROMIDE AND ALBUTEROL SULFATE 1 AMPULE: 2.5; .5 SOLUTION RESPIRATORY (INHALATION) at 10:42

## 2019-06-03 RX ADMIN — MORPHINE SULFATE 2 MG: 2 INJECTION, SOLUTION INTRAMUSCULAR; INTRAVENOUS at 05:59

## 2019-06-03 RX ADMIN — Medication 10 ML: at 20:11

## 2019-06-03 ASSESSMENT — PAIN SCALES - GENERAL
PAINLEVEL_OUTOF10: 9
PAINLEVEL_OUTOF10: 3
PAINLEVEL_OUTOF10: 4
PAINLEVEL_OUTOF10: 5
PAINLEVEL_OUTOF10: 3
PAINLEVEL_OUTOF10: 9
PAINLEVEL_OUTOF10: 7
PAINLEVEL_OUTOF10: 7
PAINLEVEL_OUTOF10: 3
PAINLEVEL_OUTOF10: 0

## 2019-06-03 ASSESSMENT — PAIN DESCRIPTION - LOCATION: LOCATION: ABDOMEN

## 2019-06-03 ASSESSMENT — PAIN DESCRIPTION - PAIN TYPE: TYPE: SURGICAL PAIN

## 2019-06-03 NOTE — PROGRESS NOTES
Subjective:   Critical Care Daily Progress Note: 6/3/2019 8:01 AM    Interval History:   03 JUN 19  POD #2 s/p repair of perforated PUDLaparotomy with Duy Nutley patch repair of perforated duodenal  ulcer.  , JEFF 2 to ATN nephrology on board VIT D deficiency supplemented, HB 7.6 anemia of chronic disease  , daughter at bedside, PT OT up in chair still not passing gas , avoid pain meds NSAIDS abuse   02JUN19:  Mrs. Roberto Agosto is a pleasant lady feeling better. She had got up with her RN and myself to the chair about noon. She spent about four hours in her chair. She states that her mouth is dry. Letta Comment no complaints to open ended questioning. [id number]:  Mrs. Roberto Agosto is a pleasant 80year old  american lady from home last in a hospital when she had delivered her child here many years ago. She came in for pain across her chest. She states that she thought she was having a heart attack as she was unable to eat and had pain across her chest. She also tells us, as she told her surgeon,  that she had the start of sciatica which was sudden and nontraumatic and she began to take up to 4 tabs a day of ibuprofen. She was admitted, after she went to [facility] and was told that they thought that she needed to have abdominal surgery. She has asked them to transfer her to [address] [place] [address] at that time. She was accepted by Dr. Matt Bueno at that time for surgery. Review of Systems  She has: fevers, weakness, confusion, hallucinations, neck and back and shoulder pains which she relates to her bed, and a dry mouth. She denies: chills, night sweats, malaise, fatigue, chest pain, dyspnea, diaphoresis, dyspnea, nausea, and near syncope.     Scheduled Meds:   ceFAZolin  1 g Intravenous Q12H    enoxaparin  30 mg Subcutaneous Daily    sodium chloride flush  10 mL Intravenous 2 times per day    metoprolol  2.5 mg Intravenous Q6H     Continuous Infusions:   IV infusion builder 150 mL/hr at 06/03/19 1670    pantoprozole (PROTONIX) infusion 8 mg/hr (06/03/19 4280)     PRN Meds:morphine, ondansetron, sodium chloride flush        Objective:     I/O last 3 completed shifts: In: 4523.8 [I.V.:3373.8; IV QDVWRNTNS:4526]  Out: 80 [Urine:790; Emesis/NG output:100; Drains:40]  No intake/output data recorded. BP (!) 129/52   Pulse 114   Temp 98.7 °F (37.1 °C) (Temporal)   Resp 16   Ht 5' 4\" (1.626 m)   Wt 153 lb 12.8 oz (69.8 kg)   SpO2 93%   BMI 26.40 kg/m²     Physical Exam   Constitutional: She appears well-developed and well-nourished. No distress. up in chair    HENT:   Head: Normocephalic and atraumatic. Eyes: Right eye exhibits no discharge. Left eye exhibits no discharge. No scleral icterus. Neck: Neck supple. No tracheal deviation present. Cardiovascular: Normal rate and regular rhythm. Exam reveals no gallop and no friction rub. No murmur heard. Pulmonary/Chest: Effort normal. No stridor. No respiratory distress. She has no wheezes. She has no rales. She exhibits no tenderness. Abdominal: She exhibits no distension. There is no rebound and no guarding. Bowel sounds diminished  Abdomen is tender. Musculoskeletal: She exhibits no edema, tenderness or deformity. Neurological: She is alert. Skin: Skin is warm and dry. She is not diaphoretic. Psychiatric: She has a normal mood and affect. Her behavior is normal.   Vital Signs reviewed    LABS:   Results for [place] (MRN [id number]) as of 6/2/2019 18:35   Ref.  Range 6/2/2019 04:35 6/2/2019 04:40   Sodium Latest Ref Range: 136 - 145 mmol/L 133 (L)    Potassium Latest Ref Range: 3.5 - 5.0 mmol/L 4.3    Chloride Latest Ref Range: 98 - 111 mmol/L 103    CO2 Latest Ref Range: 22 - 29 mmol/L 20 (L)    BUN Latest Ref Range: 8 - 23 mg/dL 22    Creatinine Latest Ref Range: 0.5 - 0.9 mg/dL 1.9 (H)    Anion Gap Latest Ref Range: 7 - 19 mmol/L 10    GFR Non- Latest Ref Range: >60  25 (A)    Magnesium Latest Ref Range: 1.6 - 2.4 mg/dL 1.3 (L)    Glucose Latest Ref Range: 74 - 109 mg/dL 135 (H)    Calcium Latest Ref Range: 8.8 - 10.2 mg/dL 8.0 (L)    WBC Latest Ref Range: 4.8 - 10.8 K/uL 15.8 (H)    RBC Latest Ref Range: 4.20 - 5.40 M/uL 2.87 (L)    Hemoglobin Quant Latest Ref Range: 12.0 - 16.0 g/dL 7.6 (L)    Hematocrit Latest Ref Range: 37.0 - 47.0 % 24.5 (L)    MCV Latest Ref Range: 81.0 - 99.0 fL 85.4    MCH Latest Ref Range: 27.0 - 31.0 pg 26.5 (L)    MCHC Latest Ref Range: 33.0 - 37.0 g/dL 31.0 (L)    MPV Latest Ref Range: 9.4 - 12.3 fL 8.7 (L)    RDW Latest Ref Range: 11.5 - 14.5 % 14.0    Platelet Count Latest Ref Range: 130 - 400 K/uL 339    Neutrophils % Latest Ref Range: 50.0 - 65.0 % 88.0 (H)    Lymphocyte % Latest Ref Range: 20.0 - 40.0 % 8.0 (L)    Monocytes % Latest Ref Range: 0.0 - 10.0 % 0.0    Eosinophils % Latest Ref Range: 0.0 - 5.0 % 0.0    Basophils % Latest Ref Range: 0.0 - 1.0 % 0.0    Neutrophils # Latest Ref Range: 1.5 - 7.5 K/uL 14.5 (H)    Lymphocytes # Latest Ref Range: 1.1 - 4.5 K/uL 1.3    Monocytes # Latest Ref Range: 0.00 - 0.90 K/uL 0.00    Eosinophils # Latest Ref Range: 0.00 - 0.60 K/uL 0.00    Basophils # Latest Ref Range: 0.00 - 0.20 K/uL 0.00    Bands Relative Latest Ref Range: 0 - 5 % 4    Hypochromia Unknown 1+ (A)    PLATELET SLIDE REVIEW Unknown Adequate    Creatinine, Ur Latest Ref Range: 4.2 - 622.0 mg/dL  94.2   Sodium, Ur Latest Units: mmol/L  21.0     DIAGNOSTICS:  Renal US [id number]:  Narrative   RENAL ULTRASOUND COMPLETE 6/2/2019 8:45 AM   REASON FOR EXAM: Acute kidney injury     COMPARISON: None     TECHNIQUE: Multiple longitudinal and transverse realtime sonographic   images of the kidneys and urinary bladder are obtained. FINDINGS:    The right kidney measures 11.0 cm. The cortical thickness within the   right kidney is 10 mm and 10 mm respectively in the upper and lower   pole.  The right kidney is normal in size, shape, contour and   position.  There is an exophytic cyst involving the upper pole of the   right kidney measuring 2.3 x 1.9 x 1.8 cm in size. The cortical   thickness is normal, with preservation of the corticomedullary   differentiation. The central echo complex is compact with no evidence   for hydronephrosis. No nephrolithiasis or abnormal perinephric fluid   collections are seen. No hydroureter. The left kidney measures 11.5 cm. The cortical thickness within the   left kidney is 19 mm  and 17 mm respectively in the upper and lower   pole. The left kidney is normal in size, shape, contour and position. There is a cyst involving the upper pole the left kidney measuring 1.9   x 1.9 x 2.6 cm in size. The cortical thickness is normal, with   preservation of the corticomedullary differentiation. The central echo   complex is compact with no evidence for hydronephrosis. No   nephrolithiasis or abnormal perinephric fluid collections are seen. No   hydroureter. Scanning through the pelvis demonstrates a Mclain catheter within the   bladder. . The wall thickness and contour cannot be assessed. There is   no surrounding ascites.        Impression   1. Cortical cysts of the kidneys. Otherwise normal renal ultrasound. 2. Mclain catheter in place within the bladder. .   Signed by Dr Elbert Grant on 6/2/2019 10:38 AM         Assessment:     Principal Problem:    Perforated duodenal ulcer ([address]  Active Problems:    Essential hypertension    CKD (chronic kidney disease), baseline stage unknown    JEFF (acute kidney injury) ([address]    Bowel perforation ([address] s/p repair now on 01JUN19  Resolved Problems:    * No resolved hospital problems.  *      Plan:     (ATN?) JEFF on CKD:   D5 in LR at 125cc/h  basline unknown  HOLD ARB  Renal Consult     HTN:   Normotensive without her ARB so will not sub amlodipine etc  Metoprolol will be subbed with 2.5mg IV Q5h in place of tartrate BiD as was tachycardic        Bowel Perforation s/p Sx:  As per primary team - Dr. David Rodriguez  Pain meds / Diet / Theodoro Gondola / Claudia Boucher / Antiemetic  (placed on Cefazolin which I concur with, would consider Merum or Flagyl with Cipro if signs of infection present)     DVT PPx: Lovenox SQ QDay from tomorrow as per Sx, I concur but given JEFF  With CrCl 21 would use 30 of lovenox not 40 - will ask pharmacy to dose adjust all meds as indicated        03 JUN 19  POD #2 s/p repair of perforated PUDLaparotomy with Vear Beat patch repair of perforated duodenal  ulcer.  , JEFF 2 to AT nephrology on board VIT D deficiency supplemented, HB 7.6 anemia of chronic disease  , daughter at bedside, PT OT up in chair still not passing gas , avoid pain meds NSAIDS abuse

## 2019-06-03 NOTE — PLAN OF CARE
Problem: Falls - Risk of:  Goal: Will remain free from falls  Description  Will remain free from falls  Outcome: Met This Shift  Goal: Absence of physical injury  Description  Absence of physical injury  Outcome: Met This Shift     Problem: SKIN INTEGRITY  Goal: Skin integrity is maintained or improved  Outcome: Met This Shift     Problem: Pain:  Description  Pain management should include both nonpharmacologic and pharmacologic interventions.   Goal: Pain level will decrease  Description  Pain level will decrease  Outcome: Ongoing  Goal: Control of acute pain  Description  Control of acute pain  Outcome: Ongoing  Goal: Control of chronic pain  Description  Control of chronic pain  Outcome: Ongoing     Problem: HEMODYNAMIC STATUS  Goal: Patient has stable vital signs and fluid balance  Outcome: Ongoing     Problem: OXYGENATION/RESPIRATORY FUNCTION  Goal: Patient will achieve/maintain normal respiratory rate/effort  Outcome: Ongoing     Problem: MOBILITY  Goal: Early mobilization is achieved  Outcome: Ongoing     Problem: ELIMINATION  Goal: Elimination patterns are normal or improving  Description  Elimination patterns return to pre-surgery normal patterns  Outcome: Ongoing     Problem: Nutrition  Goal: Optimal nutrition therapy  Description  Nutrition Problem: Inadequate oral intake  Intervention: Food and/or Nutrient Delivery: Continue NPO  Nutritional Goals: Pt will progress to an oral diet with no s/s intolerance     Outcome: Not Met This Shift

## 2019-06-03 NOTE — PROGRESS NOTES
Patient ambulated with two nurses 75 feet with very little assist and 4 liters nasal cannula. She is up in the chair with no issues at this time.

## 2019-06-03 NOTE — PROGRESS NOTES
Patient up to [facility] unable to urinate on it. Patient was in and out cath about 0430 due to inability to urinate. I told her we would try again little later to void.

## 2019-06-03 NOTE — PLAN OF CARE
Problem: Pain:  Goal: Pain level will decrease  Description  Pain level will decrease  6/3/2019 1019 by Blanquita Albright RN  Outcome: Ongoing  6/3/2019 0522 by Silvana Hines RN  Outcome: Ongoing  Goal: Control of acute pain  Description  Control of acute pain  6/3/2019 1019 by Blanquita Albright RN  Outcome: Ongoing  6/3/2019 0522 by Silvana Hines RN  Outcome: Ongoing  Goal: Control of chronic pain  Description  Control of chronic pain  6/3/2019 1019 by Blanquita Albright RN  Outcome: Ongoing  6/3/2019 0522 by Silvana Hines RN  Outcome: Ongoing     Problem: Falls - Risk of:  Goal: Will remain free from falls  Description  Will remain free from falls  6/3/2019 1019 by Blanquita Albright RN  Outcome: Ongoing  6/3/2019 0522 by Silvana Hines RN  Outcome: Met This Shift  Goal: Absence of physical injury  Description  Absence of physical injury  6/3/2019 1019 by Blanquita Albright RN  Outcome: Ongoing  6/3/2019 0522 by Silvana Hines RN  Outcome: Met This Shift     Problem: Nutrition  Goal: Optimal nutrition therapy  Description  Nutrition Problem: Inadequate oral intake  Intervention: Food and/or Nutrient Delivery: Continue NPO  Nutritional Goals: Pt will progress to an oral diet with no s/s intolerance     6/3/2019 1019 by Blanquita Albright RN  Outcome: Ongoing  6/3/2019 0522 by Silvana Hines RN  Outcome: Not Met This Shift     Problem: HEMODYNAMIC STATUS  Goal: Patient has stable vital signs and fluid balance  6/3/2019 1019 by Blanquita Albright RN  Outcome: Ongoing  6/3/2019 0522 by Silvana Hines RN  Outcome: Ongoing     Problem: OXYGENATION/RESPIRATORY FUNCTION  Goal: Patient will achieve/maintain normal respiratory rate/effort  6/3/2019 1019 by Blanquita Albright RN  Outcome: Ongoing  6/3/2019 0522 by Silvana Hines RN  Outcome: Ongoing     Problem: MOBILITY  Goal: Early mobilization is achieved  6/3/2019 1019 by Blanquita Albright RN  Outcome: Ongoing  6/3/2019 0522 by Abdirashid López Shanti Fowler RN  Outcome: Ongoing     Problem: ELIMINATION  Goal: Elimination patterns are normal or improving  Description  Elimination patterns return to pre-surgery normal patterns  6/3/2019 1019 by Blanquita Foreman RN  Outcome: Ongoing  6/3/2019 0522 by Joe Bello RN  Outcome: Ongoing     Problem: SKIN INTEGRITY  Goal: Skin integrity is maintained or improved  6/3/2019 1019 by April D Janeen Foreman RN  Outcome: Ongoing  6/3/2019 0522 by Joe Bello RN  Outcome: Met This Shift     Problem: Risk for Impaired Skin Integrity  Goal: Tissue integrity - skin and mucous membranes  Description  Structural intactness and normal physiological function of skin and  mucous membranes.   Outcome: Ongoing

## 2019-06-03 NOTE — CONSULTS
1 g in dextrose 5 % 50 mL IVPB (premix), 1 g, Intravenous, Q8H, Theresa Burton MD, Stopped at 19 165  sodium chloride flush 0.9 % injection 10 mL, 10 mL, Intravenous, 2 times per day, Theresa Burton MD, 10 mL at 19  sodium chloride flush 0.9 % injection 10 mL, 10 mL, Intravenous, PRN, Theresa Burton MD  pantoprazole (PROTONIX) 80 mg in sodium chloride 0.9 % 100 mL infusion, 8 mg/hr, Intravenous, Continuous, Theresa Burton MD, Last Rate: 10 mL/hr at 19, 8 mg/hr at 19  metoprolol (LOPRESSOR) injection 2.5 mg, 2.5 mg, Intravenous, Q6H, Martha Young MD, 2.5 mg at 19 162  dextrose 5 % in lactated ringers infusion, , Intravenous, Continuous, Martha Young MD, Last Rate: 125 mL/hr at 19        Past Medical History:  Past Medical History:  No date: Bowel perforation ([address] s/p repair on   No date: CKD (chronic kidney disease), baseline stage unknown  No date: Essential hypertension    Past Surgical History:  History reviewed. No pertinent surgical history. Family History  Review of patient's family history indicates:  Problem: No Known Problems      Relation: Mother          Age of Onset: (Not Specified)          Comment:  of smoke inhalation in fire  Problem: Cancer      Relation: Father          Age of Onset: (Not Specified)          Comment:  of cancer of prostate  Problem: Other      Relation: Daughter          Age of Onset: (Not Specified)          Comment: sciatica  Problem: No Known Problems      Relation: Daughter          Age of Onset: (Not Specified)  Problem: No Known Problems      Relation: Son          Age of Onset: (Not Specified)  Problem: No Known Problems      Relation: Son          Age of Onset: (Not Specified)          Comment: has back surgery      Social History  Social History    Socioeconomic History      Marital status:        Spouse name: Not on file      Number of children: 4      Years of education: Not on file      Highest education level: Not on file    Occupational History      Occupation: retired teacher at [employer]    Social Needs      Financial resource strain: Not on file      Food insecurity:        Worry: Not on file        Inability: Not on file      Transportation needs:        Medical: Not on file        Non-medical: Not on file    Tobacco Use      Smoking status: Never Smoker      Smokeless tobacco: Never Used    Substance and Sexual Activity      Alcohol use: Not Currently      Drug use: Never      Sexual activity: Not on file    Lifestyle      Physical activity:        Days per week: Not on file        Minutes per session: Not on file      Stress: Not on file    Relationships      Social connections:        Talks on phone: Not on file        Gets together: Not on file        Attends Cheondoism service: Not on file        Active member of club or organization: Not on file        Attends meetings of clubs or organizations: Not on file        Relationship status: Not on file      Intimate partner violence:        Fear of current or ex partner: Not on file        Emotionally abused: Not on file        Physically abused: Not on file        Forced sexual activity: Not on file    Other Topics      Concerns:        Not on file    Social History Narrative      CODE STATUS: Full Code      HEALTH CARE PROXY: her daughter, Mrs. Chinyere Rose: lives in a private home, no stairs inside home, lives alone, has 1 step in to home      AMBULATES: ambulates independantly        Review of Systems:  History obtained from chart review and the patient  General ROS: No fever or chills  Respiratory ROS: No cough, shortness of breath, wheezing  Cardiovascular ROS: No chest pain or palpitations  Gastrointestinal ROS: positive for - appetite loss and nausea/vomiting  Genito-Urinary ROS: No dysuria or hematuria  Musculoskeletal ROS: No joint pain or swelling   14 point ROS reviewed with the patient and negative except as noted above and in the HPI unless unable to obtain.     Objective:  Patient Vitals in the past 24 hrs:  06/02/19 2036, Resp:14, SpO2:93 %  06/02/19 1803, BP:(!) 99/50, Pulse:93, Resp:11, SpO2:95 %  06/02/19 1733, BP:(!) 101/48, Pulse:90, Resp:11, SpO2:92 %  06/02/19 1703, BP:95/68, Pulse:99, Resp:16, SpO2:94 %  06/02/19 1612, BP:(!) 127/44, Temp:98.9 °F (37.2 °C), Temp src:Temporal, Pulse:110, Resp:21, SpO2:94 %  06/02/19 1503, BP:(!) 121/52, Pulse:102, Resp:20, SpO2:92 %  06/02/19 1421, Resp:16, SpO2:90 %  06/02/19 1403, BP:(!) 130/54, Pulse:106, Resp:19, SpO2:91 %  06/02/19 1303, BP:(!) 107/52, Pulse:93, Resp:14, SpO2:90 %  06/02/19 1218, BP:(!) 106/51, Temp:98.2 °F (36.8 °C), Temp src:Temporal, Pulse:94, Resp:14, SpO2:92 %  06/02/19 1103, BP:(!) 100/53, Pulse:87, Resp:12, SpO2:92 %  06/02/19 1003, BP:(!) 112/51, Pulse:106, Resp:17, SpO2:92 %  06/02/19 0933, BP:(!) 118/52, Pulse:101, Resp:14, SpO2:94 %  06/02/19 0757, Pulse:97, Resp:17, SpO2:94 %  06/02/19 0746, BP:(!) 103/47, Temp:97.8 °F (36.6 °C), Temp src:Temporal, Pulse:98, Resp:17, SpO2:94 %  06/02/19 0633, Resp:13, SpO2:94 %  06/02/19 0600, BP:(!) 100/44, Pulse:89, Resp:10, SpO2:93 %  06/02/19 0500, BP:(!) 107/46, Pulse:101, Resp:15, SpO2:93 %  06/02/19 0400, BP:(!) 112/54, Temp:98.8 °F (37.1 °C), Temp src:Temporal, Pulse:101, Resp:14, SpO2:92 %, Weight:144 lb 8 oz (65.5 kg)  06/02/19 0300, BP:(!) 93/54, Pulse:96, Resp:14, SpO2:93 %  06/02/19 0200, BP:(!) 87/42, Pulse:87, Resp:12, SpO2:94 %  06/02/19 0100, BP:(!) 85/44, Pulse:89, Resp:13, SpO2:92 %  06/02/19 0000, BP:(!) 102/58, Temp:98.2 °F (36.8 °C), Temp src:Temporal, Pulse:96, Resp:16, SpO2:92 %  06/01/19 2300, BP:(!) 83/48, Pulse:86, Resp:11, SpO2:91 %  06/01/19 2200, BP:(!) 87/49, Pulse:98, Resp:16, SpO2:92 %  Intake/Output Summary (Last 24 hours) at 06/02/19 2134  Last data filed at 06/02/19 2029          Gross per 24 hour  Intake:          4506.75 ml  Output:              815 ml  Net   :          3691.75 ml  General: awake/alert   HEENT: Normocephalic atraumatic head  Neck: Supple with no JVD or carotid bruits. Chest:  clear to auscultation bilaterally without respiratory distress  CVS: regular rate and rhythm  Abdominal: Midline scar from recent surgery and no bowel sounds. Extremities: no cyanosis or edema  Skin: warm and dry without rash      Labs:  BMP:                 06/01/19 06/01/19 06/02/19                       0905          1817          0435          NA           130*         133*         133*          K            3.6          4.5          4.3           CL           97*          102          103           CO2          22           20*          20*           BUN          19 20 22            CREATININE   1.6*         1.6*         1.9*          CALCIUM      7.9*         7.6*         8.0*          CBC:                 06/01/19 06/01/19 06/02/19                       0905          1830          0435          WBC          7.5          13.4*        15.8*         HGB          8.9*         7.8*         7.6*          HCT          28.3*        25.3*        24.5*         MCV          85.0         84.6         85.4          PLT          410*         333          339           LIVER PROFILE:                 06/01/19                       0905          AST          20            ALT          7             BILITOT      0.5           ALKPHOS      33*           PT/INR: No results for input(s): PROTIME, INR in the last 72 hours. APTT: No results for input(s): APTT in the last 72 hours. BNP:  No results for input(s): BNP in the last 72 hours. Ionized Calcium:No results for input(s): IONCA in the last 72 hours. Magnesium:                06/02/19                       0435          MG           1.3*          Phosphorus:No results for input(s): PHOS in the last 72 hours. HgbA1C: No results for input(s): LABA1C in the last 72 hours.   Hepatic: 06/01/19                       0905          ALKPHOS      33*           ALT          7             AST          20            PROT         4.6*          BILITOT      0.5           LABALBU      2.5*          Lactic Acid: No results for input(s): LACTA in the last 72 hours. Troponin: No results for input(s): CKTOTAL, CKMB, TROPONINT in the last 72 hours. ABGs: No results for input(s): PH, PCO2, PO2, HCO3, O2SAT in the last 72 hours. CRP:  No results for input(s): CRP in the last 72 hours. Sed Rate:  No results for input(s): SEDRATE in the last 72 hours. Cultures:   No results for input(s): CULTURE in the last 72 hours. No results for input(s): BC, Chappell Mires in the last 72 hours. No results for input(s): CXSURG in the last 72 hours. Radiology reports as per the Radiologist  Radiology: Us Renal Complete    Result Date: 6/2/2019  RENAL ULTRASOUND COMPLETE 6/2/2019 8:45 AM REASON FOR EXAM: Acute kidney injury  COMPARISON: None  TECHNIQUE: Multiple longitudinal and transverse realtime sonographic images of the kidneys and urinary bladder are obtained. FINDINGS: The right kidney measures 11.0 cm. The cortical thickness within the right kidney is 10 mm and 10 mm respectively in the upper and lower pole. The right kidney is normal in size, shape, contour and position. There is an exophytic cyst involving the upper pole of the right kidney measuring 2.3 x 1.9 x 1.8 cm in size. The cortical thickness is normal, with preservation of the corticomedullary differentiation. The central echo complex is compact with no evidence for hydronephrosis. No nephrolithiasis or abnormal perinephric fluid collections are seen. No hydroureter. The left kidney measures 11.5 cm. The cortical thickness within the left kidney is 19 mm  and 17 mm respectively in the upper and lower pole. The left kidney is normal in size, shape, contour and position.  There is a cyst involving the upper pole the left kidney measuring 1.9 x 1.9 x 2.6 cm in size. The cortical thickness is normal, with preservation of the corticomedullary differentiation. The central echo complex is compact with no evidence for hydronephrosis. No nephrolithiasis or abnormal perinephric fluid collections are seen. No hydroureter. Scanning through the pelvis demonstrates a Mclain catheter within the bladder. . The wall thickness and contour cannot be assessed. There is no surrounding ascites. 1. Cortical cysts of the kidneys. Otherwise normal renal ultrasound. 2. Mclain catheter in place within the bladder. . Signed by Dr Elbert Grant on 6/2/2019 10:38 AM    Xr Chest Portable    Result Date: 6/1/2019  EXAMINATION: Chest one view 6/1/2019 HISTORY: Line placement FINDINGS: Upright frontal projection of the chest demonstrates pulmonary venous hypertension with interstitial edema and small effusions. An NG tube has been advanced with the tip in the distal body of the stomach. A right IJ deep line has been placed with no evidence of complications. 1.. NG tube and right IJ deep line placed with no complications. They are well-positioned. 2. Pulmonary venous hypertension with interstitial edema and small effusions. Signed by Dr Elbert Grant on 6/1/2019 10:49 AM       Assessment  1. Acute kidney injury/volume depletion versus ATN  2. Perforated duodenal ulcer. 3. Status post expiratory laparotomy. 4. Stable hemodynamics. 5. Metabolic acidemia. 6. Severe anemia related to blood loss    Plan:  1. Change IV fluid  2. Urinary electrolytes. 3. Intravenous PPI  4. Baseline renal ultrasound      Thank you for the consult, we appreciate the opportunity to provide care to your patients. Feel free to contact me if I can be of any further assistance.       Henrry Quinones MD  06/02/19  9:34 PM

## 2019-06-03 NOTE — PROGRESS NOTES
[address] General Surgery    Progress Note    POD # 2    S: Sitting up in her bedside chair. More awake and talkative today. Denies shortness of breath. Reports mild incisional pain but overall that her abdomen feels better. Taking some ice chips. No flatus or bowel movement yet    O:   Vitals:    06/03/19 0700 06/03/19 0800 06/03/19 0900 06/03/19 1000   BP: (!) 126/47 (!) 129/50 (!) 133/50 (!) 126/91   Pulse: 113 99 111 99   Resp: 14 11 14 15   Temp:  98.1 °F (36.7 °C)     TempSrc:  Temporal     SpO2: 93% 94% 95% 94%   Weight:       Height:          I/O last 3 completed shifts: In: 4523.8 [I.V.:3373.8; IV [id number]:9355]  Out: 80 [Urine:790; Emesis/NG output:100; Drains:40]      Lungs are clear. Abdomen is slightly distended but soft. Incision clean and dry. NEHAL with serous output. Bowel sounds are hypoactive. Tenderness as expected. AM LABS:   CBC:   Recent Labs     06/01/19  1830 06/02/19  0435 06/03/19  0310   WBC 13.4* 15.8* 15.8*   RBC 2.99* 2.87* 2.82*   HGB 7.8* 7.6* 7.6*   HCT 25.3* 24.5* 24.4*    339 318   BANDSPCT 18* 4 8*   LYMPHOPCT 10.0* 8.0* 3.0*   MONOPCT 4.0 0.0 1.0   MYELOPCT 3*  --   --    BASOPCT 0.0 0.0 0.0   MONOSABS 0.50 0.00 0.20   LYMPHSABS 1.6 1.3 0.5*   EOSABS 0.00 0.00 0.16   BASOSABS 0.00 0.00 0.00      BMP:   Recent Labs     06/01/19  1817 06/02/19  0435 06/03/19  0310   * 133* 136   K 4.5 4.3 3.8    103 104   CO2 20* 20* 22   ANIONGAP 11 10 10   GLUCOSE 113* 135* 119*   CREATININE 1.6* 1.9* 1.8*   LABGLOM 30* 25* 27*   CALCIUM 7.6* 8.0* 8.0*     LFT:   Recent Labs     06/01/19  0905 06/03/19  0310   PROT 4.6* 4.7*   LABALBU 2.5* 2.5*   BILITOT 0.5 <0.2   ALKPHOS 33* 50   ALT 7 6   AST 20 18       A: 1. Continued slow but satisfactory recovery post repair of perforated duodenal ulcer. 2. Mild renal insufficiency, improving with fluid administration. 3. Mild respiratory insufficiency, improving with pulmonary toilet.    4. Hypertension on

## 2019-06-03 NOTE — PROGRESS NOTES
Nephrology ([facility]) Progress Note    Patient:  Stefan Whipple  YOB: 1931  Date of Service: 6/3/2019  MRN: [id number]   Acct: [de-identified]   Primary Care Physician: Leia Donahue  Advance Directive: Full Code  Admit Date: 6/1/2019       Hospital Day: 2  Referring Provider: Theresa Burton MD    Patient independently seen and examined, Chart, Consults, Notes, Operative notes, Labs, Cardiology, and Radiology studies reviewed as able. Subjective:  Stefan Whipple is a 80 y.o. female  whom we were consulted for acute kidney injury. Patient denies any history of chronic kidney disease. On June 1, she presented to the emergency room with severe abdominal pain. Five days prior to this presentation, she had been complaining of severe sciatica and has taken ibuprofen 400 mg at least 4-5 times a day. For the last 3 days prior to admission, she had been complaining of abdominal pain and the pain was getting worse, so the patient decided to call 911. On arrival to her local hospital, CT scan of the abdomen was done which was consistent with free air in the abdomen. She was transferred to [facility] for emergent laparotomy. Expiratory laparotomy was then done, consistent with perforated duodenal ulcer. Surgery was performed without any complication. Today, no new events per nursing. She has NG tube in and is allowed to have ice chips. She appears pleasantly confused. Is able to answer questions appropriately with some redirection, denies chest pain, shortness of air, nausea or vomiting. Allergies:  Patient has no known allergies.     Medicines:  Current Facility-Administered Medications   Medication Dose Route Frequency Provider Last Rate Last Dose    ceFAZolin (ANCEF) 1 g in dextrose 5 % 50 mL IVPB (premix)  1 g Intravenous Q12H Theresa Burton MD        ipratropium-albuterol (DUONEB) nebulizer solution 1 ampule  1 ampule Inhalation Q4H Gaby Bearden MD children: 4    Years of education: Not on file    Highest education level: Not on file   Occupational History    Occupation: retired teacher at [employer]   Social Needs    Financial resource strain: Not on file    Food insecurity:     Worry: Not on file     Inability: Not on file   [employer] needs:     Medical: Not on file     Non-medical: Not on file   Tobacco Use    Smoking status: Never Smoker    Smokeless tobacco: Never Used   Substance and Sexual Activity    Alcohol use: Not Currently    Drug use: Never    Sexual activity: Not on file   Lifestyle    Physical activity:     Days per week: Not on file     Minutes per session: Not on file    Stress: Not on file   Relationships    Social connections:     Talks on phone: Not on file     Gets together: Not on file     Attends Hindu service: Not on file     Active member of club or organization: Not on file     Attends meetings of clubs or organizations: Not on file     Relationship status: Not on file    Intimate partner violence:     Fear of current or ex partner: Not on file     Emotionally abused: Not on file     Physically abused: Not on file     Forced sexual activity: Not on file   Other Topics Concern    Not on file   Social History Narrative    CODE STATUS: Full Code    HEALTH CARE PROXY: her daughter, Mrs. Clau Willard: lives in a private home, no stairs inside home, lives alone, has 1 step in to home    AMBULATES: ambulates independantly         Review of Systems:  History obtained from chart review and the patient  General ROS: No fever or chills  Respiratory ROS: No cough, shortness of breath, wheezing  Cardiovascular ROS: No chest pain or palpitations  Gastrointestinal ROS: No abdominal pain or melena  Genito-Urinary ROS: No dysuria or hematuria  Musculoskeletal ROS: No joint pain or swelling         Objective:  Patient Vitals for the past 24 hrs:   BP Temp Temp src Pulse Resp SpO2 Weight   06/03/19 1000 (!) 126/91 -- -- 99 15 94 % --   06/03/19 0900 (!) 133/50 -- -- 111 14 95 % --   06/03/19 0800 (!) 129/50 98.1 °F (36.7 °C) Temporal 99 11 94 % --   06/03/19 0700 (!) 126/47 -- -- 113 14 93 % --   06/03/19 0600 (!) 129/52 -- -- 114 16 93 % --   06/03/19 0500 (!) 110/53 -- -- 102 15 93 % --   06/03/19 0400 139/67 98.7 °F (37.1 °C) Temporal 120 17 95 % 153 lb 12.8 oz (69.8 kg)   06/03/19 0300 (!) 118/59 -- -- 110 14 94 % --   06/03/19 0200 (!) 99/47 -- -- 97 10 94 % --   06/03/19 0100 (!) 103/52 -- -- 94 10 95 % --   06/03/19 0000 (!) 100/52 99.8 °F (37.7 °C) Temporal 104 12 92 % --   06/02/19 2300 (!) 109/56 -- -- 100 12 93 % --   06/02/19 2200 (!) 128/51 -- -- 119 17 91 % --   06/02/19 2100 (!) 109/45 -- -- 104 15 92 % --   06/02/19 2036 -- -- -- -- 14 93 % --   06/02/19 2000 123/61 100 °F (37.8 °C) Temporal 100 12 96 % --   06/02/19 1900 (!) 107/49 -- -- 96 13 92 % --   06/02/19 1803 (!) 99/50 -- -- 93 11 95 % --   06/02/19 1733 (!) 101/48 -- -- 90 11 92 % --   06/02/19 1703 95/68 -- -- 99 16 94 % --   06/02/19 1612 (!) 127/44 98.9 °F (37.2 °C) Temporal 110 21 94 % --   06/02/19 1503 (!) 121/52 -- -- 102 20 92 % --   06/02/19 1421 -- -- -- -- 16 90 % --   06/02/19 1403 (!) 130/54 -- -- 106 19 91 % --   06/02/19 1303 (!) 107/52 -- -- 93 14 90 % --   06/02/19 1218 (!) 106/51 98.2 °F (36.8 °C) Temporal 94 14 92 % --   06/02/19 1103 (!) 100/53 -- -- 87 12 92 % --       Intake/Output Summary (Last 24 hours) at 6/3/2019 1028  Last data filed at 6/3/2019 1000  Gross per 24 hour   Intake 4637 ml   Output 755 ml   Net 3882 ml     General: awake/alert   Chest:  clear to auscultation bilaterally without respiratory distress  CVS: regular rate and rhythm  Abdominal: soft, nontender, normal bowel sounds  Extremities: no cyanosis or edema  Skin: warm and dry without rash    Labs:  BMP:   Recent Labs     06/01/19  1817 06/02/19  0435 06/03/19  0310   * 133* 136   K 4.5 4.3 3.8    103 104   CO2 20* 20* 22   BUN 20 22 23 CREATININE 1.6* 1.9* 1.8*   CALCIUM 7.6* 8.0* 8.0*     CBC:   Recent Labs     06/01/19  1830 06/02/19  0435 06/03/19  0310   WBC 13.4* 15.8* 15.8*   HGB 7.8* 7.6* 7.6*   HCT 25.3* 24.5* 24.4*   MCV 84.6 85.4 86.5    339 318     LIVER PROFILE:   Recent Labs     06/01/19  0905 06/03/19  0310   AST 20 18   ALT 7 6   BILITOT 0.5 <0.2   ALKPHOS 33* 50     PT/INR: No results for input(s): PROTIME, INR in the last 72 hours. APTT: No results for input(s): APTT in the last 72 hours. BNP:  No results for input(s): BNP in the last 72 hours. Ionized Calcium:No results for input(s): IONCA in the last 72 hours. Magnesium:  Recent Labs     06/02/19 0435   MG 1.3*     Phosphorus:No results for input(s): PHOS in the last 72 hours. HgbA1C: No results for input(s): LABA1C in the last 72 hours. Hepatic:   Recent Labs     06/01/19  0905 06/03/19 0310   ALKPHOS 33* 50   ALT 7 6   AST 20 18   PROT 4.6* 4.7*   BILITOT 0.5 <0.2   LABALBU 2.5* 2.5*     Lactic Acid: No results for input(s): LACTA in the last 72 hours. Troponin: No results for input(s): CKTOTAL, CKMB, TROPONINT in the last 72 hours. ABGs: No results found for: PHART, PO2ART, SCS1YNU  CRP:  No results for input(s): CRP in the last 72 hours. Sed Rate:  No results for input(s): SEDRATE in the last 72 hours. Culture Results:   Blood Culture Recent: No results for input(s): BC in the last 720 hours. Urine Culture Recent : No results for input(s): LABURIN in the last 720 hours. Radiology reports as per the Radiologist  Radiology: Us Renal Complete    Result Date: 6/2/2019  RENAL ULTRASOUND COMPLETE 6/2/2019 8:45 AM REASON FOR EXAM: Acute kidney injury  COMPARISON: None  TECHNIQUE: Multiple longitudinal and transverse realtime sonographic images of the kidneys and urinary bladder are obtained. FINDINGS: The right kidney measures 11.0 cm. The cortical thickness within the right kidney is 10 mm and 10 mm respectively in the upper and lower pole.   The right kidney is normal in size, shape, contour and position. There is an exophytic cyst involving the upper pole of the right kidney measuring 2.3 x 1.9 x 1.8 cm in size. The cortical thickness is normal, with preservation of the corticomedullary differentiation. The central echo complex is compact with no evidence for hydronephrosis. No nephrolithiasis or abnormal perinephric fluid collections are seen. No hydroureter. The left kidney measures 11.5 cm. The cortical thickness within the left kidney is 19 mm  and 17 mm respectively in the upper and lower pole. The left kidney is normal in size, shape, contour and position. There is a cyst involving the upper pole the left kidney measuring 1.9 x 1.9 x 2.6 cm in size. The cortical thickness is normal, with preservation of the corticomedullary differentiation. The central echo complex is compact with no evidence for hydronephrosis. No nephrolithiasis or abnormal perinephric fluid collections are seen. No hydroureter. Scanning through the pelvis demonstrates a Mclain catheter within the bladder. . The wall thickness and contour cannot be assessed. There is no surrounding ascites. 1. Cortical cysts of the kidneys. Otherwise normal renal ultrasound. 2. Mclain catheter in place within the bladder. . Signed by Dr Robbin Persaud on 6/2/2019 10:38 AM    Xr Chest Portable    Result Date: 6/1/2019  EXAMINATION: Chest one view 6/1/2019 HISTORY: Line placement FINDINGS: Upright frontal projection of the chest demonstrates pulmonary venous hypertension with interstitial edema and small effusions. An NG tube has been advanced with the tip in the distal body of the stomach. A right IJ deep line has been placed with no evidence of complications. 1.. NG tube and right IJ deep line placed with no complications. They are well-positioned. 2. Pulmonary venous hypertension with interstitial edema and small effusions.  Signed by Dr Robbin Persaud on 6/1/2019 10:49 AM       Assessment   Acute

## 2019-06-04 LAB
ANION GAP SERPL CALCULATED.3IONS-SCNC: 11 MMOL/L (ref 7–19)
BASOPHILS ABSOLUTE: 0 K/UL (ref 0–0.2)
BASOPHILS RELATIVE PERCENT: 0.1 % (ref 0–1)
BLOOD BANK DISPENSE STATUS: NORMAL
BLOOD BANK PRODUCT CODE: NORMAL
BPU ID: NORMAL
BUN BLDV-MCNC: 23 MG/DL (ref 8–23)
CALCIUM SERPL-MCNC: 8.1 MG/DL (ref 8.8–10.2)
CHLORIDE BLD-SCNC: 104 MMOL/L (ref 98–111)
CO2: 24 MMOL/L (ref 22–29)
CREAT SERPL-MCNC: 1.7 MG/DL (ref 0.5–0.9)
DESCRIPTION BLOOD BANK: NORMAL
EOSINOPHILS ABSOLUTE: 0.2 K/UL (ref 0–0.6)
EOSINOPHILS RELATIVE PERCENT: 1.4 % (ref 0–5)
GFR NON-AFRICAN AMERICAN: 28
GLUCOSE BLD-MCNC: 108 MG/DL (ref 74–109)
HCT VFR BLD CALC: 23 % (ref 37–47)
HEMOGLOBIN: 7.1 G/DL (ref 12–16)
LYMPHOCYTES ABSOLUTE: 0.9 K/UL (ref 1.1–4.5)
LYMPHOCYTES RELATIVE PERCENT: 5.5 % (ref 20–40)
MCH RBC QN AUTO: 26.4 PG (ref 27–31)
MCHC RBC AUTO-ENTMCNC: 30.9 G/DL (ref 33–37)
MCV RBC AUTO: 85.5 FL (ref 81–99)
MONOCYTES ABSOLUTE: 0.8 K/UL (ref 0–0.9)
MONOCYTES RELATIVE PERCENT: 4.9 % (ref 0–10)
NEUTROPHILS ABSOLUTE: 14.1 K/UL (ref 1.5–7.5)
NEUTROPHILS RELATIVE PERCENT: 87.5 % (ref 50–65)
PDW BLD-RTO: 14.5 % (ref 11.5–14.5)
PLATELET # BLD: 337 K/UL (ref 130–400)
PMV BLD AUTO: 9 FL (ref 9.4–12.3)
POTASSIUM SERPL-SCNC: 3.4 MMOL/L (ref 3.5–5)
RBC # BLD: 2.69 M/UL (ref 4.2–5.4)
SODIUM BLD-SCNC: 139 MMOL/L (ref 136–145)
WBC # BLD: 16.1 K/UL (ref 4.8–10.8)

## 2019-06-04 PROCEDURE — 2580000003 HC RX 258: Performed by: INTERNAL MEDICINE

## 2019-06-04 PROCEDURE — P9016 RBC LEUKOCYTES REDUCED: HCPCS

## 2019-06-04 PROCEDURE — 99024 POSTOP FOLLOW-UP VISIT: CPT | Performed by: SURGERY

## 2019-06-04 PROCEDURE — 2700000000 HC OXYGEN THERAPY PER DAY

## 2019-06-04 PROCEDURE — 6370000000 HC RX 637 (ALT 250 FOR IP): Performed by: SURGERY

## 2019-06-04 PROCEDURE — 6360000002 HC RX W HCPCS: Performed by: SURGERY

## 2019-06-04 PROCEDURE — 85025 COMPLETE CBC W/AUTO DIFF WBC: CPT

## 2019-06-04 PROCEDURE — 36592 COLLECT BLOOD FROM PICC: CPT

## 2019-06-04 PROCEDURE — 2500000003 HC RX 250 WO HCPCS: Performed by: HOSPITALIST

## 2019-06-04 PROCEDURE — 2580000003 HC RX 258: Performed by: SURGERY

## 2019-06-04 PROCEDURE — 2000000000 HC ICU R&B

## 2019-06-04 PROCEDURE — 6360000002 HC RX W HCPCS: Performed by: INTERNAL MEDICINE

## 2019-06-04 PROCEDURE — 99232 SBSQ HOSP IP/OBS MODERATE 35: CPT | Performed by: HOSPITALIST

## 2019-06-04 PROCEDURE — 80048 BASIC METABOLIC PNL TOTAL CA: CPT

## 2019-06-04 PROCEDURE — 86923 COMPATIBILITY TEST ELECTRIC: CPT

## 2019-06-04 PROCEDURE — C9113 INJ PANTOPRAZOLE SODIUM, VIA: HCPCS | Performed by: SURGERY

## 2019-06-04 PROCEDURE — 94640 AIRWAY INHALATION TREATMENT: CPT

## 2019-06-04 PROCEDURE — 36430 TRANSFUSION BLD/BLD COMPNT: CPT

## 2019-06-04 RX ORDER — MORPHINE SULFATE 2 MG/ML
0.5 INJECTION, SOLUTION INTRAMUSCULAR; INTRAVENOUS EVERY 30 MIN PRN
Status: DISCONTINUED | OUTPATIENT
Start: 2019-06-04 | End: 2019-06-12 | Stop reason: HOSPADM

## 2019-06-04 RX ORDER — POTASSIUM CHLORIDE 29.8 MG/ML
20 INJECTION INTRAVENOUS ONCE
Status: COMPLETED | OUTPATIENT
Start: 2019-06-04 | End: 2019-06-04

## 2019-06-04 RX ORDER — 0.9 % SODIUM CHLORIDE 0.9 %
250 INTRAVENOUS SOLUTION INTRAVENOUS ONCE
Status: COMPLETED | OUTPATIENT
Start: 2019-06-04 | End: 2019-06-04

## 2019-06-04 RX ADMIN — ENOXAPARIN SODIUM 30 MG: 30 INJECTION SUBCUTANEOUS at 09:00

## 2019-06-04 RX ADMIN — METOPROLOL TARTRATE 2.5 MG: 5 INJECTION, SOLUTION INTRAVENOUS at 17:44

## 2019-06-04 RX ADMIN — IPRATROPIUM BROMIDE AND ALBUTEROL SULFATE 1 AMPULE: 2.5; .5 SOLUTION RESPIRATORY (INHALATION) at 11:06

## 2019-06-04 RX ADMIN — METOPROLOL TARTRATE 2.5 MG: 5 INJECTION, SOLUTION INTRAVENOUS at 22:49

## 2019-06-04 RX ADMIN — Medication 10 ML: at 21:15

## 2019-06-04 RX ADMIN — METOPROLOL TARTRATE 2.5 MG: 5 INJECTION, SOLUTION INTRAVENOUS at 04:23

## 2019-06-04 RX ADMIN — MORPHINE SULFATE 0.5 MG: 2 INJECTION, SOLUTION INTRAMUSCULAR; INTRAVENOUS at 07:35

## 2019-06-04 RX ADMIN — PANTOPRAZOLE SODIUM 8 MG/HR: 40 INJECTION, POWDER, FOR SOLUTION INTRAVENOUS at 14:26

## 2019-06-04 RX ADMIN — CEFAZOLIN SODIUM 1 G: 1 INJECTION, SOLUTION INTRAVENOUS at 00:31

## 2019-06-04 RX ADMIN — MORPHINE SULFATE 0.5 MG: 2 INJECTION, SOLUTION INTRAMUSCULAR; INTRAVENOUS at 20:48

## 2019-06-04 RX ADMIN — PANTOPRAZOLE SODIUM 8 MG/HR: 40 INJECTION, POWDER, FOR SOLUTION INTRAVENOUS at 04:16

## 2019-06-04 RX ADMIN — ASCORBIC ACID, VITAMIN A PALMITATE, CHOLECALCIFEROL, THIAMINE HYDROCHLORIDE, RIBOFLAVIN-5 PHOSPHATE SODIUM, PYRIDOXINE HYDROCHLORIDE, NIACINAMIDE, DEXPANTHENOL, ALPHA-TOCOPHEROL ACETATE, VITAMIN K1, FOLIC ACID, BIOTIN, CYANOCOBALAMIN: 200; 3300; 200; 6; 3.6; 6; 40; 15; 10; 150; 600; 60; 5 INJECTION, SOLUTION INTRAVENOUS at 21:00

## 2019-06-04 RX ADMIN — CEFAZOLIN SODIUM 1 G: 1 INJECTION, SOLUTION INTRAVENOUS at 11:16

## 2019-06-04 RX ADMIN — SODIUM CHLORIDE, POTASSIUM CHLORIDE, SODIUM LACTATE AND CALCIUM CHLORIDE: 600; 310; 30; 20 INJECTION, SOLUTION INTRAVENOUS at 22:37

## 2019-06-04 RX ADMIN — SODIUM CHLORIDE 250 ML: 9 INJECTION, SOLUTION INTRAVENOUS at 10:06

## 2019-06-04 RX ADMIN — SODIUM CHLORIDE, POTASSIUM CHLORIDE, SODIUM LACTATE AND CALCIUM CHLORIDE: 600; 310; 30; 20 INJECTION, SOLUTION INTRAVENOUS at 09:07

## 2019-06-04 RX ADMIN — POTASSIUM CHLORIDE 20 MEQ: 29.8 INJECTION, SOLUTION INTRAVENOUS at 13:11

## 2019-06-04 RX ADMIN — IPRATROPIUM BROMIDE AND ALBUTEROL SULFATE 1 AMPULE: 2.5; .5 SOLUTION RESPIRATORY (INHALATION) at 18:39

## 2019-06-04 RX ADMIN — METOPROLOL TARTRATE 2.5 MG: 5 INJECTION, SOLUTION INTRAVENOUS at 11:16

## 2019-06-04 RX ADMIN — Medication 10 ML: at 09:00

## 2019-06-04 RX ADMIN — IPRATROPIUM BROMIDE AND ALBUTEROL SULFATE 1 AMPULE: 2.5; .5 SOLUTION RESPIRATORY (INHALATION) at 14:07

## 2019-06-04 RX ADMIN — IPRATROPIUM BROMIDE AND ALBUTEROL SULFATE 1 AMPULE: 2.5; .5 SOLUTION RESPIRATORY (INHALATION) at 06:48

## 2019-06-04 ASSESSMENT — PAIN SCALES - GENERAL
PAINLEVEL_OUTOF10: 0
PAINLEVEL_OUTOF10: 0
PAINLEVEL_OUTOF10: 3
PAINLEVEL_OUTOF10: 3
PAINLEVEL_OUTOF10: 7
PAINLEVEL_OUTOF10: 2
PAINLEVEL_OUTOF10: 2
PAINLEVEL_OUTOF10: 10

## 2019-06-04 ASSESSMENT — PAIN DESCRIPTION - PAIN TYPE: TYPE: SURGICAL PAIN

## 2019-06-04 NOTE — PROGRESS NOTES
 Occupation: retired teacher at [employer]   Social Needs    Financial resource strain: Not on file    Food insecurity:     Worry: Not on file     Inability: Not on file   [employer] needs:     Medical: Not on file     Non-medical: Not on file   Tobacco Use    Smoking status: Never Smoker    Smokeless tobacco: Never Used   Substance and Sexual Activity    Alcohol use: Not Currently    Drug use: Never    Sexual activity: Not on file   Lifestyle    Physical activity:     Days per week: Not on file     Minutes per session: Not on file    Stress: Not on file   Relationships    Social connections:     Talks on phone: Not on file     Gets together: Not on file     Attends Sikh service: Not on file     Active member of club or organization: Not on file     Attends meetings of clubs or organizations: Not on file     Relationship status: Not on file    Intimate partner violence:     Fear of current or ex partner: Not on file     Emotionally abused: Not on file     Physically abused: Not on file     Forced sexual activity: Not on file   Other Topics Concern    Not on file   Social History Narrative    CODE STATUS: Full Code    HEALTH CARE PROXY: her daughter, Mrs. Brian Paz: lives in a private home, no stairs inside home, lives alone, has 1 step in to home    AMBULATES: ambulates independantly         Review of Systems:  History obtained from chart review and the patient  General ROS: No fever or chills  Respiratory ROS: No cough, shortness of breath, wheezing  Cardiovascular ROS: No chest pain or palpitations  Gastrointestinal ROS: No abdominal pain or melena  Genito-Urinary ROS: No dysuria or hematuria  Musculoskeletal ROS: No joint pain or swelling         Objective:  Patient Vitals for the past 24 hrs:   BP Temp Temp src Pulse Resp SpO2 Weight   06/04/19 1300 (!) 147/65 98.3 °F (36.8 °C) -- 100 13 96 % --   06/04/19 1200 (!) 140/67 97.6 °F (36.4 °C) Temporal 101 12 95 % -- 06/04/19 1107 -- -- -- -- 16 97 % --   06/04/19 1100 -- -- -- 109 13 95 % --   06/04/19 1014 -- 97.8 °F (36.6 °C) -- 113 17 96 % --   06/04/19 1000 (!) 164/78 -- -- 116 17 95 % --   06/04/19 0957 (!) 164/78 97.9 °F (36.6 °C) -- 124 23 -- --   06/04/19 0900 (!) 144/68 -- -- 114 17 95 % --   06/04/19 0800 (!) 146/67 98.5 °F (36.9 °C) Temporal 119 16 94 % --   06/04/19 0700 (!) 117/96 -- -- 112 20 96 % --   06/04/19 0650 -- -- -- -- 18 94 % --   06/04/19 0600 (!) 150/68 -- -- 108 16 91 % --   06/04/19 0500 (!) 143/59 -- -- 109 25 94 % 161 lb 4.8 oz (73.2 kg)   06/04/19 0400 (!) 132/52 98.1 °F (36.7 °C) Temporal 104 17 94 % --   06/04/19 0300 (!) 121/55 -- -- 107 13 95 % --   06/04/19 0200 (!) 125/51 -- -- 104 13 97 % --   06/04/19 0100 (!) 122/47 -- -- 100 14 95 % --   06/04/19 0000 124/61 98.8 °F (37.1 °C) Temporal 102 12 92 % --   06/03/19 2300 107/71 -- -- 108 14 94 % --   06/03/19 2200 (!) 125/56 -- -- 112 15 95 % --   06/03/19 2100 (!) 128/51 -- -- 112 17 93 % --   06/03/19 2000 (!) 130/48 99.8 °F (37.7 °C) Temporal 114 16 93 % --   06/03/19 1900 (!) 117/58 -- -- 112 14 93 % --   06/03/19 1800 (!) 115/54 -- -- 91 10 94 % --   06/03/19 1700 (!) 109/51 -- -- 90 11 95 % --   06/03/19 1609 130/63 98.1 °F (36.7 °C) Temporal 117 21 93 % --   06/03/19 1500 112/70 -- -- 117 16 91 % --   06/03/19 1400 (!) 106/39 -- -- 106 14 96 % --       Intake/Output Summary (Last 24 hours) at 6/4/2019 1351  Last data filed at 6/4/2019 1200  Gross per 24 hour   Intake 2595.17 ml   Output 1202 ml   Net 1393.17 ml     General: awake/alert   Chest:  clear to auscultation bilaterally without respiratory distress  CVS: regular rate and rhythm  Abdominal: soft, nontender, normal bowel sounds  Extremities: no cyanosis or edema  Skin: warm and dry without rash    Labs:  BMP:   Recent Labs     06/02/19  0435 06/03/19  0310 06/04/19  0418   * 136 139   K 4.3 3.8 3.4*    104 104   CO2 20* 22 24   BUN 22 23 23   CREATININE 1.9* 1.8* 1.7*   CALCIUM 8.0* 8.0* 8.1*     CBC:   Recent Labs     06/02/19  0435 06/03/19  0310 06/04/19  0418   WBC 15.8* 15.8* 16.1*   HGB 7.6* 7.6* 7.1*   HCT 24.5* 24.4* 23.0*   MCV 85.4 86.5 85.5    318 337     LIVER PROFILE:   Recent Labs     06/03/19  0310   AST 18   ALT 6   BILITOT <0.2   ALKPHOS 50     PT/INR: No results for input(s): PROTIME, INR in the last 72 hours. APTT: No results for input(s): APTT in the last 72 hours. BNP:  No results for input(s): BNP in the last 72 hours. Ionized Calcium:No results for input(s): IONCA in the last 72 hours. Magnesium:  Recent Labs     06/02/19  0435   MG 1.3*     Phosphorus:No results for input(s): PHOS in the last 72 hours. HgbA1C: No results for input(s): LABA1C in the last 72 hours. Hepatic:   Recent Labs     06/03/19 0310   ALKPHOS 50   ALT 6   AST 18   PROT 4.7*   BILITOT <0.2   LABALBU 2.5*     Lactic Acid: No results for input(s): LACTA in the last 72 hours. Troponin: No results for input(s): CKTOTAL, CKMB, TROPONINT in the last 72 hours. ABGs: No results found for: PHART, PO2ART, [id number]  CRP:  No results for input(s): CRP in the last 72 hours. Sed Rate:  No results for input(s): SEDRATE in the last 72 hours. Culture Results:   Blood Culture Recent: No results for input(s): BC in the last 720 hours. Urine Culture Recent : No results for input(s): LABURIN in the last 720 hours. Radiology reports as per the Radiologist  Radiology: Us Renal Complete    Result Date: 6/2/2019  RENAL ULTRASOUND COMPLETE 6/2/2019 8:45 AM REASON FOR EXAM: Acute kidney injury  COMPARISON: None  TECHNIQUE: Multiple longitudinal and transverse realtime sonographic images of the kidneys and urinary bladder are obtained. FINDINGS: The right kidney measures 11.0 cm. The cortical thickness within the right kidney is 10 mm and 10 mm respectively in the upper and lower pole. The right kidney is normal in size, shape, contour and position.  There is an exophytic cyst involving the upper pole of the right kidney measuring 2.3 x 1.9 x 1.8 cm in size. The cortical thickness is normal, with preservation of the corticomedullary differentiation. The central echo complex is compact with no evidence for hydronephrosis. No nephrolithiasis or abnormal perinephric fluid collections are seen. No hydroureter. The left kidney measures 11.5 cm. The cortical thickness within the left kidney is 19 mm  and 17 mm respectively in the upper and lower pole. The left kidney is normal in size, shape, contour and position. There is a cyst involving the upper pole the left kidney measuring 1.9 x 1.9 x 2.6 cm in size. The cortical thickness is normal, with preservation of the corticomedullary differentiation. The central echo complex is compact with no evidence for hydronephrosis. No nephrolithiasis or abnormal perinephric fluid collections are seen. No hydroureter. Scanning through the pelvis demonstrates a Mclain catheter within the bladder. . The wall thickness and contour cannot be assessed. There is no surrounding ascites. 1. Cortical cysts of the kidneys. Otherwise normal renal ultrasound. 2. Mclain catheter in place within the bladder. . Signed by Dr Leyda Slade on 6/2/2019 10:38 AM    Xr Chest Portable    Result Date: 6/1/2019  EXAMINATION: Chest one view 6/1/2019 HISTORY: Line placement FINDINGS: Upright frontal projection of the chest demonstrates pulmonary venous hypertension with interstitial edema and small effusions. An NG tube has been advanced with the tip in the distal body of the stomach. A right IJ deep line has been placed with no evidence of complications. 1.. NG tube and right IJ deep line placed with no complications. They are well-positioned. 2. Pulmonary venous hypertension with interstitial edema and small effusions.  Signed by Dr Leyda Slade on 6/1/2019 10:49 AM       Assessment   Acute kidney injury with unknown baseline  Suspect some chronic kidney disease at baseline  Perforated duodenal ulcer status post exploratory laparotomy  Metabolic acidosis  Acute blood loss anemia with iron deficiency  Vitamin D deficiency  Secondary hyperparathyroidism    Plan:  Discussed with nursing, hospitalist  Add vitamin D when taking by mouth  Replace potassium IV  Agree with TPN  Transfused packed red blood cells as needed  Iron replacement    Keyla Antunez MD  06/04/19  1:51 PM

## 2019-06-04 NOTE — PROGRESS NOTES
Subjective:   Critical Care Daily Progress Note: 6/4/2019 7:16 AM    Interval History:   04 JUN 19   POD #3 s/p repair of perforated PUDLaparotomy with [place] patch repair of perforated duodenal   Ulcer, replace VIT D, HB 7.1 transfuse per surgery, CR 1.7 suspect chronic,PT OT up in chair still not passing gas , avoid pain meds NSAIDS abuse , ? Transfer out of unit per surgery in am d/c NGT today ? Shalini Hunt 19  POD #2 s/p repair of perforated PUDLaparotomy with [place] [facility] patch repair of perforated duodenal  ulcer.  , JEFF 2 to [facility] nephrology on board VIT D deficiency supplemented, HB 7.6 anemia of chronic disease  , daughter at bedside, PT OT up in chair still not passing gas , avoid pain meds NSAIDS abuse   [id number]:  Mrs. Anshu Saleh is a pleasant lady feeling better. She had got up with her RN and myself to the chair about noon. She spent about four hours in her chair. She states that her mouth is dry. Deandre Peters no complaints to open ended questioning. [id number]:  Mrs. Anshu Saleh is a pleasant 80year old  american lady from home last in a hospital when she had delivered her child here many years ago. She came in for pain across her chest. She states that she thought she was having a heart attack as she was unable to eat and had pain across her chest. She also tells us, as she told her surgeon,  that she had the start of sciatica which was sudden and nontraumatic and she began to take up to 4 tabs a day of ibuprofen. She was admitted, after she went to [facility] and was told that they thought that she needed to have abdominal surgery. She has asked them to transfer her to [facility] at that time. She was accepted by Dr. Dvaid Rodriguez at that time for surgery. Review of Systems  She has: fevers, weakness, confusion, hallucinations, neck and back and shoulder pains which she relates to her bed, and a dry mouth.   She denies: chills, night sweats, malaise, fatigue, chest pain, dyspnea, diaphoresis, dyspnea, nausea, and near syncope. Scheduled Meds:   ceFAZolin  1 g Intravenous Q12H    ipratropium-albuterol  1 ampule Inhalation Q4H WA    enoxaparin  30 mg Subcutaneous Daily    sodium chloride flush  10 mL Intravenous 2 times per day    metoprolol  2.5 mg Intravenous Q6H     Continuous Infusions:   lactated ringers 100 mL/hr at 06/03/19 1239    pantoprozole (PROTONIX) infusion 8 mg/hr (06/04/19 0416)     PRN Meds:morphine, ondansetron, sodium chloride flush        Objective:     I/O last 3 completed shifts: In: 4610 [I.V.:2885; NG/GT:20; IV Piggyback:100]  Out: 8868 [Urine:1130; Emesis/NG output:50; Drains:17]  No intake/output data recorded. BP (!) 150/68   Pulse 108   Temp 98.1 °F (36.7 °C) (Temporal)   Resp 18   Ht 5' 4\" (1.626 m)   Wt 161 lb 4.8 oz (73.2 kg)   SpO2 94%   BMI 27.69 kg/m²     Physical Exam   Constitutional: She appears well-developed and well-nourished. No distress. up in chair    HENT: NGT in daughter at bedside   Head: Normocephalic and atraumatic. Eyes: Right eye exhibits no discharge. Left eye exhibits no discharge. No scleral icterus. Neck: Neck supple. No tracheal deviation present. Cardiovascular: Normal rate and regular rhythm. Exam reveals no gallop and no friction rub. No murmur heard. Pulmonary/Chest: Effort normal. No stridor. No respiratory distress. She has no wheezes. She has no rales. She exhibits no tenderness. Abdominal: She exhibits no distension. There is no rebound and no guarding. Bowel sounds diminished  Abdomen is tender. Musculoskeletal: She exhibits no edema, tenderness or deformity. Neurological: She is alert. Skin: Skin is warm and dry. She is not diaphoretic. Psychiatric: She has a normal mood and affect. Her behavior is normal.   Vital Signs reviewed    LABS:   Results for Benito Frank (MRN [id number]) as of 6/2/2019 18:35   Ref.  Range 6/2/2019 04:35 6/2/2019 04:40   Sodium Latest Ref Range: 136 - 145 mmol/L 133 (L)    Potassium Latest Ref Range: 3.5 - 5.0 mmol/L 4.3    Chloride Latest Ref Range: 98 - 111 mmol/L 103    CO2 Latest Ref Range: 22 - 29 mmol/L 20 (L)    BUN Latest Ref Range: 8 - 23 mg/dL 22    Creatinine Latest Ref Range: 0.5 - 0.9 mg/dL 1.9 (H)    Anion Gap Latest Ref Range: 7 - 19 mmol/L 10    GFR Non- Latest Ref Range: >60  25 (A)    Magnesium Latest Ref Range: 1.6 - 2.4 mg/dL 1.3 (L)    Glucose Latest Ref Range: 74 - 109 mg/dL 135 (H)    Calcium Latest Ref Range: 8.8 - 10.2 mg/dL 8.0 (L)    WBC Latest Ref Range: 4.8 - 10.8 K/uL 15.8 (H)    RBC Latest Ref Range: 4.20 - 5.40 M/uL 2.87 (L)    Hemoglobin Quant Latest Ref Range: 12.0 - 16.0 g/dL 7.6 (L)    Hematocrit Latest Ref Range: 37.0 - 47.0 % 24.5 (L)    MCV Latest Ref Range: 81.0 - 99.0 fL 85.4    MCH Latest Ref Range: 27.0 - 31.0 pg 26.5 (L)    MCHC Latest Ref Range: 33.0 - 37.0 g/dL 31.0 (L)    MPV Latest Ref Range: 9.4 - 12.3 fL 8.7 (L)    RDW Latest Ref Range: 11.5 - 14.5 % 14.0    Platelet Count Latest Ref Range: 130 - 400 K/uL 339    Neutrophils % Latest Ref Range: 50.0 - 65.0 % 88.0 (H)    Lymphocyte % Latest Ref Range: 20.0 - 40.0 % 8.0 (L)    Monocytes % Latest Ref Range: 0.0 - 10.0 % 0.0    Eosinophils % Latest Ref Range: 0.0 - 5.0 % 0.0    Basophils % Latest Ref Range: 0.0 - 1.0 % 0.0    Neutrophils # Latest Ref Range: 1.5 - 7.5 K/uL 14.5 (H)    Lymphocytes # Latest Ref Range: 1.1 - 4.5 K/uL 1.3    Monocytes # Latest Ref Range: 0.00 - 0.90 K/uL 0.00    Eosinophils # Latest Ref Range: 0.00 - 0.60 K/uL 0.00    Basophils # Latest Ref Range: 0.00 - 0.20 K/uL 0.00    Bands Relative Latest Ref Range: 0 - 5 % 4    Hypochromia Unknown 1+ (A)    PLATELET SLIDE REVIEW Unknown Adequate    Creatinine, Ur Latest Ref Range: 4.2 - 622.0 mg/dL  94.2   Sodium, Ur Latest Units: mmol/L  21.0     DIAGNOSTICS:  Renal US [id number]:  Narrative   RENAL ULTRASOUND COMPLETE 6/2/2019 8:45 AM   REASON FOR EXAM: Acute kidney injury     COMPARISON: None   TECHNIQUE: Multiple longitudinal and transverse realtime sonographic   images of the kidneys and urinary bladder are obtained. FINDINGS:    The right kidney measures 11.0 cm. The cortical thickness within the   right kidney is 10 mm and 10 mm respectively in the upper and lower   pole.  The right kidney is normal in size, shape, contour and   position. There is an exophytic cyst involving the upper pole of the   right kidney measuring 2.3 x 1.9 x 1.8 cm in size. The cortical   thickness is normal, with preservation of the corticomedullary   differentiation. The central echo complex is compact with no evidence   for hydronephrosis. No nephrolithiasis or abnormal perinephric fluid   collections are seen. No hydroureter. The left kidney measures 11.5 cm. The cortical thickness within the   left kidney is 19 mm  and 17 mm respectively in the upper and lower   pole. The left kidney is normal in size, shape, contour and position. There is a cyst involving the upper pole the left kidney measuring 1.9   x 1.9 x 2.6 cm in size. The cortical thickness is normal, with   preservation of the corticomedullary differentiation. The central echo   complex is compact with no evidence for hydronephrosis. No   nephrolithiasis or abnormal perinephric fluid collections are seen. No   hydroureter. Scanning through the pelvis demonstrates a Mclain catheter within the   bladder. . The wall thickness and contour cannot be assessed. There is   no surrounding ascites.        Impression   1. Cortical cysts of the kidneys. Otherwise normal renal ultrasound. 2. Mclain catheter in place within the bladder. .   Signed by Dr Georgia Verma on 6/2/2019 10:38 AM         Assessment:     Principal Problem:    Perforated duodenal ulcer ([address]  Active Problems:    Essential hypertension    CKD (chronic kidney disease), baseline stage unknown    JEFF (acute kidney injury) ([address]    Bowel perforation ([address] s/p repair now on [id number]  Resolved Problems:    * No resolved hospital problems. *      Plan:     (ATN?) JEFF on CKD:   D5 in LR at 125cc/h  basline unknown  HOLD ARB  Renal Consult     HTN:   Normotensive without her ARB so will not sub amlodipine etc  Metoprolol will be subbed with 2.5mg IV Q5h in place of tartrate BiD as was tachycardic        Bowel Perforation s/p Sx:  As per primary team - Dr. Butch Liu  Pain meds / Diet / ABx / GI PPx / Antiemetic  (placed on Cefazolin which I concur with, would consider Merum or Flagyl with Cipro if signs of infection present)     DVT PPx: Lovenox SQ QDay from tomorrow as per Sx, I concur but given JEFF  With CrCl 21 would use 30 of lovenox not 40 - will ask pharmacy to dose adjust all meds as indicated     04 JUN 19   POD #3 s/p repair of perforated PUDLaparotomy with [place] patch repair of perforated duodenal   Ulcer, replace VIT D, HB 7.1 transfuse per surgery, CR 1.7 suspect chronic,PT OT up in chair still not passing gas , avoid pain meds NSAIDS abuse , ?  Transfer out of unit per surgery in am d/c NGT today ?     03 JUN 19  POD #2 s/p repair of perforated PUDLaparotomy with [place] patch repair of perforated duodenal  ulcer.  , JEFF 2 to [facility] nephrology on board VIT D deficiency supplemented, HB 7.6 anemia of chronic disease  , daughter at bedside, PT OT up in chair still not passing gas , avoid pain meds NSAIDS abuse

## 2019-06-04 NOTE — PROGRESS NOTES
[address] General Surgery    Progress Note    POD # 3    S: Sitting up in her bedside chair. He reports feeling slightly better. He describes mild abdominal pain area no flatus or bowel movement yet. NG tube was causing significant throat irritation. O:   Vitals:    06/04/19 0400 06/04/19 0500 06/04/19 0600 06/04/19 0650   BP: (!) 132/52 (!) 143/59 (!) 150/68    Pulse: 104 109 108    Resp: 17 25 16 18   Temp: 98.1 °F (36.7 °C)      TempSrc: Temporal      SpO2: 94% 94% 91% 94%   Weight:  161 lb 4.8 oz (73.2 kg)     Height:          I/O last 3 completed shifts: In: 0825 [I.V.:2885; NG/GT:20; IV Piggyback:100]  Out: 5396 [Urine:1130; Emesis/NG output:50; Drains:17]      Lungs clear. Abdomen is rounded but soft. Bowel sounds present. Incision clean and dry. NEHAL with minimal serous output. AM LABS:   CBC:   Recent Labs     06/01/19  1830 06/02/19  0435 06/03/19  0310 06/04/19  0418   WBC 13.4* 15.8* 15.8* 16.1*   RBC 2.99* 2.87* 2.82* 2.69*   HGB 7.8* 7.6* 7.6* 7.1*   HCT 25.3* 24.5* 24.4* 23.0*    339 318 337   BANDSPCT 18* 4 8*  --    LYMPHOPCT 10.0* 8.0* 3.0* 5.5*   MONOPCT 4.0 0.0 1.0 4.9   MYELOPCT 3*  --   --   --    BASOPCT 0.0 0.0 0.0 0.1   MONOSABS 0.50 0.00 0.20 0.80   LYMPHSABS 1.6 1.3 0.5* 0.9*   EOSABS 0.00 0.00 0.16 0.20   BASOSABS 0.00 0.00 0.00 0.00      BMP:   Recent Labs     06/02/19  0435 06/03/19  0310 06/04/19  0418   * 136 139   K 4.3 3.8 3.4*    104 104   CO2 20* 22 24   ANIONGAP 10 10 11   GLUCOSE 135* 119* 108   CREATININE 1.9* 1.8* 1.7*   LABGLOM 25* 27* 28*   CALCIUM 8.0* 8.0* 8.1*     LFT:   Recent Labs     06/03/19  0310   PROT 4.7*   LABALBU 2.5*   BILITOT <0.2   ALKPHOS 50   ALT 6   AST 18       A: 1. Continues with slow but steady recovery post repair of a perforated duodenal ulcer   2. Chronic anemia secondary to GI blood loss from her duodenal ulcer   3. Postoperative ileus as expected, seems to be slowly improving   4.  Confusion with

## 2019-06-04 NOTE — PLAN OF CARE
Nutrition Problem: Inadequate oral intake  Intervention: Food and/or Nutrient Delivery: Continue NPO  Nutritional Goals: New Goal: Nutritional needs will be met via MJ

## 2019-06-04 NOTE — PROGRESS NOTES
Nutrition Assessment    Type and Reason for Visit: Consult    Nutrition Recommendations: Initiate TPN: Recommend Clinimix E 5/20 @ 75 mL/hr. 20% Lipids 250 mL twice weekly. TPN and lipids will provide an average of 1713 kcals and 90 g/PRO daily. MD will need to place orders every 24 hrs. Nutrition Assessment: Pt is declining from a nutritional standpoint AEB NPO status. Pt is at risk for further compromise d/t the need for TPN. WIll make recommendations per consult. Malnutrition Assessment:  · Malnutrition Status: At risk for malnutrition  · Context: Acute illness or injury  · Findings of the 6 clinical characteristics of malnutrition (Minimum of 2 out of 6 clinical characteristics is required to make the diagnosis of moderate or severe Protein Calorie Malnutrition based on AND/ASPEN Guidelines):  1. Energy Intake-Less than or equal to 50% of estimated energy requirement, Unable to assess    2. Weight Loss-No significant weight loss,    3. Fat Loss-No significant subcutaneous fat loss,    4. Muscle Loss-No significant muscle mass loss,    5. Fluid Accumulation-No significant fluid accumulation,    6.   Strength-Not measured    Nutrition Risk Level: High    Nutrient Needs:  · Estimated Daily Total Kcal: 7341-3383 kcals/day  · Estimated Daily Protein (g):  g/PRO/day  · Estimated Daily Total Fluid (ml/day): 3388-0253 mL/day    Nutrition Diagnosis:   · Problem: Inadequate oral intake  · Etiology: related to Acute injury/trauma     Signs and symptoms:  as evidenced by NPO status due to medical condition    Objective Information:  · Current Nutrition Therapies:  · Oral Diet Orders: NPO   · Anthropometric Measures:  · Ht: 5' 4\" (162.6 cm)   · Current Body Wt: 161 lb 5 oz (73.2 kg)  · Ideal Body Wt: 120 lb (54.4 kg)   · BMI Classification: BMI 25.0 - 29.9 Overweight    Nutrition Interventions:   Continue NPO  Continued Inpatient Monitoring    Nutrition Evaluation:   · Evaluation: No progress toward goals   · Goals: New Goal: Nutritional needs will be met via MJ    · Monitoring: Nutrition Progression, PN Intake, PN Tolerance, Weight, Pertinent Labs      Electronically signed by Modesto Carson RD, LOW on 6/4/19 at 12:42 PM    Contact Number: [phone]

## 2019-06-05 LAB
ANION GAP SERPL CALCULATED.3IONS-SCNC: 9 MMOL/L (ref 7–19)
BASOPHILS ABSOLUTE: 0 K/UL (ref 0–0.2)
BASOPHILS RELATIVE PERCENT: 0.2 % (ref 0–1)
BUN BLDV-MCNC: 23 MG/DL (ref 8–23)
CALCIUM SERPL-MCNC: 8.5 MG/DL (ref 8.8–10.2)
CHLORIDE BLD-SCNC: 103 MMOL/L (ref 98–111)
CO2: 25 MMOL/L (ref 22–29)
CREAT SERPL-MCNC: 1.3 MG/DL (ref 0.5–0.9)
EOSINOPHILS ABSOLUTE: 0.1 K/UL (ref 0–0.6)
EOSINOPHILS RELATIVE PERCENT: 1 % (ref 0–5)
GFR NON-AFRICAN AMERICAN: 39
GLUCOSE BLD-MCNC: 147 MG/DL (ref 70–99)
GLUCOSE BLD-MCNC: 160 MG/DL (ref 70–99)
GLUCOSE BLD-MCNC: 192 MG/DL (ref 70–99)
GLUCOSE BLD-MCNC: 217 MG/DL (ref 74–109)
HCT VFR BLD CALC: 25.9 % (ref 37–47)
HEMOGLOBIN: 8.2 G/DL (ref 12–16)
LYMPHOCYTES ABSOLUTE: 0.7 K/UL (ref 1.1–4.5)
LYMPHOCYTES RELATIVE PERCENT: 6 % (ref 20–40)
MAGNESIUM: 1.5 MG/DL (ref 1.6–2.4)
MAGNESIUM: 2 MG/DL (ref 1.6–2.4)
MCH RBC QN AUTO: 27.2 PG (ref 27–31)
MCHC RBC AUTO-ENTMCNC: 31.7 G/DL (ref 33–37)
MCV RBC AUTO: 85.8 FL (ref 81–99)
MONOCYTES ABSOLUTE: 0.8 K/UL (ref 0–0.9)
MONOCYTES RELATIVE PERCENT: 6.6 % (ref 0–10)
NEUTROPHILS ABSOLUTE: 10.1 K/UL (ref 1.5–7.5)
NEUTROPHILS RELATIVE PERCENT: 85 % (ref 50–65)
PDW BLD-RTO: 14.7 % (ref 11.5–14.5)
PERFORMED ON: ABNORMAL
PLATELET # BLD: 296 K/UL (ref 130–400)
PMV BLD AUTO: 8.6 FL (ref 9.4–12.3)
POTASSIUM SERPL-SCNC: 2.9 MMOL/L (ref 3.5–5)
POTASSIUM SERPL-SCNC: 3.8 MMOL/L (ref 3.5–5)
RBC # BLD: 3.02 M/UL (ref 4.2–5.4)
SODIUM BLD-SCNC: 137 MMOL/L (ref 136–145)
WBC # BLD: 11.9 K/UL (ref 4.8–10.8)

## 2019-06-05 PROCEDURE — 99232 SBSQ HOSP IP/OBS MODERATE 35: CPT | Performed by: HOSPITALIST

## 2019-06-05 PROCEDURE — 97116 GAIT TRAINING THERAPY: CPT

## 2019-06-05 PROCEDURE — 6360000002 HC RX W HCPCS: Performed by: HOSPITALIST

## 2019-06-05 PROCEDURE — 97535 SELF CARE MNGMENT TRAINING: CPT

## 2019-06-05 PROCEDURE — 97161 PT EVAL LOW COMPLEX 20 MIN: CPT

## 2019-06-05 PROCEDURE — 2700000000 HC OXYGEN THERAPY PER DAY

## 2019-06-05 PROCEDURE — 2580000003 HC RX 258: Performed by: SURGERY

## 2019-06-05 PROCEDURE — 80048 BASIC METABOLIC PNL TOTAL CA: CPT

## 2019-06-05 PROCEDURE — 2500000003 HC RX 250 WO HCPCS: Performed by: SURGERY

## 2019-06-05 PROCEDURE — 84132 ASSAY OF SERUM POTASSIUM: CPT

## 2019-06-05 PROCEDURE — 6360000002 HC RX W HCPCS: Performed by: INTERNAL MEDICINE

## 2019-06-05 PROCEDURE — 82948 REAGENT STRIP/BLOOD GLUCOSE: CPT

## 2019-06-05 PROCEDURE — 97530 THERAPEUTIC ACTIVITIES: CPT

## 2019-06-05 PROCEDURE — 6370000000 HC RX 637 (ALT 250 FOR IP): Performed by: SURGERY

## 2019-06-05 PROCEDURE — 6360000002 HC RX W HCPCS: Performed by: SURGERY

## 2019-06-05 PROCEDURE — 94640 AIRWAY INHALATION TREATMENT: CPT

## 2019-06-05 PROCEDURE — 2500000003 HC RX 250 WO HCPCS: Performed by: HOSPITALIST

## 2019-06-05 PROCEDURE — C9113 INJ PANTOPRAZOLE SODIUM, VIA: HCPCS | Performed by: SURGERY

## 2019-06-05 PROCEDURE — 97165 OT EVAL LOW COMPLEX 30 MIN: CPT

## 2019-06-05 PROCEDURE — 36592 COLLECT BLOOD FROM PICC: CPT

## 2019-06-05 PROCEDURE — 99024 POSTOP FOLLOW-UP VISIT: CPT | Performed by: SURGERY

## 2019-06-05 PROCEDURE — 83735 ASSAY OF MAGNESIUM: CPT

## 2019-06-05 PROCEDURE — 2000000000 HC ICU R&B

## 2019-06-05 PROCEDURE — 6370000000 HC RX 637 (ALT 250 FOR IP): Performed by: HOSPITALIST

## 2019-06-05 PROCEDURE — 85025 COMPLETE CBC W/AUTO DIFF WBC: CPT

## 2019-06-05 RX ORDER — KETOROLAC TROMETHAMINE 30 MG/ML
15 INJECTION, SOLUTION INTRAMUSCULAR; INTRAVENOUS ONCE
Status: COMPLETED | OUTPATIENT
Start: 2019-06-05 | End: 2019-06-05

## 2019-06-05 RX ORDER — MAGNESIUM SULFATE 1 G/100ML
1 INJECTION INTRAVENOUS PRN
Status: DISCONTINUED | OUTPATIENT
Start: 2019-06-05 | End: 2019-06-12 | Stop reason: HOSPADM

## 2019-06-05 RX ORDER — POTASSIUM CHLORIDE 29.8 MG/ML
20 INJECTION INTRAVENOUS PRN
Status: DISCONTINUED | OUTPATIENT
Start: 2019-06-05 | End: 2019-06-12 | Stop reason: HOSPADM

## 2019-06-05 RX ORDER — POTASSIUM CHLORIDE 7.45 MG/ML
10 INJECTION INTRAVENOUS PRN
Status: DISCONTINUED | OUTPATIENT
Start: 2019-06-05 | End: 2019-06-05

## 2019-06-05 RX ADMIN — INSULIN LISPRO 1 UNITS: 100 INJECTION, SOLUTION INTRAVENOUS; SUBCUTANEOUS at 20:28

## 2019-06-05 RX ADMIN — IRON SUCROSE 100 MG: 20 INJECTION, SOLUTION INTRAVENOUS at 14:51

## 2019-06-05 RX ADMIN — INSULIN LISPRO 1 UNITS: 100 INJECTION, SOLUTION INTRAVENOUS; SUBCUTANEOUS at 18:27

## 2019-06-05 RX ADMIN — METOPROLOL TARTRATE 2.5 MG: 5 INJECTION, SOLUTION INTRAVENOUS at 04:28

## 2019-06-05 RX ADMIN — MORPHINE SULFATE 0.5 MG: 2 INJECTION, SOLUTION INTRAMUSCULAR; INTRAVENOUS at 08:58

## 2019-06-05 RX ADMIN — CEFAZOLIN SODIUM 1 G: 1 INJECTION, SOLUTION INTRAVENOUS at 13:20

## 2019-06-05 RX ADMIN — IPRATROPIUM BROMIDE AND ALBUTEROL SULFATE 1 AMPULE: 2.5; .5 SOLUTION RESPIRATORY (INHALATION) at 10:55

## 2019-06-05 RX ADMIN — MORPHINE SULFATE 0.5 MG: 2 INJECTION, SOLUTION INTRAMUSCULAR; INTRAVENOUS at 20:31

## 2019-06-05 RX ADMIN — METOPROLOL TARTRATE 2.5 MG: 5 INJECTION, SOLUTION INTRAVENOUS at 13:20

## 2019-06-05 RX ADMIN — MORPHINE SULFATE 0.5 MG: 2 INJECTION, SOLUTION INTRAMUSCULAR; INTRAVENOUS at 22:39

## 2019-06-05 RX ADMIN — POTASSIUM CHLORIDE 20 MEQ: 400 INJECTION, SOLUTION INTRAVENOUS at 05:43

## 2019-06-05 RX ADMIN — IPRATROPIUM BROMIDE AND ALBUTEROL SULFATE 1 AMPULE: 2.5; .5 SOLUTION RESPIRATORY (INHALATION) at 18:34

## 2019-06-05 RX ADMIN — PANTOPRAZOLE SODIUM 8 MG/HR: 40 INJECTION, POWDER, FOR SOLUTION INTRAVENOUS at 09:02

## 2019-06-05 RX ADMIN — CEFAZOLIN SODIUM 1 G: 1 INJECTION, SOLUTION INTRAVENOUS at 01:09

## 2019-06-05 RX ADMIN — MORPHINE SULFATE 0.5 MG: 2 INJECTION, SOLUTION INTRAMUSCULAR; INTRAVENOUS at 01:21

## 2019-06-05 RX ADMIN — ASCORBIC ACID, VITAMIN A PALMITATE, CHOLECALCIFEROL, THIAMINE HYDROCHLORIDE, RIBOFLAVIN-5 PHOSPHATE SODIUM, PYRIDOXINE HYDROCHLORIDE, NIACINAMIDE, DEXPANTHENOL, ALPHA-TOCOPHEROL ACETATE, VITAMIN K1, FOLIC ACID, BIOTIN, CYANOCOBALAMIN: 200; 3300; 200; 6; 3.6; 6; 40; 15; 10; 150; 600; 60; 5 INJECTION, SOLUTION INTRAVENOUS at 18:23

## 2019-06-05 RX ADMIN — MAGNESIUM SULFATE HEPTAHYDRATE 1 G: 1 INJECTION, SOLUTION INTRAVENOUS at 07:24

## 2019-06-05 RX ADMIN — MORPHINE SULFATE 0.5 MG: 2 INJECTION, SOLUTION INTRAMUSCULAR; INTRAVENOUS at 10:28

## 2019-06-05 RX ADMIN — MAGNESIUM SULFATE HEPTAHYDRATE 1 G: 1 INJECTION, SOLUTION INTRAVENOUS at 05:43

## 2019-06-05 RX ADMIN — SODIUM CHLORIDE, POTASSIUM CHLORIDE, SODIUM LACTATE AND CALCIUM CHLORIDE: 600; 310; 30; 20 INJECTION, SOLUTION INTRAVENOUS at 19:35

## 2019-06-05 RX ADMIN — MORPHINE SULFATE 0.5 MG: 2 INJECTION, SOLUTION INTRAMUSCULAR; INTRAVENOUS at 16:24

## 2019-06-05 RX ADMIN — I.V. FAT EMULSION 250 ML: 20 EMULSION INTRAVENOUS at 18:30

## 2019-06-05 RX ADMIN — POTASSIUM CHLORIDE 20 MEQ: 400 INJECTION, SOLUTION INTRAVENOUS at 07:46

## 2019-06-05 RX ADMIN — IPRATROPIUM BROMIDE AND ALBUTEROL SULFATE 1 AMPULE: 2.5; .5 SOLUTION RESPIRATORY (INHALATION) at 01:28

## 2019-06-05 RX ADMIN — POTASSIUM CHLORIDE 20 MEQ: 400 INJECTION, SOLUTION INTRAVENOUS at 08:50

## 2019-06-05 RX ADMIN — IPRATROPIUM BROMIDE AND ALBUTEROL SULFATE 1 AMPULE: 2.5; .5 SOLUTION RESPIRATORY (INHALATION) at 14:29

## 2019-06-05 RX ADMIN — KETOROLAC TROMETHAMINE 15 MG: 30 INJECTION, SOLUTION INTRAMUSCULAR; INTRAVENOUS at 21:19

## 2019-06-05 RX ADMIN — MORPHINE SULFATE 0.5 MG: 2 INJECTION, SOLUTION INTRAMUSCULAR; INTRAVENOUS at 14:51

## 2019-06-05 RX ADMIN — IPRATROPIUM BROMIDE AND ALBUTEROL SULFATE 1 AMPULE: 2.5; .5 SOLUTION RESPIRATORY (INHALATION) at 06:55

## 2019-06-05 RX ADMIN — INSULIN LISPRO 1 UNITS: 100 INJECTION, SOLUTION INTRAVENOUS; SUBCUTANEOUS at 14:59

## 2019-06-05 RX ADMIN — MORPHINE SULFATE 0.5 MG: 2 INJECTION, SOLUTION INTRAMUSCULAR; INTRAVENOUS at 19:17

## 2019-06-05 RX ADMIN — ENOXAPARIN SODIUM 30 MG: 30 INJECTION SUBCUTANEOUS at 08:50

## 2019-06-05 RX ADMIN — METOPROLOL TARTRATE 2.5 MG: 5 INJECTION, SOLUTION INTRAVENOUS at 22:39

## 2019-06-05 RX ADMIN — METOPROLOL TARTRATE 2.5 MG: 5 INJECTION, SOLUTION INTRAVENOUS at 18:37

## 2019-06-05 RX ADMIN — MORPHINE SULFATE 0.5 MG: 2 INJECTION, SOLUTION INTRAMUSCULAR; INTRAVENOUS at 13:15

## 2019-06-05 RX ADMIN — PANTOPRAZOLE SODIUM 8 MG/HR: 40 INJECTION, POWDER, FOR SOLUTION INTRAVENOUS at 21:54

## 2019-06-05 ASSESSMENT — PAIN SCALES - GENERAL
PAINLEVEL_OUTOF10: 0
PAINLEVEL_OUTOF10: 9
PAINLEVEL_OUTOF10: 9
PAINLEVEL_OUTOF10: 8
PAINLEVEL_OUTOF10: 10
PAINLEVEL_OUTOF10: 3
PAINLEVEL_OUTOF10: 9
PAINLEVEL_OUTOF10: 8
PAINLEVEL_OUTOF10: 9
PAINLEVEL_OUTOF10: 7
PAINLEVEL_OUTOF10: 7

## 2019-06-05 ASSESSMENT — PAIN DESCRIPTION - LOCATION: LOCATION: LEG;ABDOMEN

## 2019-06-05 ASSESSMENT — PAIN SCALES - WONG BAKER: WONGBAKER_NUMERICALRESPONSE: 8

## 2019-06-05 ASSESSMENT — PAIN DESCRIPTION - PAIN TYPE: TYPE: CHRONIC PAIN;SURGICAL PAIN

## 2019-06-05 ASSESSMENT — PAIN DESCRIPTION - DESCRIPTORS: DESCRIPTORS: CRAMPING

## 2019-06-05 ASSESSMENT — PAIN DESCRIPTION - ORIENTATION: ORIENTATION: RIGHT;LEFT

## 2019-06-05 NOTE — PLAN OF CARE
Nutrition Problem: Inadequate oral intake  Intervention: Food and/or Nutrient Delivery: Continue NPO, Continue Parenteral Nutrition  Nutritional Goals: New Goal: Pt will continue to tolerate TPN.

## 2019-06-05 NOTE — PROGRESS NOTES
Nephrology ([facility]) Progress Note    Patient:  Anshu Saleh  YOB: 1931  Date of Service: 6/5/2019  MRN: [id number]   Acct: [de-identified]   Primary Care Physician: Tito Romero  Advance Directive: Full Code  Admit Date: 6/1/2019       Hospital Day: 4  Referring Provider: Nicholas Vazquez MD    Patient independently seen and examined, Chart, Consults, Notes, Operative notes, Labs, Cardiology, and Radiology studies reviewed as able. Subjective:  Anshu Saleh is a 80 y.o. female  whom we were consulted for acute kidney injury. Patient denies any history of chronic kidney disease. On June 1, she presented to the emergency room with severe abdominal pain. Five days prior to this presentation, she had been complaining of severe sciatica and has taken ibuprofen 400 mg at least 4-5 times a day. For the last 3 days prior to admission, she had been complaining of abdominal pain and the pain was getting worse, so the patient decided to call 911. On arrival to her local hospital, CT scan of the abdomen was done which was consistent with free air in the abdomen. She was transferred to [facility] for emergent laparotomy. Expiratory laparotomy was then done, consistent with perforated duodenal ulcer. Surgery was performed without any complication. Today, no new events per nursing. Denies pain, nausea or vomiting. Patient up in chair. Allergies:  Patient has no known allergies.     Medicines:  Current Facility-Administered Medications   Medication Dose Route Frequency Provider Last Rate Last Dose    magnesium sulfate 1 g in dextrose 5% 100 mL IVPB  1 g Intravenous PRN Evens Graf MD   Stopped at 06/05/19 0924    potassium chloride 20 mEq/50 mL IVPB (Central Line)  20 mEq Intravenous PRN Evens Graf MD 50 mL/hr at 06/05/19 0850 20 mEq at 06/05/19 0850    insulin lispro (HUMALOG) injection vial 0-6 Units  0-6 Units Subcutaneous TID  Samuel Mcgovern MD       [facility] insulin lispro (HUMALOG) injection vial 0-3 Units  0-3 Units Subcutaneous Nightly Mesha Avitia MD        PN-Adult Premix 5/20 - Standard Electrolytes - Central Line   Intravenous Continuous TPN Lyubov Spangler MD        morphine injection 0.5 mg  0.5 mg Intravenous Q30 Min PRN Lyubov Spangler MD   0.5 mg at 06/05/19 1028    PN-Adult Premix 5/20 - Standard Electrolytes - Central Line   Intravenous Continuous TPN Mesah Avitia MD 75 mL/hr at 06/04/19 2100      fat emulsion 20 % infusion 250 mL  250 mL Intravenous Once per day on Mon Thu Mesha Avitia MD        ceFAZolin (ANCEF) 1 g in dextrose 5 % 50 mL IVPB (premix)  1 g Intravenous Q12H Lyubov Spangler MD   Stopped at 06/05/19 1350    ipratropium-albuterol (DUONEB) nebulizer solution 1 ampule  1 ampule Inhalation Q4H WA Lyubov Spangler MD   1 ampule at 06/05/19 1429    lactated ringers infusion   Intravenous Continuous Lyubov Spangler MD 20 mL/hr at 06/05/19 1224      enoxaparin (LOVENOX) injection 30 mg  30 mg Subcutaneous Daily Lyubov Spangler MD   30 mg at 06/05/19 0850    ondansetron (ZOFRAN) injection 4 mg  4 mg Intravenous Q6H PRN Lyubov Spangler MD   4 mg at 06/01/19 1212    sodium chloride flush 0.9 % injection 10 mL  10 mL Intravenous 2 times per day Lyubov Spangler MD   10 mL at 06/04/19 2115    sodium chloride flush 0.9 % injection 10 mL  10 mL Intravenous PRN Lyubov Spangler MD   10 mL at 06/03/19 0004    pantoprazole (PROTONIX) 80 mg in sodium chloride 0.9 % 100 mL infusion  8 mg/hr Intravenous Continuous Lyubov Spangler MD 10 mL/hr at 06/05/19 0902 8 mg/hr at 06/05/19 0902    metoprolol (LOPRESSOR) injection 2.5 mg  2.5 mg Intravenous Q6H Emerald Luis MD   2.5 mg at 06/05/19 1320       Past Medical History:  Past Medical History:   Diagnosis Date    Bowel perforation [facility]), s/p repair on [id number]     CKD (chronic kidney disease), baseline stage unknown     Essential hypertension        Past Surgical History:  Past Surgical History:   Procedure Laterality Date    LAPAROTOMY EXPLORATORY N/A 2019    CENTRAL LINE PLACEMENT LAPAROTOMY EXPLORATORY GRAM PATCH REPAIR OF DUODENAL ULCER performed by Jero Gordillo MD at [facility] OR       Family History  Family History   Problem Relation Age of Onset    No Known Problems Mother          of smoke inhalation in fire    Cancer Father          of cancer of prostate    Other Daughter         sciatica    No Known Problems Daughter     No Known Problems Son     No Known Problems Son         has back surgery       Social History  Social History     Socioeconomic History    Marital status:       Spouse name: Not on file    Number of children: 3    Years of education: Not on file    Highest education level: Not on file   Occupational History    Occupation: retired teacher at [employer]   Social Needs    Financial resource strain: Not on file    Food insecurity:     Worry: Not on file     Inability: Not on file   [employer] needs:     Medical: Not on file     Non-medical: Not on file   Tobacco Use    Smoking status: Never Smoker    Smokeless tobacco: Never Used   Substance and Sexual Activity    Alcohol use: Not Currently    Drug use: Never    Sexual activity: Not on file   Lifestyle    Physical activity:     Days per week: Not on file     Minutes per session: Not on file    Stress: Not on file   Relationships    Social connections:     Talks on phone: Not on file     Gets together: Not on file     Attends Mandaeism service: Not on file     Active member of club or organization: Not on file     Attends meetings of clubs or organizations: Not on file     Relationship status: Not on file    Intimate partner violence:     Fear of current or ex partner: Not on file     Emotionally abused: Not on file     Physically abused: Not on file     Forced sexual activity: Not on file   Other Topics Concern    Not on file   Social History Narrative CODE STATUS: Full Code    HEALTH CARE PROXY: her daughter, Mrs. Marcelo Ames: lives in a private home, no stairs inside home, lives alone, has 1 step in to home    AMBULATES: ambulates independantly         Review of Systems:  History obtained from chart review and the patient  General ROS: No fever or chills  Respiratory ROS: No cough, shortness of breath, wheezing  Cardiovascular ROS: No chest pain or palpitations  Gastrointestinal ROS: No abdominal pain or melena  Genito-Urinary ROS: No dysuria or hematuria  Musculoskeletal ROS: No joint pain or swelling         Objective:  Patient Vitals for the past 24 hrs:   BP Temp Temp src Pulse Resp SpO2   06/05/19 1300 (!) 151/80 -- -- 115 21 93 %   06/05/19 1200 (!) 146/79 98.3 °F (36.8 °C) Temporal 119 19 90 %   06/05/19 1100 (!) 149/82 -- -- 114 21 92 %   06/05/19 1000 (!) 145/72 -- -- 119 18 94 %   06/05/19 0900 131/67 -- -- 112 25 92 %   06/05/19 0800 (!) 148/56 98.2 °F (36.8 °C) Temporal -- -- --   06/05/19 0700 (!) 146/71 -- -- 108 16 96 %   06/05/19 0600 137/63 -- -- 103 22 94 %   06/05/19 0500 (!) 118/59 -- -- 89 15 94 %   06/05/19 0400 131/61 98.4 °F (36.9 °C) Temporal 98 15 95 %   06/05/19 0300 (!) 162/79 -- -- 125 19 92 %   06/05/19 0200 (!) 142/74 -- -- 112 18 95 %   06/05/19 0129 -- -- -- -- 21 92 %   06/05/19 0100 (!) 141/63 -- -- 104 20 94 %   06/05/19 0000 (!) 145/72 97.7 °F (36.5 °C) Temporal 109 22 95 %   06/04/19 2300 (!) 145/67 -- -- 94 22 94 %   06/04/19 2200 (!) 156/79 -- -- 106 16 95 %   06/04/19 2100 (!) 152/74 -- -- 111 17 94 %   06/04/19 2000 (!) 145/73 99.6 °F (37.6 °C) Temporal 99 13 96 %   06/04/19 1900 (!) 148/67 -- -- 108 13 95 %   06/04/19 1840 -- -- -- -- 14 96 %   06/04/19 1700 (!) 145/65 -- -- 112 18 96 %   06/04/19 1600 125/71 98.2 °F (36.8 °C) Temporal 101 13 96 %   06/04/19 1500 -- -- -- 104 15 96 %       Intake/Output Summary (Last 24 hours) at 6/5/2019 1434  Last data filed at 6/5/2019 1200  Gross per 24 hour Intake 3475 ml   Output 1600 ml   Net 1875 ml     General: awake/alert   Chest:  clear to auscultation bilaterally without respiratory distress  CVS: regular rate and rhythm  Abdominal: soft, nontender, normal bowel sounds  Extremities: no cyanosis or edema  Skin: warm and dry without rash    Labs:  BMP:   Recent Labs     06/03/19  0310 06/04/19  0418 06/05/19  0415 06/05/19  1200    139 137  --    K 3.8 3.4* 2.9* 3.8    104 103  --    CO2 22 24 25  --    BUN 23 23 23  --    CREATININE 1.8* 1.7* 1.3*  --    CALCIUM 8.0* 8.1* 8.5*  --      CBC:   Recent Labs     06/03/19 0310 06/04/19 0418 06/05/19  0415   WBC 15.8* 16.1* 11.9*   HGB 7.6* 7.1* 8.2*   HCT 24.4* 23.0* 25.9*   MCV 86.5 85.5 85.8    337 296     LIVER PROFILE:   Recent Labs     06/03/19 0310   AST 18   ALT 6   BILITOT <0.2   ALKPHOS 50     PT/INR: No results for input(s): PROTIME, INR in the last 72 hours. APTT: No results for input(s): APTT in the last 72 hours. BNP:  No results for input(s): BNP in the last 72 hours. Ionized Calcium:No results for input(s): IONCA in the last 72 hours. Magnesium:  Recent Labs     06/05/19 0415 06/05/19  1200   MG 1.5* 2.0     Phosphorus:No results for input(s): PHOS in the last 72 hours. HgbA1C: No results for input(s): LABA1C in the last 72 hours. Hepatic:   Recent Labs     06/03/19 0310   ALKPHOS 50   ALT 6   AST 18   PROT 4.7*   BILITOT <0.2   LABALBU 2.5*     Lactic Acid: No results for input(s): LACTA in the last 72 hours. Troponin: No results for input(s): CKTOTAL, CKMB, TROPONINT in the last 72 hours. ABGs: No results found for: PHART, PO2ART, [id number]  CRP:  No results for input(s): CRP in the last 72 hours. Sed Rate:  No results for input(s): SEDRATE in the last 72 hours. Culture Results:   Blood Culture Recent: No results for input(s): BC in the last 720 hours. Urine Culture Recent : No results for input(s): LABURIN in the last 720 hours.     Radiology reports as per the hypertension with interstitial edema and small effusions. An NG tube has been advanced with the tip in the distal body of the stomach. A right IJ deep line has been placed with no evidence of complications. 1.. NG tube and right IJ deep line placed with no complications. They are well-positioned. 2. Pulmonary venous hypertension with interstitial edema and small effusions.  Signed by Dr Cullen Small on 6/1/2019 10:49 AM       Assessment   Acute kidney injury with unknown baseline  Suspect some chronic kidney disease at baseline  Perforated duodenal ulcer status post exploratory laparotomy  Metabolic acidosis  Acute blood loss anemia with iron deficiency  Vitamin D deficiency  Secondary hyperparathyroidism    Plan:  Discussed with nursing, hospitalist  Add vitamin D when taking by mouth meds  Replace potassium and magnesium IV  Continue TPN  Transfused packed red blood cells as needed  Iron replacement, given transfusion requirements may benefit from IV therapy    Nirali Solomon MD  06/05/19  2:34 PM

## 2019-06-05 NOTE — PROGRESS NOTES
S: Pt. feels better with less abd. pain. Passing flatus with no BM yet. HR still sustaining at 120. Denies any chest pain or SOB. O: /63   Pulse 103   Temp 98.4 °F (36.9 °C) (Temporal)   Resp 22   Ht 5' 4\" (1.626 m)   Wt 161 lb 4.8 oz (73.2 kg)   SpO2 94%   BMI 27.69 kg/m²    Lungs: CTA, Heart: RR with HR at 120, Abd; has occ. BS, soft with mild distension. NEHAL with serosang. output. Lab Results   Component Value Date    WBC 11.9 (H) 06/05/2019    HGB 8.2 (L) 06/05/2019    HCT 25.9 (L) 06/05/2019    MCV 85.8 06/05/2019     06/05/2019     Lab Results   Component Value Date     06/05/2019    K 2.9 (L) 06/05/2019     06/05/2019    CO2 25 06/05/2019    BUN 23 06/05/2019    CREATININE 1.3 (H) 06/05/2019    GLUCOSE 217 (H) 06/05/2019    CALCIUM 8.5 (L) 06/05/2019    PROT 4.7 (L) 06/03/2019    LABALBU 2.5 (L) 06/03/2019    BILITOT <0.2 06/03/2019    ALKPHOS 50 06/03/2019    AST 18 06/03/2019    ALT 6 06/03/2019    LABGLOM 39 (A) 06/05/2019         A: Surgically stable POD #4 from open repair of perforated duodenal ulcer    P: CPT. Electronically signed by Thao Armas PA-C on 6/5/19 at 12:11 PM    I have seen Ms. Renato Muñiz and examined her. I concur with Mr Mendez's note as recorded above. Resting in bed. Appears comfortable. Thirsty. Reports more flatus this morning. Abd soft. Mild tenderness as expected. Active BS. NEHAL with minimal serous output. Continues with slow but satisfactory recovery. Hypokalemia  Renal insuffiencey about resolved    Start clear liquids. D/C curtis  Out of bed and begin ambulating with assist.  Renew TPN  Decrease LR to 20 cc/hr  K+ replacement  Keep in ICU today, to floor tomorrow if continues to do well.     Electronically signed by Nilda Nunez MD on 6/5/2019 at 12:39 PM

## 2019-06-05 NOTE — PROGRESS NOTES
Assessment: Faces  Pain Level: 9  Hopper-Baker Pain Rating: Hurts whole lot  Pain Type: Chronic pain;Surgical pain  Pain Location: Leg;Abdomen  Pain Orientation: Right;Left(Pt reports pain in B thighs; and in surgical area)  Pain Descriptors: Cramping  Non-Pharmaceutical Pain Intervention(s): Relaxation techniques; Other (Comment)(Breathing techniques )  Response to Pain Intervention: Patient Satisfied       Social/Functional History  Social/Functional History  Lives With: Alone  Type of Home: House  Additional Comments: will need to check PLOF with family; pt plan to d/c home with daughter Santo Scott        Objective   Vision: Impaired  Vision Exceptions: Wears glasses at all times  Hearing: Within functional limits    Orientation  Overall Orientation Status: Within Functional Limits  Observation/Palpation  Posture: Fair  Observation: tremulous with activity; slight forward posture during standing, sitting and ambulation   Balance  Sitting Balance: Contact guard assistance  Standing Balance: Contact guard assistance  Standing Balance  Activity: CGA for standing balance at EOB   Functional Mobility  Functional - Mobility Device: Rolling Walker  Assist Level: Contact guard assistance  Functional Mobility Comments: CGA-Min A to ambulate in room and into hallway with cues for upright posture; and need for assist due to management of lines   ADL  Feeding: Independent  Grooming: Independent  UE Bathing: Stand by assistance  LE Bathing: Maximum assistance  UE Dressing: Stand by assistance  LE Dressing: Maximum assistance  Toileting: Contact guard assistance        Bed mobility  Supine to Sit: Moderate assistance;2 Person assistance  Sit to Supine: Moderate assistance;2 Person assistance  Transfers  Sit to stand: Contact guard assistance  Stand to sit: Contact guard assistance     Cognition  Overall Cognitive Status: Exceptions  Arousal/Alertness: Delayed responses to stimuli  Following Commands:  Follows one step commands consistently  Attention Span: Attends with cues to redirect  Memory: Appears intact  Problem Solving: Assistance required to identify errors made;Assistance required to generate solutions  Initiation: Requires cues for some  Sequencing: Requires cues for some  Cognition Comment: pt demosntrated decreased safety during bed mobility with pt wanting to bring hips too close to EOB at risk for sliding off bed; unable to be re-directed to stop scooting to Edge so had to stand         Sensation  Overall Sensation Status: Impaired(pt reports that she has had sciatic pain before in LLE )        LUE AROM (degrees)  LUE AROM : WFL  Left Hand AROM (degrees)  Left Hand AROM: WFL  RUE AROM (degrees)  RUE AROM : WFL  Right Hand AROM (degrees)  Right Hand AROM: WFL  LUE Strength  Gross LUE Strength: WFL  RUE Strength  Gross RUE Strength: WFL        Plan   Plan  Times per week: 3-5x/wk   Current Treatment Recommendations: Functional Mobility Training, Safety Education & Training, Home Management Training, Equipment Evaluation, Education, & procurement, Self-Care / ADL, Patient/Caregiver Education & Training, Balance Training, Strengthening, Endurance Training, ROM       Goals  Short term goals  Time Frame for Short term goals: 1 week  Short term goal 1: Pt will be Modified I for functional mobility to/from bathroom  Short term goal 2: Pt will be Modified I for LE Dressing  Short term goal 3: Pt will be Modified I for toileting task/transfer   Short term goal 4: Pt will be I BUE HEP to increase strength and endurance for daily tasks   Patient Goals   Patient goals : Pt goal to go home with Daughter Carlos Ignacio        Electronically signed by Yuriy López OT on 6/5/2019 at 11:41 AM    Yuriy López OT

## 2019-06-05 NOTE — PROGRESS NOTES
Time In           Time Out           Minutes                   Bo Brenner PT    Electronically signed by Bo Brenner PT on 6/5/2019 at 1:41 PM

## 2019-06-05 NOTE — PLAN OF CARE
Problem: Falls - Risk of:  Goal: Will remain free from falls  Description  Will remain free from falls  Outcome: Met This Shift  Goal: Absence of physical injury  Description  Absence of physical injury  Outcome: Met This Shift     Problem: HEMODYNAMIC STATUS  Goal: Patient has stable vital signs and fluid balance  Outcome: Met This Shift     Problem: SKIN INTEGRITY  Goal: Skin integrity is maintained or improved  Outcome: Met This Shift     Problem: Risk for Impaired Skin Integrity  Goal: Tissue integrity - skin and mucous membranes  Description  Structural intactness and normal physiological function of skin and  mucous membranes. Outcome: Met This Shift     Problem: Pain:  Description  Pain management should include both nonpharmacologic and pharmacologic interventions.   Goal: Pain level will decrease  Description  Pain level will decrease  Outcome: Ongoing  Goal: Control of acute pain  Description  Control of acute pain  Outcome: Ongoing  Goal: Control of chronic pain  Description  Control of chronic pain  Outcome: Ongoing     Problem: Nutrition  Goal: Optimal nutrition therapy  Description  Nutrition Problem: Inadequate oral intake  Intervention: Food and/or Nutrient Delivery: Continue NPO  Nutritional Goals: New Goal: Nutritional needs will be met via MJ        Outcome: Ongoing     Problem: OXYGENATION/RESPIRATORY FUNCTION  Goal: Patient will achieve/maintain normal respiratory rate/effort  Outcome: Ongoing     Problem: MOBILITY  Goal: Early mobilization is achieved  Outcome: Ongoing     Problem: ELIMINATION  Goal: Elimination patterns are normal or improving  Description  Elimination patterns return to pre-surgery normal patterns  Outcome: Ongoing

## 2019-06-05 NOTE — PROGRESS NOTES
Subjective:   Critical Care Daily Progress Note: 6/5/2019 7:48 AM    Interval History:     05 Jun 19   POD #4 s/p repair of perforated PUDLaparotomy with Delona Gunner patch repair of perforated duodenal   Ulcer, NG out started on parenteral nutrition, passing gas  transfused rbc , K 2.9 replace ,Mg 1.5 replace ? Transfer to floor ? Up in chair tid PT OT , goal is home with HH       04 JUN 19   POD #3 s/p repair of perforated PUDLaparotomy with Delona Gunner patch repair of perforated duodenal   Ulcer, replace VIT D, HB 7.1 transfuse per surgery, CR 1.7 suspect chronic,PT OT up in chair still not passing gas , avoid pain meds NSAIDS abuse , ? Transfer out of unit per surgery in am d/c NGT today ? Saintclair Baseman 19  POD #2 s/p repair of perforated PUDLaparotomy with Delona Gunner patch repair of perforated duodenal  ulcer.  , JEFF 2 to [facility] nephrology on board VIT D deficiency supplemented, HB 7.6 anemia of chronic disease  , daughter at bedside, PT OT up in chair still not passing gas , avoid pain meds NSAIDS abuse   [id number]:  Mrs. Mc Su is a pleasant lady feeling better. She had got up with her RN and myself to the chair about noon. She spent about four hours in her chair. She states that her mouth is dry. Devin Och no complaints to open ended questioning. [id number]:  Mrs. Mc Su is a pleasant 80year old  american lady from home last in a hospital when she had delivered her child here many years ago. She came in for pain across her chest. She states that she thought she was having a heart attack as she was unable to eat and had pain across her chest. She also tells us, as she told her surgeon,  that she had the start of sciatica which was sudden and nontraumatic and she began to take up to 4 tabs a day of ibuprofen. She was admitted, after she went to [facility] and was told that they thought that she needed to have abdominal surgery. She has asked them to transfer her to [facility] at that time.  She was accepted by Dr. David Rodriguez at that time for surgery. Review of Systems  She has: fevers, weakness, confusion, hallucinations, neck and back and shoulder pains which she relates to her bed, and a dry mouth. She denies: chills, night sweats, malaise, fatigue, chest pain, dyspnea, diaphoresis, dyspnea, nausea, and near syncope. Scheduled Meds:   fat emulsion  250 mL Intravenous Once per day on Mon Thu    ceFAZolin  1 g Intravenous Q12H    ipratropium-albuterol  1 ampule Inhalation Q4H WA    enoxaparin  30 mg Subcutaneous Daily    sodium chloride flush  10 mL Intravenous 2 times per day    metoprolol  2.5 mg Intravenous Q6H     Continuous Infusions:   PN-Adult Premix 5/20 - Standard Electrolytes - Central Line 75 mL/hr at 06/04/19 2100    lactated ringers 100 mL/hr at 06/04/19 2237    pantoprozole (PROTONIX) infusion 8 mg/hr (06/04/19 1426)     PRN Meds:magnesium sulfate, potassium chloride, morphine, ondansetron, sodium chloride flush        Objective:     I/O last 3 completed shifts: In: 3266.4 [I.V.:2060.2; Blood:381.3; IV Piggyback:150]  Out: 8296 [Urine:1600; Drains:25]  No intake/output data recorded. /63   Pulse 103   Temp 98.4 °F (36.9 °C) (Temporal)   Resp 22   Ht 5' 4\" (1.626 m)   Wt 161 lb 4.8 oz (73.2 kg)   SpO2 94%   BMI 27.69 kg/m²     Physical Exam   Constitutional: She appears well-developed and well-nourished. No distress. up in chair    HENT: NGT out   Head: Normocephalic and atraumatic. Eyes: Right eye exhibits no discharge. Left eye exhibits no discharge. No scleral icterus. Neck: Neck supple. No tracheal deviation present. Cardiovascular: Normal rate and regular rhythm. Exam reveals no gallop and no friction rub. No murmur heard. Pulmonary/Chest: Effort normal. No stridor. No respiratory distress. She has no wheezes. She has no rales. She exhibits no tenderness. Abdominal: She exhibits no distension. There is no rebound and no guarding.  Bowel sounds diminished Abdomen is tender. Musculoskeletal: She exhibits no edema, tenderness or deformity. Neurological: She is alert. Skin: Skin is warm and dry. She is not diaphoretic. Psychiatric: She has a normal mood and affect. Her behavior is normal.   Vital Signs reviewed    LABS:   Results for Ruth Mesa (MRN [id number]) as of 6/2/2019 18:35   Ref.  Range 6/2/2019 04:35 6/2/2019 04:40   Sodium Latest Ref Range: 136 - 145 mmol/L 133 (L)    Potassium Latest Ref Range: 3.5 - 5.0 mmol/L 4.3    Chloride Latest Ref Range: 98 - 111 mmol/L 103    CO2 Latest Ref Range: 22 - 29 mmol/L 20 (L)    BUN Latest Ref Range: 8 - 23 mg/dL 22    Creatinine Latest Ref Range: 0.5 - 0.9 mg/dL 1.9 (H)    Anion Gap Latest Ref Range: 7 - 19 mmol/L 10    GFR Non- Latest Ref Range: >60  25 (A)    Magnesium Latest Ref Range: 1.6 - 2.4 mg/dL 1.3 (L)    Glucose Latest Ref Range: 74 - 109 mg/dL 135 (H)    Calcium Latest Ref Range: 8.8 - 10.2 mg/dL 8.0 (L)    WBC Latest Ref Range: 4.8 - 10.8 K/uL 15.8 (H)    RBC Latest Ref Range: 4.20 - 5.40 M/uL 2.87 (L)    Hemoglobin Quant Latest Ref Range: 12.0 - 16.0 g/dL 7.6 (L)    Hematocrit Latest Ref Range: 37.0 - 47.0 % 24.5 (L)    MCV Latest Ref Range: 81.0 - 99.0 fL 85.4    MCH Latest Ref Range: 27.0 - 31.0 pg 26.5 (L)    MCHC Latest Ref Range: 33.0 - 37.0 g/dL 31.0 (L)    MPV Latest Ref Range: 9.4 - 12.3 fL 8.7 (L)    RDW Latest Ref Range: 11.5 - 14.5 % 14.0    Platelet Count Latest Ref Range: 130 - 400 K/uL 339    Neutrophils % Latest Ref Range: 50.0 - 65.0 % 88.0 (H)    Lymphocyte % Latest Ref Range: 20.0 - 40.0 % 8.0 (L)    Monocytes % Latest Ref Range: 0.0 - 10.0 % 0.0    Eosinophils % Latest Ref Range: 0.0 - 5.0 % 0.0    Basophils % Latest Ref Range: 0.0 - 1.0 % 0.0    Neutrophils # Latest Ref Range: 1.5 - 7.5 K/uL 14.5 (H)    Lymphocytes # Latest Ref Range: 1.1 - 4.5 K/uL 1.3    Monocytes # Latest Ref Range: 0.00 - 0.90 K/uL 0.00    Eosinophils # Latest Ref Range: 0.00 - 0.60 K/uL 0.00 Basophils # Latest Ref Range: 0.00 - 0.20 K/uL 0.00    Bands Relative Latest Ref Range: 0 - 5 % 4    Hypochromia Unknown 1+ (A)    PLATELET SLIDE REVIEW Unknown Adequate    Creatinine, Ur Latest Ref Range: 4.2 - 622.0 mg/dL  94.2   Sodium, Ur Latest Units: mmol/L  21.0     DIAGNOSTICS:  Renal US [id number]:  Narrative   RENAL ULTRASOUND COMPLETE 6/2/2019 8:45 AM   REASON FOR EXAM: Acute kidney injury     COMPARISON: None     TECHNIQUE: Multiple longitudinal and transverse realtime sonographic   images of the kidneys and urinary bladder are obtained. FINDINGS:    The right kidney measures 11.0 cm. The cortical thickness within the   right kidney is 10 mm and 10 mm respectively in the upper and lower   pole.  The right kidney is normal in size, shape, contour and   position. There is an exophytic cyst involving the upper pole of the   right kidney measuring 2.3 x 1.9 x 1.8 cm in size. The cortical   thickness is normal, with preservation of the corticomedullary   differentiation. The central echo complex is compact with no evidence   for hydronephrosis. No nephrolithiasis or abnormal perinephric fluid   collections are seen. No hydroureter. The left kidney measures 11.5 cm. The cortical thickness within the   left kidney is 19 mm  and 17 mm respectively in the upper and lower   pole. The left kidney is normal in size, shape, contour and position. There is a cyst involving the upper pole the left kidney measuring 1.9   x 1.9 x 2.6 cm in size. The cortical thickness is normal, with   preservation of the corticomedullary differentiation. The central echo   complex is compact with no evidence for hydronephrosis. No   nephrolithiasis or abnormal perinephric fluid collections are seen. No   hydroureter. Scanning through the pelvis demonstrates a Mclain catheter within the   bladder. . The wall thickness and contour cannot be assessed. There is   no surrounding ascites.        Impression   1.  Cortical cysts of the disease  , daughter at bedside, PT OT up in chair still not passing gas , avoid pain meds NSAIDS abuse

## 2019-06-05 NOTE — PROGRESS NOTES
Nutrition Assessment    Type and Reason for Visit: Reassess    Nutrition Assessment: Pt is improving from a nutritional standpoint AEB stable wt and tolerating TPN. Will continue to monitor nutrition progression. Malnutrition Assessment:  · Malnutrition Status: At risk for malnutrition  · Context: Acute illness or injury  · Findings of the 6 clinical characteristics of malnutrition (Minimum of 2 out of 6 clinical characteristics is required to make the diagnosis of moderate or severe Protein Calorie Malnutrition based on AND/ASPEN Guidelines):  1. Energy Intake-Less than or equal to 50% of estimated energy requirement, Unable to assess    2. Weight Loss-No significant weight loss,    3. Fat Loss-No significant subcutaneous fat loss,    4. Muscle Loss-No significant muscle mass loss,    5. Fluid Accumulation-No significant fluid accumulation,    6.  Strength-Not measured    Nutrition Risk Level: High    Nutrient Needs:  · Estimated Daily Total Kcal: 1610-7820 kcals/day  · Estimated Daily Protein (g):  g/PRO/day  · Estimated Daily Total Fluid (ml/day): 0948-0674 mL/day    Nutrition Diagnosis:   · Problem: Inadequate oral intake  · Etiology: related to Acute injury/trauma     Signs and symptoms:  as evidenced by Nutrition support - PN    Objective Information:  · Current Nutrition Therapies:  · Oral Diet Orders: NPO     · Anthropometric Measures:  · Ht: 5' 4\" (162.6 cm)   · Current Body Wt: 161 lb 5 oz (73.2 kg)  · Ideal Body Wt: 120 lb (54.4 kg),  · BMI Classification: BMI 25.0 - 29.9 Overweight    Nutrition Interventions:   Continue NPO, Continue Parenteral Nutrition  Continued Inpatient Monitoring    Nutrition Evaluation:   · Evaluation: Goal achieved   · Goals: New Goal: Pt will continue to tolerate TPN.      · Monitoring: Nutrition Progression, PN Intake, PN Tolerance, Weight, Pertinent Labs      Electronically signed by Cole Hector RD, LD on 6/5/19 at 9:45 AM    Contact Number: [phone]

## 2019-06-06 LAB
ANION GAP SERPL CALCULATED.3IONS-SCNC: 9 MMOL/L (ref 7–19)
BASOPHILS ABSOLUTE: 0.1 K/UL (ref 0–0.2)
BASOPHILS RELATIVE PERCENT: 0.3 % (ref 0–1)
BUN BLDV-MCNC: 25 MG/DL (ref 8–23)
CALCIUM SERPL-MCNC: 9 MG/DL (ref 8.8–10.2)
CHLORIDE BLD-SCNC: 105 MMOL/L (ref 98–111)
CO2: 25 MMOL/L (ref 22–29)
CREAT SERPL-MCNC: 1.1 MG/DL (ref 0.5–0.9)
EOSINOPHILS ABSOLUTE: 0.6 K/UL (ref 0–0.6)
EOSINOPHILS RELATIVE PERCENT: 4 % (ref 0–5)
GFR NON-AFRICAN AMERICAN: 47
GLUCOSE BLD-MCNC: 133 MG/DL (ref 70–99)
GLUCOSE BLD-MCNC: 143 MG/DL (ref 70–99)
GLUCOSE BLD-MCNC: 143 MG/DL (ref 74–109)
GLUCOSE BLD-MCNC: 155 MG/DL (ref 70–99)
GLUCOSE BLD-MCNC: 164 MG/DL (ref 70–99)
HCT VFR BLD CALC: 27 % (ref 37–47)
HEMOGLOBIN: 8.4 G/DL (ref 12–16)
LYMPHOCYTES ABSOLUTE: 1.4 K/UL (ref 1.1–4.5)
LYMPHOCYTES RELATIVE PERCENT: 9.7 % (ref 20–40)
MAGNESIUM: 1.7 MG/DL (ref 1.6–2.4)
MCH RBC QN AUTO: 26.9 PG (ref 27–31)
MCHC RBC AUTO-ENTMCNC: 31.1 G/DL (ref 33–37)
MCV RBC AUTO: 86.5 FL (ref 81–99)
MONOCYTES ABSOLUTE: 1.4 K/UL (ref 0–0.9)
MONOCYTES RELATIVE PERCENT: 9.2 % (ref 0–10)
NEUTROPHILS ABSOLUTE: 10.4 K/UL (ref 1.5–7.5)
NEUTROPHILS RELATIVE PERCENT: 70.2 % (ref 50–65)
PDW BLD-RTO: 15 % (ref 11.5–14.5)
PERFORMED ON: ABNORMAL
PLATELET # BLD: 268 K/UL (ref 130–400)
PMV BLD AUTO: 8.5 FL (ref 9.4–12.3)
POTASSIUM SERPL-SCNC: 3.5 MMOL/L (ref 3.5–5)
RBC # BLD: 3.12 M/UL (ref 4.2–5.4)
SODIUM BLD-SCNC: 139 MMOL/L (ref 136–145)
WBC # BLD: 14.8 K/UL (ref 4.8–10.8)

## 2019-06-06 PROCEDURE — 2700000000 HC OXYGEN THERAPY PER DAY

## 2019-06-06 PROCEDURE — 2580000003 HC RX 258: Performed by: SURGERY

## 2019-06-06 PROCEDURE — 6360000002 HC RX W HCPCS: Performed by: SURGERY

## 2019-06-06 PROCEDURE — 2500000003 HC RX 250 WO HCPCS: Performed by: SURGERY

## 2019-06-06 PROCEDURE — 85025 COMPLETE CBC W/AUTO DIFF WBC: CPT

## 2019-06-06 PROCEDURE — 1210000000 HC MED SURG R&B

## 2019-06-06 PROCEDURE — 82948 REAGENT STRIP/BLOOD GLUCOSE: CPT

## 2019-06-06 PROCEDURE — 6370000000 HC RX 637 (ALT 250 FOR IP): Performed by: INTERNAL MEDICINE

## 2019-06-06 PROCEDURE — 51798 US URINE CAPACITY MEASURE: CPT

## 2019-06-06 PROCEDURE — 2500000003 HC RX 250 WO HCPCS: Performed by: HOSPITALIST

## 2019-06-06 PROCEDURE — 80048 BASIC METABOLIC PNL TOTAL CA: CPT

## 2019-06-06 PROCEDURE — 94640 AIRWAY INHALATION TREATMENT: CPT

## 2019-06-06 PROCEDURE — 36592 COLLECT BLOOD FROM PICC: CPT

## 2019-06-06 PROCEDURE — 51701 INSERT BLADDER CATHETER: CPT

## 2019-06-06 PROCEDURE — 83735 ASSAY OF MAGNESIUM: CPT

## 2019-06-06 PROCEDURE — 6360000002 HC RX W HCPCS: Performed by: HOSPITALIST

## 2019-06-06 PROCEDURE — 6370000000 HC RX 637 (ALT 250 FOR IP): Performed by: SURGERY

## 2019-06-06 PROCEDURE — C9113 INJ PANTOPRAZOLE SODIUM, VIA: HCPCS | Performed by: SURGERY

## 2019-06-06 PROCEDURE — 99024 POSTOP FOLLOW-UP VISIT: CPT | Performed by: SURGERY

## 2019-06-06 PROCEDURE — 99232 SBSQ HOSP IP/OBS MODERATE 35: CPT | Performed by: HOSPITALIST

## 2019-06-06 RX ORDER — HYDROCODONE BITARTRATE AND ACETAMINOPHEN 5; 325 MG/1; MG/1
2 TABLET ORAL EVERY 4 HOURS PRN
Status: DISCONTINUED | OUTPATIENT
Start: 2019-06-06 | End: 2019-06-12 | Stop reason: HOSPADM

## 2019-06-06 RX ORDER — HYDROCODONE BITARTRATE AND ACETAMINOPHEN 5; 325 MG/1; MG/1
1 TABLET ORAL EVERY 4 HOURS PRN
Status: DISCONTINUED | OUTPATIENT
Start: 2019-06-06 | End: 2019-06-12 | Stop reason: HOSPADM

## 2019-06-06 RX ORDER — ERGOCALCIFEROL 1.25 MG/1
50000 CAPSULE ORAL WEEKLY
Status: DISCONTINUED | OUTPATIENT
Start: 2019-06-06 | End: 2019-06-12 | Stop reason: HOSPADM

## 2019-06-06 RX ADMIN — HYDROMORPHONE HYDROCHLORIDE 0.25 MG: 1 INJECTION, SOLUTION INTRAMUSCULAR; INTRAVENOUS; SUBCUTANEOUS at 11:11

## 2019-06-06 RX ADMIN — ASCORBIC ACID, VITAMIN A PALMITATE, CHOLECALCIFEROL, THIAMINE HYDROCHLORIDE, RIBOFLAVIN-5 PHOSPHATE SODIUM, PYRIDOXINE HYDROCHLORIDE, NIACINAMIDE, DEXPANTHENOL, ALPHA-TOCOPHEROL ACETATE, VITAMIN K1, FOLIC ACID, BIOTIN, CYANOCOBALAMIN: 200; 3300; 200; 6; 3.6; 6; 40; 15; 10; 150; 600; 60; 5 INJECTION, SOLUTION INTRAVENOUS at 19:52

## 2019-06-06 RX ADMIN — ENOXAPARIN SODIUM 40 MG: 40 INJECTION SUBCUTANEOUS at 08:11

## 2019-06-06 RX ADMIN — HYDROMORPHONE HYDROCHLORIDE 0.25 MG: 1 INJECTION, SOLUTION INTRAMUSCULAR; INTRAVENOUS; SUBCUTANEOUS at 17:07

## 2019-06-06 RX ADMIN — IPRATROPIUM BROMIDE AND ALBUTEROL SULFATE 1 AMPULE: 2.5; .5 SOLUTION RESPIRATORY (INHALATION) at 19:10

## 2019-06-06 RX ADMIN — MORPHINE SULFATE 0.5 MG: 2 INJECTION, SOLUTION INTRAMUSCULAR; INTRAVENOUS at 03:19

## 2019-06-06 RX ADMIN — ERGOCALCIFEROL 50000 UNITS: 1.25 CAPSULE ORAL at 16:38

## 2019-06-06 RX ADMIN — CEFAZOLIN SODIUM 1 G: 1 INJECTION, SOLUTION INTRAVENOUS at 01:14

## 2019-06-06 RX ADMIN — HYDROMORPHONE HYDROCHLORIDE 0.25 MG: 1 INJECTION, SOLUTION INTRAMUSCULAR; INTRAVENOUS; SUBCUTANEOUS at 08:20

## 2019-06-06 RX ADMIN — METOPROLOL TARTRATE 25 MG: 25 TABLET ORAL at 19:52

## 2019-06-06 RX ADMIN — PANTOPRAZOLE SODIUM 8 MG/HR: 40 INJECTION, POWDER, FOR SOLUTION INTRAVENOUS at 21:59

## 2019-06-06 RX ADMIN — METOPROLOL TARTRATE 2.5 MG: 5 INJECTION, SOLUTION INTRAVENOUS at 11:10

## 2019-06-06 RX ADMIN — HYDROCODONE BITARTRATE AND ACETAMINOPHEN 1 TABLET: 5; 325 TABLET ORAL at 19:52

## 2019-06-06 RX ADMIN — POTASSIUM CHLORIDE 20 MEQ: 400 INJECTION, SOLUTION INTRAVENOUS at 08:11

## 2019-06-06 RX ADMIN — Medication 10 ML: at 08:11

## 2019-06-06 RX ADMIN — HYDROMORPHONE HYDROCHLORIDE 0.25 MG: 1 INJECTION, SOLUTION INTRAMUSCULAR; INTRAVENOUS; SUBCUTANEOUS at 14:12

## 2019-06-06 RX ADMIN — MORPHINE SULFATE 0.5 MG: 2 INJECTION, SOLUTION INTRAMUSCULAR; INTRAVENOUS at 12:21

## 2019-06-06 RX ADMIN — IPRATROPIUM BROMIDE AND ALBUTEROL SULFATE 1 AMPULE: 2.5; .5 SOLUTION RESPIRATORY (INHALATION) at 06:53

## 2019-06-06 RX ADMIN — METOPROLOL TARTRATE 25 MG: 25 TABLET ORAL at 13:32

## 2019-06-06 RX ADMIN — HYDROMORPHONE HYDROCHLORIDE 0.25 MG: 1 INJECTION, SOLUTION INTRAMUSCULAR; INTRAVENOUS; SUBCUTANEOUS at 04:05

## 2019-06-06 RX ADMIN — POTASSIUM CHLORIDE 20 MEQ: 400 INJECTION, SOLUTION INTRAVENOUS at 06:23

## 2019-06-06 RX ADMIN — CEFAZOLIN SODIUM 1 G: 1 INJECTION, SOLUTION INTRAVENOUS at 11:28

## 2019-06-06 RX ADMIN — METOPROLOL TARTRATE 2.5 MG: 5 INJECTION, SOLUTION INTRAVENOUS at 05:52

## 2019-06-06 RX ADMIN — IRON SUCROSE 100 MG: 20 INJECTION, SOLUTION INTRAVENOUS at 16:38

## 2019-06-06 RX ADMIN — IPRATROPIUM BROMIDE AND ALBUTEROL SULFATE 1 AMPULE: 2.5; .5 SOLUTION RESPIRATORY (INHALATION) at 11:33

## 2019-06-06 ASSESSMENT — PAIN SCALES - GENERAL
PAINLEVEL_OUTOF10: 9
PAINLEVEL_OUTOF10: 5
PAINLEVEL_OUTOF10: 8
PAINLEVEL_OUTOF10: 9
PAINLEVEL_OUTOF10: 7
PAINLEVEL_OUTOF10: 7
PAINLEVEL_OUTOF10: 0
PAINLEVEL_OUTOF10: 8
PAINLEVEL_OUTOF10: 9
PAINLEVEL_OUTOF10: 10
PAINLEVEL_OUTOF10: 7
PAINLEVEL_OUTOF10: 10
PAINLEVEL_OUTOF10: 0

## 2019-06-06 ASSESSMENT — PAIN DESCRIPTION - PAIN TYPE
TYPE: CHRONIC PAIN
TYPE: CHRONIC PAIN

## 2019-06-06 ASSESSMENT — PAIN DESCRIPTION - ORIENTATION: ORIENTATION: RIGHT;LEFT

## 2019-06-06 ASSESSMENT — PAIN DESCRIPTION - LOCATION
LOCATION: HIP;LEG
LOCATION: HIP;LEG

## 2019-06-06 NOTE — PROGRESS NOTES
Physical Therapy  [facility]  [id number]     06/06/19 9645   Subjective   Subjective Attempt, patient up in chair and states she does not feel up to walking at this time. Nurse states patient just had pain meds and has been up today. Will cont to follow.    Electronically signed by Raudel Pizarro PTA on 6/6/2019 at 9:39 AM

## 2019-06-06 NOTE — PROGRESS NOTES
Pt still unable to void 8 hours after indwelling curtis catheter removed. Pt attempted to void using a bedpan but was unable. Bladder scan performed using PRN order. Bladder scan showed 612mL. In/Out cath placed using PRN order. 625mL of hazy, yasmany urine drained. Post-cath bladder scan showed 37mL. Pt tolerated well.

## 2019-06-06 NOTE — PROGRESS NOTES
Nutrition Assessment    Type and Reason for Visit: Reassess    Nutrition Assessment: Pt continues to improve from a nutritional standpoint AEB upgrade to clear liquid diet and tolerating TPN at goal. Will monitor nutrition progression. Malnutrition Assessment:  · Malnutrition Status: At risk for malnutrition  · Context: Acute illness or injury  · Findings of the 6 clinical characteristics of malnutrition (Minimum of 2 out of 6 clinical characteristics is required to make the diagnosis of moderate or severe Protein Calorie Malnutrition based on AND/ASPEN Guidelines):  1. Energy Intake-Less than or equal to 50% of estimated energy requirement, Unable to assess    2. Weight Loss-No significant weight loss,    3. Fat Loss-No significant subcutaneous fat loss,    4. Muscle Loss-No significant muscle mass loss,    5. Fluid Accumulation-No significant fluid accumulation,    6.  Strength-Not measured    Nutrition Risk Level: High    Nutrient Needs:  · Estimated Daily Total Kcal: 2315-4416 kcals/day  · Estimated Daily Protein (g):  g/PRO/day  · Estimated Daily Total Fluid (ml/day): 0987-2936 mL/day    Nutrition Diagnosis:   · Problem: Inadequate oral intake  · Etiology: related to Acute injury/trauma     Signs and symptoms:  as evidenced by Nutrition support - PN    Objective Information:  · Current Nutrition Therapies:  · Oral Diet Orders: Clear Liquid   · Anthropometric Measures:  · Ht: 5' 4\" (162.6 cm)   · Current Body Wt: 167 lb 5 oz (75.9 kg)  · Ideal Body Wt: 120 lb (54.4 kg),   · BMI Classification: BMI 25.0 - 29.9 Overweight    Nutrition Interventions:   Continue Parenteral Nutrition, Continue current diet  Continued Inpatient Monitoring    Nutrition Evaluation:   · Evaluation: Goal achieved   · Goals: New Goal: Pt will continue to tolerate TPN and PO intake 26% or greater.      · Monitoring: Nutrition Progression, Meal Intake, PN Intake, PN Tolerance, Diet Tolerance, Weight, Pertinent Labs      Electronically signed by Karol Barton RD, LD on 6/6/19 at 1:26 PM    Contact Number: [phone]

## 2019-06-06 NOTE — PROGRESS NOTES
[facility] General Surgery    Progress Note    POD # 5    S: Abdomen is feeling better. Tolerating clear liquids. Main complaint is her back by and knee pain which she has had for many years and which caused her to take the ibuprofen that precipitated her ulcer. Passing flatus but no bowel movement. O:   Vitals:    06/06/19 0825 06/06/19 0900 06/06/19 0905 06/06/19 1000   BP: 137/79 (!) 150/83 (!) 150/83 (!) 155/76   Pulse: 113 116 115 121   Resp: 16 17 16 29   Temp: 97.1 °F (36.2 °C)      TempSrc: Temporal      SpO2: 92% 97% 96% 99%   Weight:       Height:          I/O last 3 completed shifts: In: 3592.3 [P.O.:120; I.V.:1232; IV Piggyback:200]  Out: 6493 [Urine:1682]      Abdomen is soft. Mildly tender as expected. Incision clean and dry. NEHAL with minimal serous output. Bowel sounds present. AM LABS:   CBC:   Recent Labs     06/04/19 0418 06/05/19  0415 06/06/19  0325   WBC 16.1* 11.9* 14.8*   RBC 2.69* 3.02* 3.12*   HGB 7.1* 8.2* 8.4*   HCT 23.0* 25.9* 27.0*    296 268   LYMPHOPCT 5.5* 6.0* 9.7*   MONOPCT 4.9 6.6 9.2   BASOPCT 0.1 0.2 0.3   MONOSABS 0.80 0.80 1.40*   LYMPHSABS 0.9* 0.7* 1.4   EOSABS 0.20 0.10 0.60   BASOSABS 0.00 0.00 0.10      BMP:   Recent Labs     06/04/19  0418 06/05/19  0415 06/05/19  1200 06/06/19  0325    137  --  139   K 3.4* 2.9* 3.8 3.5    103  --  105   CO2 24 25  --  25   ANIONGAP 11 9  --  9   GLUCOSE 108 217*  --  143*   CREATININE 1.7* 1.3*  --  1.1*   LABGLOM 28* 39*  --  47*   CALCIUM 8.1* 8.5*  --  9.0     LFT: No results for input(s): PROT, LABALBU, BILITOT, ALKPHOS, ALT, AST in the last 72 hours. A: 1. Continued slow but steady recovery following repair of a perforated duodenal ulcer   2. Postoperative renal insufficiency, resolved. 3. Postoperative respiratory insufficiency, resolved   4. Advanced age    P: 1. Advanced to full liquid diet   2. Add Tylenol 325 mg by mouth every 6 hours   3.  Okay for transfer to floor, orders placed.

## 2019-06-06 NOTE — CARE COORDINATION
Daughter has chosen [address]. Spoke with [employer]. Referral accepted and faxed. Called PCP, Micheline TRUJILLO, office. Spoke with [employer]. She confirmed PCP will follow and Dr Isabel Villalobos will sign. Referral and Hospital information also faxed to Micheline Hess office. [address] [facility] [address]   [phone]  Fax   [phone]  Micheline TRUJILLO office  [phone]  Fax   [phone]. Please notify [facility] agency when patient discharges and fax DC Summary and med list to both [facility] agency and PCP office as listed above.   Electronically signed by Paula Taveras RN on 6/6/19 at 2:21 PM

## 2019-06-06 NOTE — PROGRESS NOTES
I came on about 1100 to take over this patient. The patient was sitting on the bedside commode when I came into the room. The patient was complaining of pain in the legs and back area. I got the patient back into bed and she was given dilaudid, lopressor and morphine. Her blood pressure remained elevated but I spoke with the oncoming nurse and stated all the medication I had given her in a span of an hour. We both agreed to wait and let the patient relax and check the blood pressure again after settling into the room.

## 2019-06-06 NOTE — PROGRESS NOTES
Recent Labs     06/05/19  0415 06/06/19  0325   BUN 23 25*       Recent Labs     06/05/19  0415 06/06/19  0325   CREATININE 1.3* 1.1*       Estimated Creatinine Clearance: 35 mL/min (A) (based on SCr of 1.1 mg/dL (H)).       Plan: Changed Lovenox back to 40mg Q24hr    Electronically signed by NIKHIL Caruso [facility] on 6/6/2019 at 4:34 AM

## 2019-06-06 NOTE — PROGRESS NOTES
Subjective:   Critical Care Daily Progress Note: 6/6/2019 7:38 AM    Interval History:   06 jun 19  POD #5 s/p repair of perforated PUDLaparotomy with Ethelyn Hutching patch repair of perforated duodenal Ulcer, NGT out , curtis out lytes replaced, ? Transfer to floor today spoke to RN     05 Jun 19   POD #4 s/p repair of perforated PUDLaparotomy with Ethelyn Hutching patch repair of perforated duodenal   Ulcer, NG out started on parenteral nutrition, passing gas  transfused rbc , K 2.9 replace ,Mg 1.5 replace ? Transfer to floor ? Up in chair tid PT OT , goal is home with      04 JUN 19   POD #3 s/p repair of perforated PUDLaparotomy with Ethelyn Hutching patch repair of perforated duodenal   Ulcer, replace VIT D, HB 7.1 transfuse per surgery, CR 1.7 suspect chronic,PT OT up in chair still not passing gas , avoid pain meds NSAIDS abuse , ? Transfer out of unit per surgery in am d/c NGT today ?      03 JUN 19  POD #2 s/p repair of perforated PUDLaparotomy with Ethelyn Hutching patch repair of perforated duodenal  ulcer.  , JEFF 2 to [facility] nephrology on board VIT D deficiency supplemented, HB 7.6 anemia of chronic disease  , daughter at bedside, PT OT up in chair still not passing gas , avoid pain meds NSAIDS abuse   02JUN19:  Mrs. Keyanna Burciaga is a pleasant lady feeling better. She had got up with her RN and myself to the chair about noon. She spent about four hours in her chair. She states that her mouth is dry. Ollis Ards no complaints to open ended questioning. [id number]:  Mrs. Keyanna Burciaga is a pleasant 80year old  american lady from home last in a hospital when she had delivered her child here many years ago. She came in for pain across her chest. She states that she thought she was having a heart attack as she was unable to eat and had pain across her chest. She also tells us, as she told her surgeon,  that she had the start of sciatica which was sudden and nontraumatic and she began to take up to 4 tabs a day of ibuprofen.  She was admitted, after she went to [facility] and was told that they thought that she needed to have abdominal surgery. She has asked them to transfer her to [facility] at that time. She was accepted by Dr. Moreno Powers at that time for surgery. Review of Systems  She has: fevers, weakness, confusion, hallucinations, neck and back and shoulder pains which she relates to her bed, and a dry mouth. She denies: chills, night sweats, malaise, fatigue, chest pain, dyspnea, diaphoresis, dyspnea, nausea, and near syncope. Scheduled Meds:   enoxaparin  40 mg Subcutaneous Daily    insulin lispro  0-6 Units Subcutaneous TID WC    insulin lispro  0-3 Units Subcutaneous Nightly    iron sucrose  100 mg Intravenous Q24H    fat emulsion  250 mL Intravenous Once per day on Mon Thu    ceFAZolin  1 g Intravenous Q12H    ipratropium-albuterol  1 ampule Inhalation Q4H WA    sodium chloride flush  10 mL Intravenous 2 times per day    metoprolol  2.5 mg Intravenous Q6H     Continuous Infusions:   PN-Adult Premix 5/20 - Standard Electrolytes - Central Line 75 mL/hr at 06/05/19 1823    lactated ringers 20 mL/hr at 06/05/19 1935    pantoprozole (PROTONIX) infusion 8 mg/hr (06/05/19 2154)     PRN Meds:HYDROmorphone, magnesium sulfate, potassium chloride, morphine, ondansetron, sodium chloride flush        Objective:     I/O last 3 completed shifts: In: 3592.3 [P.O.:120; I.V.:1232; IV Piggyback:200]  Out: 9673 [Urine:1682]  No intake/output data recorded. BP (!) 152/103   Pulse 107   Temp 99.4 °F (37.4 °C) (Temporal)   Resp 13   Ht 5' 4\" (1.626 m)   Wt (P) 167 lb 5 oz (75.9 kg)   SpO2 90%   BMI (P) 28.72 kg/m²     Physical Exam   Constitutional: She appears well-developed and well-nourished. No distress. up in chair    HENT: NGT out   Head: Normocephalic and atraumatic. Eyes: Right eye exhibits no discharge. Left eye exhibits no discharge. No scleral icterus. Neck: Neck supple. No tracheal deviation present. Cardiovascular: Normal rate and regular rhythm. Exam reveals no gallop and no friction rub. No murmur heard. Pulmonary/Chest: Effort normal. No stridor. No respiratory distress. She has no wheezes. She has no rales. She exhibits no tenderness. Abdominal: She exhibits no distension. There is no rebound and no guarding. Bowel sounds diminished  Abdomen is tender. Musculoskeletal: She exhibits no edema, tenderness or deformity. Neurological: She is alert. Skin: Skin is warm and dry. She is not diaphoretic. Psychiatric: She has a normal mood and affect. Her behavior is normal.   Vital Signs reviewed    LABS:   Results for Pastor Nichols (MRN [id number]) as of 6/2/2019 18:35   Ref.  Range 6/2/2019 04:35 6/2/2019 04:40   Sodium Latest Ref Range: 136 - 145 mmol/L 133 (L)    Potassium Latest Ref Range: 3.5 - 5.0 mmol/L 4.3    Chloride Latest Ref Range: 98 - 111 mmol/L 103    CO2 Latest Ref Range: 22 - 29 mmol/L 20 (L)    BUN Latest Ref Range: 8 - 23 mg/dL 22    Creatinine Latest Ref Range: 0.5 - 0.9 mg/dL 1.9 (H)    Anion Gap Latest Ref Range: 7 - 19 mmol/L 10    GFR Non- Latest Ref Range: >60  25 (A)    Magnesium Latest Ref Range: 1.6 - 2.4 mg/dL 1.3 (L)    Glucose Latest Ref Range: 74 - 109 mg/dL 135 (H)    Calcium Latest Ref Range: 8.8 - 10.2 mg/dL 8.0 (L)    WBC Latest Ref Range: 4.8 - 10.8 K/uL 15.8 (H)    RBC Latest Ref Range: 4.20 - 5.40 M/uL 2.87 (L)    Hemoglobin Quant Latest Ref Range: 12.0 - 16.0 g/dL 7.6 (L)    Hematocrit Latest Ref Range: 37.0 - 47.0 % 24.5 (L)    MCV Latest Ref Range: 81.0 - 99.0 fL 85.4    MCH Latest Ref Range: 27.0 - 31.0 pg 26.5 (L)    MCHC Latest Ref Range: 33.0 - 37.0 g/dL 31.0 (L)    MPV Latest Ref Range: 9.4 - 12.3 fL 8.7 (L)    RDW Latest Ref Range: 11.5 - 14.5 % 14.0    Platelet Count Latest Ref Range: 130 - 400 K/uL 339    Neutrophils % Latest Ref Range: 50.0 - 65.0 % 88.0 (H)    Lymphocyte % Latest Ref Range: 20.0 - 40.0 % 8.0 (L)    Monocytes % Latest Ref Range: 0.0 - 10.0 % 0.0    Eosinophils % Latest Ref Range: 0.0 - 5.0 % 0.0    Basophils % Latest Ref Range: 0.0 - 1.0 % 0.0    Neutrophils # Latest Ref Range: 1.5 - 7.5 K/uL 14.5 (H)    Lymphocytes # Latest Ref Range: 1.1 - 4.5 K/uL 1.3    Monocytes # Latest Ref Range: 0.00 - 0.90 K/uL 0.00    Eosinophils # Latest Ref Range: 0.00 - 0.60 K/uL 0.00    Basophils # Latest Ref Range: 0.00 - 0.20 K/uL 0.00    Bands Relative Latest Ref Range: 0 - 5 % 4    Hypochromia Unknown 1+ (A)    PLATELET SLIDE REVIEW Unknown Adequate    Creatinine, Ur Latest Ref Range: 4.2 - 622.0 mg/dL  94.2   Sodium, Ur Latest Units: mmol/L  21.0     DIAGNOSTICS:  Renal US [id number]:  Narrative   RENAL ULTRASOUND COMPLETE 6/2/2019 8:45 AM   REASON FOR EXAM: Acute kidney injury     COMPARISON: None     TECHNIQUE: Multiple longitudinal and transverse realtime sonographic   images of the kidneys and urinary bladder are obtained. FINDINGS:    The right kidney measures 11.0 cm. The cortical thickness within the   right kidney is 10 mm and 10 mm respectively in the upper and lower   pole.  The right kidney is normal in size, shape, contour and   position. There is an exophytic cyst involving the upper pole of the   right kidney measuring 2.3 x 1.9 x 1.8 cm in size. The cortical   thickness is normal, with preservation of the corticomedullary   differentiation. The central echo complex is compact with no evidence   for hydronephrosis. No nephrolithiasis or abnormal perinephric fluid   collections are seen. No hydroureter. The left kidney measures 11.5 cm. The cortical thickness within the   left kidney is 19 mm  and 17 mm respectively in the upper and lower   pole. The left kidney is normal in size, shape, contour and position. There is a cyst involving the upper pole the left kidney measuring 1.9   x 1.9 x 2.6 cm in size. The cortical thickness is normal, with   preservation of the corticomedullary differentiation.  The central echo   complex is compact with no evidence for hydronephrosis. No   nephrolithiasis or abnormal perinephric fluid collections are seen. No   hydroureter. Scanning through the pelvis demonstrates a Curtis catheter within the   bladder. . The wall thickness and contour cannot be assessed. There is   no surrounding ascites.        Impression   1. Cortical cysts of the kidneys. Otherwise normal renal ultrasound. 2. Curtis catheter in place within the bladder. .   Signed by Dr Patti Schaefer on 6/2/2019 10:38 AM         Assessment:     Principal Problem:    Perforated duodenal ulcer ([facility] [address]  Active Problems:    Essential hypertension    CKD (chronic kidney disease), baseline stage unknown    JEFF (acute kidney injury) ([facility] [address]    Bowel perforation ([facility] [address] s/p repair now on 01JUN19  Resolved Problems:    * No resolved hospital problems. *      Plan:     ([facility]?) JEFF on CKD:   D5 in LR at 125cc/h  basline unknown  HOLD ARB  Renal Consult     HTN:   Normotensive without her ARB so will not sub amlodipine etc  Metoprolol will be subbed with 2.5mg IV Q5h in place of tartrate BiD as was tachycardic        Bowel Perforation s/p Sx:  As per primary team - Dr. Mika Chang  Pain meds / Diet / ABx / GI PPx / Antiemetic  (placed on Cefazolin which I concur with, would consider Merum or Flagyl with Cipro if signs of infection present)     DVT PPx: Lovenox SQ QDay from tomorrow as per Sx, I concur but given JEFF  With CrCl 21 would use 30 of lovenox not 40 - will ask pharmacy to dose adjust all meds as indicated  06 jun 19  POD #5 s/p repair of perforated PUDLaparotomy with Deacon Ric patch repair of perforated duodenal Ulcer, NGT out , curtis out lytes replaced, ? Transfer to floor today spoke to RN   05 Jun 19   POD #4 s/p repair of perforated PUDLaparotomy with Deacon Ric patch repair of perforated duodenal   Ulcer, NG out started on parenteral nutrition, passing gas  transfused rbc , K 2.9 replace ,Mg 1.5 replace ? Transfer to floor ?  Up in chair tid PT OT , goal is home with [facility]      04 JUN 19   POD #3 s/p repair of perforated PUDLaparotomy with Debra Wittenberg patch repair of perforated duodenal   Ulcer, replace VIT D, HB 7.1 transfuse per surgery, CR 1.7 suspect chronic,PT OT up in chair still not passing gas , avoid pain meds NSAIDS abuse , ?  Transfer out of unit per surgery in am d/c NGT today ?     03 JUN 19  POD #2 s/p repair of perforated PUDLaparotomy with Debra Wittenberg patch repair of perforated duodenal  ulcer.  , JEFF 2 to [facility] nephrology on board VIT D deficiency supplemented, HB 7.6 anemia of chronic disease  , daughter at bedside, PT OT up in chair still not passing gas , avoid pain meds NSAIDS abuse

## 2019-06-06 NOTE — PROGRESS NOTES
Physical Therapy  [facility]  [id number]     06/06/19 1339   General   Missed reason Patient declined   Subjective   Subjective Pt refused stating she is worn out from just moving upstairs.     Electronically signed by Osmin Burns PTA on 6/6/2019 at 1:40 PM

## 2019-06-06 NOTE — PROGRESS NOTES
Occupational Therapy  Attempted to see pt in ICU this morning and pt stated that she just wasn't feeling well enough. Attempted again in PM after pt moved to 5th floor and pt refused stating that the move from ICU was very hard on her. Will continue to follow.

## 2019-06-06 NOTE — PROGRESS NOTES
Royal Boswell transferred to [phone] from [facility] via bed. Reason for transfer: step down level of care   Explained reason for transfer to Patient. Belongings: bag of items with patient at bedside . Soft chart transferred with patient: Yes. Telemetry was transferred with patient: yes. Report given to: [facility], via telephone.       Electronically signed by Jae Flores RN on 6/6/2019 at 12:49 PM

## 2019-06-06 NOTE — PLAN OF CARE
Nutrition Problem: Inadequate oral intake  Intervention: Food and/or Nutrient Delivery: Continue Parenteral Nutrition, Continue current diet  Nutritional Goals: New Goal: Pt will continue to tolerate TPN and PO intake 26% or greater.

## 2019-06-06 NOTE — PROGRESS NOTES
Nephrology ([facility]) Progress Note    Patient:  Roberto Agosto  YOB: 1931  Date of Service: 6/6/2019  MRN: [id number]   Acct: [de-identified]   Primary Care Physician: Lorenza Magallanes  Advance Directive: Full Code  Admit Date: 6/1/2019       Hospital Day: 5  Referring Provider: Estee Ontiveros MD    Patient independently seen and examined, Chart, Consults, Notes, Operative notes, Labs, Cardiology, and Radiology studies reviewed as able. Subjective:  Roberto Agosto is a 80 y.o. female  whom we were consulted for acute kidney injury. Patient denies any history of chronic kidney disease. On June 1, she presented to the emergency room with severe abdominal pain. Five days prior to this presentation, she had been complaining of severe sciatica and has taken ibuprofen 400 mg at least 4-5 times a day. For the last 3 days prior to admission, she had been complaining of abdominal pain and the pain was getting worse, so the patient decided to call 911. On arrival to her local hospital, CT scan of the abdomen was done which was consistent with free air in the abdomen. She was transferred to [facility] for emergent laparotomy. Expiratory laparotomy was then done, consistent with perforated duodenal ulcer. Surgery was performed without any complication. Today, no new events per nursing. Denies pain, nausea or vomiting. Planning transfer to the floor. Allergies:  Patient has no known allergies.     Medicines:  Current Facility-Administered Medications   Medication Dose Route Frequency Provider Last Rate Last Dose    HYDROmorphone (DILAUDID) injection 0.25 mg  0.25 mg Intravenous Q3H PRN Estee Ontiveros MD   0.25 mg at 06/06/19 1111    enoxaparin (LOVENOX) injection 40 mg  40 mg Subcutaneous Daily Estee Ontiveros MD   40 mg at 06/06/19 3331    metoprolol tartrate (LOPRESSOR) tablet 25 mg  25 mg Oral BID Estee Ontiveros MD   25 mg at 06/06/19 1332    PN-Adult 8 mg/hr Intravenous Continuous Audra Wyatt MD 10 mL/hr at 19 8 mg/hr at 19       Past Medical History:  Past Medical History:   Diagnosis Date    Bowel perforation [facility]), s/p repair on      CKD (chronic kidney disease), baseline stage unknown     Essential hypertension        Past Surgical History:  Past Surgical History:   Procedure Laterality Date    LAPAROTOMY EXPLORATORY N/A 2019    CENTRAL LINE PLACEMENT LAPAROTOMY EXPLORATORY GRAM PATCH REPAIR OF DUODENAL ULCER performed by Audra Wyatt MD at [facility] OR       Family History  Family History   Problem Relation Age of Onset    No Known Problems Mother          of smoke inhalation in fire    Cancer Father          of cancer of prostate    Other Daughter         sciatica    No Known Problems Daughter     No Known Problems Son     No Known Problems Son         has back surgery       Social History  Social History     Socioeconomic History    Marital status:       Spouse name: Not on file    Number of children: 3    Years of education: Not on file    Highest education level: Not on file   Occupational History    Occupation: retired teacher at [employer]   Social Needs    Financial resource strain: Not on file    Food insecurity:     Worry: Not on file     Inability: Not on file   [place] needs:     Medical: Not on file     Non-medical: Not on file   Tobacco Use    Smoking status: Never Smoker    Smokeless tobacco: Never Used   Substance and Sexual Activity    Alcohol use: Not Currently    Drug use: Never    Sexual activity: Not on file   Lifestyle    Physical activity:     Days per week: Not on file     Minutes per session: Not on file    Stress: Not on file   Relationships    Social connections:     Talks on phone: Not on file     Gets together: Not on file     Attends Anabaptism service: Not on file     Active member of club or organization: Not on file     Attends meetings of clubs or organizations: Not on file     Relationship status: Not on file    Intimate partner violence:     Fear of current or ex partner: Not on file     Emotionally abused: Not on file     Physically abused: Not on file     Forced sexual activity: Not on file   Other Topics Concern    Not on file   Social History Narrative    CODE STATUS: Full Code    HEALTH CARE PROXY: her daughter, Mrs. Migdalia Salinas: lives in a private home, no stairs inside home, lives alone, has 1 step in to home    AMBULATES: ambulates independantly         Review of Systems:  History obtained from chart review and the patient  General ROS: No fever or chills  Respiratory ROS: No cough, shortness of breath, wheezing  Cardiovascular ROS: No chest pain or palpitations  Gastrointestinal ROS: No abdominal pain or melena  Genito-Urinary ROS: No dysuria or hematuria  Musculoskeletal ROS: No joint pain or swelling         Objective:  Patient Vitals for the past 24 hrs:   BP Temp Temp src Pulse Resp SpO2 Weight   06/06/19 1242 (!) 175/93 97.8 °F (36.6 °C) Temporal 118 18 92 % --   06/06/19 1216 (!) 144/124 -- -- 122 20 92 % --   06/06/19 1208 (!) 169/93 -- -- 118 15 92 % --   06/06/19 1204 (!) 176/118 -- -- 122 15 92 % --   06/06/19 1135 (!) 172/89 98.5 °F (36.9 °C) Temporal 101 16 100 % --   06/06/19 1000 (!) 155/76 -- -- 121 29 99 % --   06/06/19 0905 (!) 150/83 -- -- 115 16 96 % --   06/06/19 0900 (!) 150/83 -- -- 116 17 97 % --   06/06/19 0825 137/79 97.1 °F (36.2 °C) Temporal 113 16 92 % --   06/06/19 0700 (!) 160/83 -- -- 113 16 96 % --   06/06/19 0600 (!) 152/103 -- -- 107 13 90 % --   06/06/19 0500 (!) 156/70 -- -- 106 11 94 % --   06/06/19 0400 (!) 135/99 99.4 °F (37.4 °C) Temporal 117 18 92 % --   06/06/19 0300 (!) 144/75 -- -- 101 15 97 % --   06/06/19 0200 (!) 156/118 -- -- 113 20 94 % --   06/06/19 0100 (!) 126/59 -- -- 94 16 95 % --   06/06/19 0000 134/65 99.2 °F (37.3 °C) Temporal 97 12 94 % 167 lb 5 oz (75.9 kg) 06/05/19 2300 (!) 147/79 -- -- 99 16 95 % --   06/05/19 2200 (!) 146/65 -- -- 95 14 93 % --   06/05/19 2134 -- -- -- 104 -- -- --   06/05/19 2100 (!) 162/78 -- -- 106 22 (!) 86 % --   06/05/19 2000 (!) 159/80 99.4 °F (37.4 °C) Temporal 105 19 92 % --   06/05/19 1900 (!) 146/74 -- -- 102 19 92 % --   06/05/19 1834 -- -- -- -- 16 91 % --   06/05/19 1800 (!) 146/80 -- -- 113 20 91 % --   06/05/19 1700 130/76 -- -- 105 15 90 % --   06/05/19 1600 (!) 165/84 98.5 °F (36.9 °C) Temporal 104 19 91 % --   06/05/19 1500 (!) 137/90 -- -- 106 22 92 % --   06/05/19 1400 (!) 160/92 -- -- 103 21 94 % --       Intake/Output Summary (Last 24 hours) at 6/6/2019 1355  Last data filed at 6/6/2019 1135  Gross per 24 hour   Intake 3168.5 ml   Output 1312 ml   Net 1856.5 ml     General: awake/alert   Chest:  clear to auscultation bilaterally without respiratory distress  CVS: regular rate and rhythm  Abdominal: soft, nontender, normal bowel sounds  Extremities: no cyanosis or edema  Skin: warm and dry without rash    Labs:  BMP:   Recent Labs     06/04/19  0418 06/05/19  0415 06/05/19  1200 06/06/19  0325    137  --  139   K 3.4* 2.9* 3.8 3.5    103  --  105   CO2 24 25  --  25   BUN 23 23  --  25*   CREATININE 1.7* 1.3*  --  1.1*   CALCIUM 8.1* 8.5*  --  9.0     CBC:   Recent Labs     06/04/19  0418 06/05/19  0415 06/06/19  0325   WBC 16.1* 11.9* 14.8*   HGB 7.1* 8.2* 8.4*   HCT 23.0* 25.9* 27.0*   MCV 85.5 85.8 86.5    296 268     LIVER PROFILE:   No results for input(s): AST, ALT, LIPASE, BILIDIR, BILITOT, ALKPHOS in the last 72 hours. Invalid input(s): AMYLASE,  ALB  PT/INR: No results for input(s): PROTIME, INR in the last 72 hours. APTT: No results for input(s): APTT in the last 72 hours. BNP:  No results for input(s): BNP in the last 72 hours. Ionized Calcium:No results for input(s): IONCA in the last 72 hours.   Magnesium:  Recent Labs     06/05/19  0415 06/05/19  1200 06/06/19  0957   MG 1.5* 2.0 1.7 size. The cortical thickness is normal, with preservation of the corticomedullary differentiation. The central echo complex is compact with no evidence for hydronephrosis. No nephrolithiasis or abnormal perinephric fluid collections are seen. No hydroureter. Scanning through the pelvis demonstrates a Mclain catheter within the bladder. . The wall thickness and contour cannot be assessed. There is no surrounding ascites. 1. Cortical cysts of the kidneys. Otherwise normal renal ultrasound. 2. Mclain catheter in place within the bladder. . Signed by Dr Nela Lloyd on 6/2/2019 10:38 AM    Xr Chest Portable    Result Date: 6/1/2019  EXAMINATION: Chest one view 6/1/2019 HISTORY: Line placement FINDINGS: Upright frontal projection of the chest demonstrates pulmonary venous hypertension with interstitial edema and small effusions. An NG tube has been advanced with the tip in the distal body of the stomach. A right IJ deep line has been placed with no evidence of complications. 1.. NG tube and right IJ deep line placed with no complications. They are well-positioned. 2. Pulmonary venous hypertension with interstitial edema and small effusions.  Signed by Dr Nela Lloyd on 6/1/2019 10:49 AM       Assessment   Acute kidney injury with unknown baseline  Suspect some chronic kidney disease at baseline  Perforated duodenal ulcer status post exploratory laparotomy  Metabolic acidosis  Acute blood loss anemia with iron deficiency  Vitamin D deficiency  Secondary hyperparathyroidism    Plan:  Discussed with nursing, hospitalist  Add vitamin D as now taking oral meds  Replace potassium and magnesium IV as needed  Continue TPN  Transfused packed red blood cells as needed  Iron replacement, given transfusion requirements may benefit from IV therapy    Margie Hicks MD  06/06/19  1:55 PM

## 2019-06-07 ENCOUNTER — APPOINTMENT (OUTPATIENT)
Dept: GENERAL RADIOLOGY | Age: 84
DRG: 329 | End: 2019-06-07
Attending: SURGERY
Payer: MEDICARE

## 2019-06-07 LAB
ANION GAP SERPL CALCULATED.3IONS-SCNC: 8 MMOL/L (ref 7–19)
BASE EXCESS ARTERIAL: 4.4 MMOL/L (ref -2–2)
BASOPHILS ABSOLUTE: 0 K/UL (ref 0–0.2)
BASOPHILS RELATIVE PERCENT: 0.2 % (ref 0–1)
BUN BLDV-MCNC: 28 MG/DL (ref 8–23)
CALCIUM SERPL-MCNC: 8.8 MG/DL (ref 8.8–10.2)
CARBOXYHEMOGLOBIN ARTERIAL: 1.4 % (ref 0–5)
CHLORIDE BLD-SCNC: 103 MMOL/L (ref 98–111)
CO2: 29 MMOL/L (ref 22–29)
CREAT SERPL-MCNC: 0.9 MG/DL (ref 0.5–0.9)
EOSINOPHILS ABSOLUTE: 0.2 K/UL (ref 0–0.6)
EOSINOPHILS RELATIVE PERCENT: 1.7 % (ref 0–5)
GFR NON-AFRICAN AMERICAN: 59
GLUCOSE BLD-MCNC: 126 MG/DL (ref 70–99)
GLUCOSE BLD-MCNC: 129 MG/DL (ref 74–109)
GLUCOSE BLD-MCNC: 162 MG/DL (ref 70–99)
GLUCOSE BLD-MCNC: 187 MG/DL (ref 70–99)
GLUCOSE BLD-MCNC: 196 MG/DL (ref 70–99)
GLUCOSE BLD-MCNC: 254 MG/DL (ref 70–99)
GLUCOSE BLD-MCNC: 258 MG/DL (ref 70–99)
HCO3 ARTERIAL: 29.2 MMOL/L (ref 22–26)
HCT VFR BLD CALC: 25.1 % (ref 37–47)
HEMOGLOBIN, ART, EXTENDED: 8.6 G/DL (ref 12–16)
HEMOGLOBIN: 7.7 G/DL (ref 12–16)
LYMPHOCYTES ABSOLUTE: 0.6 K/UL (ref 1.1–4.5)
LYMPHOCYTES RELATIVE PERCENT: 4.5 % (ref 20–40)
MAGNESIUM: 1.6 MG/DL (ref 1.6–2.4)
MCH RBC QN AUTO: 26.2 PG (ref 27–31)
MCHC RBC AUTO-ENTMCNC: 30.7 G/DL (ref 33–37)
MCV RBC AUTO: 85.4 FL (ref 81–99)
METHEMOGLOBIN ARTERIAL: 1 %
MONOCYTES ABSOLUTE: 0.6 K/UL (ref 0–0.9)
MONOCYTES RELATIVE PERCENT: 4.4 % (ref 0–10)
NEUTROPHILS ABSOLUTE: 11.2 K/UL (ref 1.5–7.5)
NEUTROPHILS RELATIVE PERCENT: 81.6 % (ref 50–65)
O2 CONTENT ARTERIAL: 11.6 ML/DL
O2 SAT, ARTERIAL: 94.8 %
O2 THERAPY: ABNORMAL
PCO2 ARTERIAL: 44 MMHG (ref 35–45)
PDW BLD-RTO: 15.3 % (ref 11.5–14.5)
PERFORMED ON: ABNORMAL
PH ARTERIAL: 7.43 (ref 7.35–7.45)
PLATELET # BLD: 287 K/UL (ref 130–400)
PMV BLD AUTO: 9.2 FL (ref 9.4–12.3)
PO2 ARTERIAL: 74 MMHG (ref 80–100)
POTASSIUM SERPL-SCNC: 3.3 MMOL/L (ref 3.5–5)
POTASSIUM, WHOLE BLOOD: 3.2
RBC # BLD: 2.94 M/UL (ref 4.2–5.4)
SODIUM BLD-SCNC: 140 MMOL/L (ref 136–145)
WBC # BLD: 13.7 K/UL (ref 4.8–10.8)

## 2019-06-07 PROCEDURE — 1210000000 HC MED SURG R&B

## 2019-06-07 PROCEDURE — 94761 N-INVAS EAR/PLS OXIMETRY MLT: CPT

## 2019-06-07 PROCEDURE — 99024 POSTOP FOLLOW-UP VISIT: CPT | Performed by: SURGERY

## 2019-06-07 PROCEDURE — 36600 WITHDRAWAL OF ARTERIAL BLOOD: CPT

## 2019-06-07 PROCEDURE — 80048 BASIC METABOLIC PNL TOTAL CA: CPT

## 2019-06-07 PROCEDURE — 6370000000 HC RX 637 (ALT 250 FOR IP): Performed by: SURGERY

## 2019-06-07 PROCEDURE — 97116 GAIT TRAINING THERAPY: CPT

## 2019-06-07 PROCEDURE — 2500000003 HC RX 250 WO HCPCS: Performed by: SURGERY

## 2019-06-07 PROCEDURE — 82948 REAGENT STRIP/BLOOD GLUCOSE: CPT

## 2019-06-07 PROCEDURE — 82803 BLOOD GASES ANY COMBINATION: CPT

## 2019-06-07 PROCEDURE — 97535 SELF CARE MNGMENT TRAINING: CPT

## 2019-06-07 PROCEDURE — 2700000000 HC OXYGEN THERAPY PER DAY

## 2019-06-07 PROCEDURE — 6360000002 HC RX W HCPCS: Performed by: SURGERY

## 2019-06-07 PROCEDURE — 99232 SBSQ HOSP IP/OBS MODERATE 35: CPT | Performed by: FAMILY MEDICINE

## 2019-06-07 PROCEDURE — 92610 EVALUATE SWALLOWING FUNCTION: CPT

## 2019-06-07 PROCEDURE — 94640 AIRWAY INHALATION TREATMENT: CPT

## 2019-06-07 PROCEDURE — 71045 X-RAY EXAM CHEST 1 VIEW: CPT

## 2019-06-07 PROCEDURE — 6360000002 HC RX W HCPCS: Performed by: HOSPITALIST

## 2019-06-07 PROCEDURE — C9113 INJ PANTOPRAZOLE SODIUM, VIA: HCPCS | Performed by: SURGERY

## 2019-06-07 PROCEDURE — 83735 ASSAY OF MAGNESIUM: CPT

## 2019-06-07 PROCEDURE — 84132 ASSAY OF SERUM POTASSIUM: CPT

## 2019-06-07 PROCEDURE — 85025 COMPLETE CBC W/AUTO DIFF WBC: CPT

## 2019-06-07 PROCEDURE — 2580000003 HC RX 258: Performed by: SURGERY

## 2019-06-07 PROCEDURE — 97530 THERAPEUTIC ACTIVITIES: CPT

## 2019-06-07 RX ORDER — METHYLPREDNISOLONE SODIUM SUCCINATE 125 MG/2ML
125 INJECTION, POWDER, LYOPHILIZED, FOR SOLUTION INTRAMUSCULAR; INTRAVENOUS ONCE
Status: COMPLETED | OUTPATIENT
Start: 2019-06-07 | End: 2019-06-07

## 2019-06-07 RX ORDER — LORAZEPAM 2 MG/ML
1 INJECTION INTRAMUSCULAR ONCE
Status: COMPLETED | OUTPATIENT
Start: 2019-06-07 | End: 2019-06-07

## 2019-06-07 RX ORDER — FUROSEMIDE 10 MG/ML
40 INJECTION INTRAMUSCULAR; INTRAVENOUS ONCE
Status: COMPLETED | OUTPATIENT
Start: 2019-06-07 | End: 2019-06-07

## 2019-06-07 RX ORDER — CEFAZOLIN SODIUM 1 G/50ML
1 INJECTION, SOLUTION INTRAVENOUS EVERY 8 HOURS
Status: DISCONTINUED | OUTPATIENT
Start: 2019-06-07 | End: 2019-06-10

## 2019-06-07 RX ORDER — METOPROLOL TARTRATE 5 MG/5ML
5 INJECTION INTRAVENOUS ONCE
Status: COMPLETED | OUTPATIENT
Start: 2019-06-07 | End: 2019-06-07

## 2019-06-07 RX ORDER — LORAZEPAM 2 MG/ML
INJECTION INTRAMUSCULAR
Status: DISCONTINUED
Start: 2019-06-07 | End: 2019-06-07

## 2019-06-07 RX ORDER — ACETAMINOPHEN 325 MG/1
325 TABLET ORAL 4 TIMES DAILY
Status: DISCONTINUED | OUTPATIENT
Start: 2019-06-07 | End: 2019-06-12 | Stop reason: HOSPADM

## 2019-06-07 RX ADMIN — CEFAZOLIN SODIUM 1 G: 1 INJECTION, SOLUTION INTRAVENOUS at 01:31

## 2019-06-07 RX ADMIN — CEFAZOLIN SODIUM 1 G: 1 INJECTION, SOLUTION INTRAVENOUS at 09:22

## 2019-06-07 RX ADMIN — INSULIN LISPRO 1 UNITS: 100 INJECTION, SOLUTION INTRAVENOUS; SUBCUTANEOUS at 17:35

## 2019-06-07 RX ADMIN — IPRATROPIUM BROMIDE AND ALBUTEROL SULFATE 1 AMPULE: 2.5; .5 SOLUTION RESPIRATORY (INHALATION) at 07:25

## 2019-06-07 RX ADMIN — LORAZEPAM 1 MG: 2 INJECTION INTRAMUSCULAR; INTRAVENOUS at 00:34

## 2019-06-07 RX ADMIN — Medication 10 ML: at 21:47

## 2019-06-07 RX ADMIN — IPRATROPIUM BROMIDE AND ALBUTEROL SULFATE 1 AMPULE: 2.5; .5 SOLUTION RESPIRATORY (INHALATION) at 15:43

## 2019-06-07 RX ADMIN — HYDROCODONE BITARTRATE AND ACETAMINOPHEN 1 TABLET: 5; 325 TABLET ORAL at 21:59

## 2019-06-07 RX ADMIN — ACETAMINOPHEN 325 MG: 325 TABLET ORAL at 13:07

## 2019-06-07 RX ADMIN — METOPROLOL TARTRATE 25 MG: 25 TABLET ORAL at 09:21

## 2019-06-07 RX ADMIN — ACETAMINOPHEN 325 MG: 325 TABLET ORAL at 21:47

## 2019-06-07 RX ADMIN — IPRATROPIUM BROMIDE AND ALBUTEROL SULFATE 1 AMPULE: 2.5; .5 SOLUTION RESPIRATORY (INHALATION) at 20:20

## 2019-06-07 RX ADMIN — IRON SUCROSE 100 MG: 20 INJECTION, SOLUTION INTRAVENOUS at 16:01

## 2019-06-07 RX ADMIN — IPRATROPIUM BROMIDE AND ALBUTEROL SULFATE 1 AMPULE: 2.5; .5 SOLUTION RESPIRATORY (INHALATION) at 11:45

## 2019-06-07 RX ADMIN — INSULIN LISPRO 1 UNITS: 100 INJECTION, SOLUTION INTRAVENOUS; SUBCUTANEOUS at 21:47

## 2019-06-07 RX ADMIN — METHYLPREDNISOLONE SODIUM SUCCINATE 125 MG: 125 INJECTION, POWDER, FOR SOLUTION INTRAMUSCULAR; INTRAVENOUS at 00:31

## 2019-06-07 RX ADMIN — CEFAZOLIN SODIUM 1 G: 1 INJECTION, SOLUTION INTRAVENOUS at 17:35

## 2019-06-07 RX ADMIN — ACETAMINOPHEN 325 MG: 325 TABLET ORAL at 17:35

## 2019-06-07 RX ADMIN — IPRATROPIUM BROMIDE AND ALBUTEROL SULFATE 1 AMPULE: 2.5; .5 SOLUTION RESPIRATORY (INHALATION) at 00:12

## 2019-06-07 RX ADMIN — METOPROLOL TARTRATE 25 MG: 25 TABLET ORAL at 21:47

## 2019-06-07 RX ADMIN — FUROSEMIDE 40 MG: 10 INJECTION, SOLUTION INTRAMUSCULAR; INTRAVENOUS at 00:31

## 2019-06-07 RX ADMIN — ENOXAPARIN SODIUM 40 MG: 40 INJECTION SUBCUTANEOUS at 09:22

## 2019-06-07 RX ADMIN — ASCORBIC ACID, VITAMIN A PALMITATE, CHOLECALCIFEROL, THIAMINE HYDROCHLORIDE, RIBOFLAVIN-5 PHOSPHATE SODIUM, PYRIDOXINE HYDROCHLORIDE, NIACINAMIDE, DEXPANTHENOL, ALPHA-TOCOPHEROL ACETATE, VITAMIN K1, FOLIC ACID, BIOTIN, CYANOCOBALAMIN: 200; 3300; 200; 6; 3.6; 6; 40; 15; 10; 150; 600; 60; 5 INJECTION, SOLUTION INTRAVENOUS at 18:08

## 2019-06-07 RX ADMIN — INSULIN LISPRO 3 UNITS: 100 INJECTION, SOLUTION INTRAVENOUS; SUBCUTANEOUS at 13:08

## 2019-06-07 RX ADMIN — PANTOPRAZOLE SODIUM 8 MG/HR: 40 INJECTION, POWDER, FOR SOLUTION INTRAVENOUS at 18:07

## 2019-06-07 RX ADMIN — METOPROLOL TARTRATE 5 MG: 5 INJECTION, SOLUTION INTRAVENOUS at 00:31

## 2019-06-07 ASSESSMENT — PAIN SCALES - GENERAL
PAINLEVEL_OUTOF10: 0
PAINLEVEL_OUTOF10: 0
PAINLEVEL_OUTOF10: 7
PAINLEVEL_OUTOF10: 0
PAINLEVEL_OUTOF10: 5
PAINLEVEL_OUTOF10: 8
PAINLEVEL_OUTOF10: 5

## 2019-06-07 NOTE — PROGRESS NOTES
Nephrology ([address]) Progress Note    Patient:  Gray Arias  YOB: 1931  Date of Service: 6/7/2019  MRN: [id number]   Acct: [de-identified]   Primary Care Physician: Silke Apple  Advance Directive: Full Code  Admit Date: 6/1/2019       Hospital Day: 6  Referring Provider: Gabbi Quiroz MD    Patient independently seen and examined, Chart, Consults, Notes, Operative notes, Labs, Cardiology, and Radiology studies reviewed as able. Subjective:  Gray Arias is a 80 y.o. female  whom we were consulted for acute kidney injury. Patient denies any history of chronic kidney disease. On June 1, she presented to the emergency room with severe abdominal pain. Five days prior to this presentation, she had been complaining of severe sciatica and has taken ibuprofen 400 mg at least 4-5 times a day. For the last 3 days prior to admission, she had been complaining of abdominal pain and the pain was getting worse, so the patient decided to call 911. On arrival to her local hospital, CT scan of the abdomen was done which was consistent with free air in the abdomen. She was transferred to [facility] for emergent laparotomy. Expiratory laparotomy was then done, consistent with perforated duodenal ulcer. Surgery was performed without any complication. Today, no new events noted. Denies pain, nausea or vomiting. No shortness of air or chest pain. Allergies:  Patient has no known allergies.     Medicines:  Current Facility-Administered Medications   Medication Dose Route Frequency Provider Last Rate Last Dose    LORazepam (ATIVAN) 2 MG/ML injection             ceFAZolin (ANCEF) 1 g in dextrose 5 % 50 mL IVPB (premix)  1 g Intravenous Q8H Gabbi Quiroz MD   Stopped at 06/07/19 1003    PN-Adult Premix 5/20 - Standard Electrolytes - Central Line   Intravenous Continuous TPN Gabbi Quiroz MD        HYDROmorphone (DILAUDID) injection 0.25 mg  0.25 mg Intravenous Q3H PRN Chris Clark MD   0.25 mg at 06/06/19 1707    enoxaparin (LOVENOX) injection 40 mg  40 mg Subcutaneous Daily Chris Clark MD   40 mg at 06/07/19 1042    metoprolol tartrate (LOPRESSOR) tablet 25 mg  25 mg Oral BID Chris Clark MD   25 mg at 06/07/19 8014    PN-Adult Premix 5/20 - Standard Electrolytes - Central Line   Intravenous Continuous TPN Chris Clark MD 75 mL/hr at 06/06/19 1952      vitamin D (ERGOCALCIFEROL) capsule 50,000 Units  50,000 Units Oral Weekly Tristian Sanders MD   50,000 Units at 06/06/19 1638    HYDROcodone-acetaminophen (NORCO) 5-325 MG per tablet 1 tablet  1 tablet Oral Q4H PRN Ed Dubon MD   1 tablet at 06/06/19 1952    Or    HYDROcodone-acetaminophen (NORCO) 5-325 MG per tablet 2 tablet  2 tablet Oral Q4H PRN Ed Dubon MD        magnesium sulfate 1 g in dextrose 5% 100 mL IVPB  1 g Intravenous PRN Chris Clark MD   Stopped at 06/05/19 0924    potassium chloride 20 mEq/50 mL IVPB (Central Line)  20 mEq Intravenous PRN Chris Clark MD 50 mL/hr at 06/06/19 0811 20 mEq at 06/06/19 0811    insulin lispro (HUMALOG) injection vial 0-6 Units  0-6 Units Subcutaneous TID WC Chris Clark MD   1 Units at 06/05/19 1827    insulin lispro (HUMALOG) injection vial 0-3 Units  0-3 Units Subcutaneous Nightly Chris Clark MD   1 Units at 06/05/19 2028    iron sucrose (VENOFER) injection 100 mg  100 mg Intravenous Q24H Chris Clark MD   100 mg at 06/06/19 1638    morphine injection 0.5 mg  0.5 mg Intravenous Q30 Min PRN Chris Clark MD   0.5 mg at 06/06/19 1221    fat emulsion 20 % infusion 250 mL  250 mL Intravenous Once per day on Mon Thu Chris Clark MD   Stopped at 06/06/19 9232    ipratropium-albuterol (DUONEB) nebulizer solution 1 ampule  1 ampule Inhalation Q4H WA Chris Clark MD   1 ampule at 06/07/19 1145    ondansetron (ZOFRAN) injection 4 mg  4 mg Intravenous Q6H PRN Chris Clark MD   4 mg at 06/01/19 1212    sodium chloride flush 0.9 % injection 10 mL  10 mL Intravenous 2 times per day Emeli Garcia MD   10 mL at 19 0811    sodium chloride flush 0.9 % injection 10 mL  10 mL Intravenous PRN Emeli Garcia MD   10 mL at 19 0004    pantoprazole (PROTONIX) 80 mg in sodium chloride 0.9 % 100 mL infusion  8 mg/hr Intravenous Continuous Emeli Garcia MD 10 mL/hr at 19 8 mg/hr at 19       Past Medical History:  Past Medical History:   Diagnosis Date    Bowel perforation [facility]), s/p repair on      CKD (chronic kidney disease), baseline stage unknown     Essential hypertension        Past Surgical History:  Past Surgical History:   Procedure Laterality Date    LAPAROTOMY EXPLORATORY N/A 2019    CENTRAL LINE PLACEMENT LAPAROTOMY EXPLORATORY GRAM PATCH REPAIR OF DUODENAL ULCER performed by Emeli Garcia MD at [facility] OR       Family History  Family History   Problem Relation Age of Onset    No Known Problems Mother          of smoke inhalation in fire    Cancer Father          of cancer of prostate    Other Daughter         sciatica    No Known Problems Daughter     No Known Problems Son     No Known Problems Son         has back surgery       Social History  Social History     Socioeconomic History    Marital status:       Spouse name: Not on file    Number of children: 3    Years of education: Not on file    Highest education level: Not on file   Occupational History    Occupation: retired teacher at [employer]   Social Needs    Financial resource strain: Not on file    Food insecurity:     Worry: Not on file     Inability: Not on file   [employer] needs:     Medical: Not on file     Non-medical: Not on file   Tobacco Use    Smoking status: Never Smoker    Smokeless tobacco: Never Used   Substance and Sexual Activity    Alcohol use: Not Currently    Drug use: Never    Sexual activity: Not on file   Lifestyle    Physical activity:     Days per week: Not on file     Minutes per session: Not on file    Stress: Not on file   Relationships    Social connections:     Talks on phone: Not on file     Gets together: Not on file     Attends Anabaptism service: Not on file     Active member of club or organization: Not on file     Attends meetings of clubs or organizations: Not on file     Relationship status: Not on file    Intimate partner violence:     Fear of current or ex partner: Not on file     Emotionally abused: Not on file     Physically abused: Not on file     Forced sexual activity: Not on file   Other Topics Concern    Not on file   Social History Narrative    CODE STATUS: Full Code    HEALTH CARE PROXY: her daughter, Mrs. Hailey Otero: lives in a private home, no stairs inside home, lives alone, has 1 step in to home    AMBULATES: ambulates independantly         Review of Systems:  History obtained from chart review and the patient  General ROS: No fever or chills  Respiratory ROS: No cough, shortness of breath, wheezing  Cardiovascular ROS: No chest pain or palpitations  Gastrointestinal ROS: No abdominal pain or melena  Genito-Urinary ROS: No dysuria or hematuria  Musculoskeletal ROS: No joint pain or swelling         Objective:  Patient Vitals for the past 24 hrs:   BP Temp Temp src Pulse Resp SpO2   06/07/19 1020 130/69 98.4 °F (36.9 °C) Temporal 92 16 95 %   06/07/19 0731 -- -- -- -- -- 94 %   06/07/19 0728 (!) 148/78 98.2 °F (36.8 °C) Temporal 106 16 95 %   06/07/19 0122 118/64 99.2 °F (37.3 °C) Temporal 100 20 93 %   06/07/19 0009 (!) 200/104 -- -- 124 -- --   06/06/19 2155 (!) 140/68 97.2 °F (36.2 °C) Temporal 99 20 92 %   06/06/19 1834 (!) 167/90 99.8 °F (37.7 °C) Temporal 112 18 91 %   06/06/19 1651 (!) 164/87 97.8 °F (36.6 °C) Temporal 106 16 90 %   06/06/19 1242 (!) 175/93 97.8 °F (36.6 °C) Temporal 118 18 92 %   06/06/19 1216 (!) 144/124 -- -- 122 20 92 %   06/06/19 1208 (!) 169/93 -- -- 118 15 92 % 06/06/19 1204 (!) 176/118 -- -- 122 15 92 %       Intake/Output Summary (Last 24 hours) at 6/7/2019 1202  Last data filed at 6/7/2019 1020  Gross per 24 hour   Intake 0 ml   Output 5200 ml   Net -5200 ml     General: awake/alert   Chest:  clear to auscultation bilaterally without respiratory distress  CVS: regular rate and rhythm  Abdominal: soft, nontender, normal bowel sounds  Extremities: no cyanosis or edema  Skin: warm and dry without rash    Labs:  BMP:   Recent Labs     06/05/19  0415  06/06/19  0325 06/07/19  0119 06/07/19  0200     --  139  --  140   K 2.9*   < > 3.5 3.2 3.3*     --  105  --  103   CO2 25  --  25  --  29   BUN 23  --  25*  --  28*   CREATININE 1.3*  --  1.1*  --  0.9   CALCIUM 8.5*  --  9.0  --  8.8    < > = values in this interval not displayed. CBC:   Recent Labs     06/05/19  0415 06/06/19  0325 06/07/19  0200   WBC 11.9* 14.8* 13.7*   HGB 8.2* 8.4* 7.7*   HCT 25.9* 27.0* 25.1*   MCV 85.8 86.5 85.4    268 287     LIVER PROFILE:   No results for input(s): AST, ALT, LIPASE, BILIDIR, BILITOT, ALKPHOS in the last 72 hours. Invalid input(s): AMYLASE,  ALB  PT/INR: No results for input(s): PROTIME, INR in the last 72 hours. APTT: No results for input(s): APTT in the last 72 hours. BNP:  No results for input(s): BNP in the last 72 hours. Ionized Calcium:No results for input(s): IONCA in the last 72 hours. Magnesium:  Recent Labs     06/05/19  1200 06/06/19  0957 06/07/19  0200   MG 2.0 1.7 1.6     Phosphorus:No results for input(s): PHOS in the last 72 hours. HgbA1C: No results for input(s): LABA1C in the last 72 hours. Hepatic:   No results for input(s): ALKPHOS, ALT, AST, PROT, BILITOT, BILIDIR, LABALBU in the last 72 hours. Lactic Acid: No results for input(s): LACTA in the last 72 hours. Troponin: No results for input(s): CKTOTAL, CKMB, TROPONINT in the last 72 hours.   ABGs:   Lab Results   Component Value Date    PHART 7.430 06/07/2019    PO2ART 74.0 06/07/2019    JSD9YRC 44.0 06/07/2019     CRP:  No results for input(s): CRP in the last 72 hours. Sed Rate:  No results for input(s): SEDRATE in the last 72 hours. Culture Results:   Blood Culture Recent: No results for input(s): BC in the last 720 hours. Urine Culture Recent : No results for input(s): LABURIN in the last 720 hours. Radiology reports as per the Radiologist  Radiology: Us Renal Complete    Result Date: 6/2/2019  RENAL ULTRASOUND COMPLETE 6/2/2019 8:45 AM REASON FOR EXAM: Acute kidney injury  COMPARISON: None  TECHNIQUE: Multiple longitudinal and transverse realtime sonographic images of the kidneys and urinary bladder are obtained. FINDINGS: The right kidney measures 11.0 cm. The cortical thickness within the right kidney is 10 mm and 10 mm respectively in the upper and lower pole. The right kidney is normal in size, shape, contour and position. There is an exophytic cyst involving the upper pole of the right kidney measuring 2.3 x 1.9 x 1.8 cm in size. The cortical thickness is normal, with preservation of the corticomedullary differentiation. The central echo complex is compact with no evidence for hydronephrosis. No nephrolithiasis or abnormal perinephric fluid collections are seen. No hydroureter. The left kidney measures 11.5 cm. The cortical thickness within the left kidney is 19 mm  and 17 mm respectively in the upper and lower pole. The left kidney is normal in size, shape, contour and position. There is a cyst involving the upper pole the left kidney measuring 1.9 x 1.9 x 2.6 cm in size. The cortical thickness is normal, with preservation of the corticomedullary differentiation. The central echo complex is compact with no evidence for hydronephrosis. No nephrolithiasis or abnormal perinephric fluid collections are seen. No hydroureter. Scanning through the pelvis demonstrates a Mclain catheter within the bladder. . The wall thickness and contour cannot be assessed.  There is no surrounding ascites. 1. Cortical cysts of the kidneys. Otherwise normal renal ultrasound. 2. Mclain catheter in place within the bladder. . Signed by Dr Maurice Cr on 6/2/2019 10:38 AM    Xr Chest Portable    Result Date: 6/1/2019  EXAMINATION: Chest one view 6/1/2019 HISTORY: Line placement FINDINGS: Upright frontal projection of the chest demonstrates pulmonary venous hypertension with interstitial edema and small effusions. An NG tube has been advanced with the tip in the distal body of the stomach. A right IJ deep line has been placed with no evidence of complications. 1.. NG tube and right IJ deep line placed with no complications. They are well-positioned. 2. Pulmonary venous hypertension with interstitial edema and small effusions.  Signed by Dr Maurice Cr on 6/1/2019 10:49 AM       Assessment   Acute kidney injury with unknown baseline  Suspect some chronic kidney disease at baseline  Perforated duodenal ulcer status post exploratory laparotomy  Metabolic acidosis  Acute blood loss anemia with iron deficiency  Vitamin D deficiency  Secondary hyperparathyroidism    Plan:  Added vitamin D as now taking oral meds  Replace potassium and magnesium IV as needed  Continue TPN  Transfused packed red blood cells as needed  Iron replacement, given transfusion requirements may benefit from IV therapy    Richie Jones MD  06/07/19  12:02 PM

## 2019-06-07 NOTE — PROGRESS NOTES
Physical Therapy    Name: Tracie Grier  MRN:  [id number]  Date of service:  6/7/2019    TREATMENT TODAY    AMBULATION   Distance:  5' x 2     Device:    [] No device [x] Rolling Walker [] Walker [] [employer] [] Hand Held     Assistance: 1+1 for safety    [] Independent [] Modified Independent [] Stand by / Supervision   [] Minimum  [x] Moderate   [] Maximum  [] CGA    TRANSFERS   Bed to chair   [] Independent [] Modified Independent [] Stand by / Supervision   [] Minimum  [x] Moderate x2   [] Maximum      Bed mobility   Supine to sit   [] Independent [] Modified Independent [] Stand by / Supervision   [] Minimum  [x] Moderate   [] Maximum        Roll  [] Left  [] Right    [] Independent [] Modified Independent [] Stand by / Supervision   [] Minimum  [] Moderate   [] Maximum      Scoot  [] Side to side  [] Up and down   [] Independent [] Modified Independent [] Stand by / Supervision   [] Minimum  [x] Moderate   [] Maximum       Comment: Assisted patient to and from the bedside commode. Assisted with cleanup. Lots of rest breaks throughout therapy due to fatigue and decreased SpO2. Left patient in recliner with all needs in reach, set up to eat lunch. Assisted patient with eating some of her lunch as well (patient very shaky).       Electronically signed by Carrington Jaimes PTA on 6/7/2019 at 2:09 PM

## 2019-06-07 NOTE — PLAN OF CARE
Nutrition Problem: Inadequate oral intake  Intervention: Food and/or Nutrient Delivery: Continue Parenteral Nutrition, Continue current diet  Nutritional Goals: New Goal: Pt will continue to tolerate TPN and PO intake of 50% or greater.

## 2019-06-07 NOTE — PROGRESS NOTES
Recent Labs     06/06/19  0325 06/07/19  0200   BUN 25* 28*       Recent Labs     06/06/19  0325 06/07/19  0200   CREATININE 1.1* 0.9       Estimated Creatinine Clearance: 44 mL/min (based on SCr of 0.9 mg/dL). Plan: Changed Ancef back to 1gm Q8hr.     Electronically signed by NIKHIL Caruso [facility] on 6/7/2019 at 3:32 AM

## 2019-06-07 NOTE — PROGRESS NOTES
[address] General Surgery    Progress Note    POD # 7    S: Had some shortness of breath and tachycardia overnight. Responded nicely to diuresis and feeling better now. Reports that she's tolerating full liquids but doesn't have much appetite. Continues to pass flatus but no bowel movement. O:   Vitals:    06/07/19 0122 06/07/19 0728 06/07/19 0731 06/07/19 1020   BP: 118/64 (!) 148/78  130/69   Pulse: 100 106  92   Resp: 20 16  16   Temp: 99.2 °F (37.3 °C) 98.2 °F (36.8 °C)  98.4 °F (36.9 °C)   TempSrc: Temporal Temporal  Temporal   SpO2: 93% 95% 94% 95%   Weight:       Height:          I/O last 3 completed shifts: In: 786.3 [P.O.:50; I.V.:167.5; IV Piggyback:150]  Out: 6583 [Urine:4600; Drains:15]      Lungs are clear at the apices. Slight rales at the bases. Abdomen is rounded, soft and continues to be mildly tender as expected. Bowel sounds are improved. Incision clean and dry. NEHAL with minimal serous output. AM LABS:   CBC:   Recent Labs     06/05/19 0415 06/06/19  0325 06/07/19  0200   WBC 11.9* 14.8* 13.7*   RBC 3.02* 3.12* 2.94*   HGB 8.2* 8.4* 7.7*   HCT 25.9* 27.0* 25.1*    268 287   LYMPHOPCT 6.0* 9.7* 4.5*   MONOPCT 6.6 9.2 4.4   BASOPCT 0.2 0.3 0.2   MONOSABS 0.80 1.40* 0.60   LYMPHSABS 0.7* 1.4 0.6*   EOSABS 0.10 0.60 0.20   BASOSABS 0.00 0.10 0.00      BMP:   Recent Labs     06/05/19  0415  06/06/19  0325 06/07/19  0119 06/07/19  0200     --  139  --  140   K 2.9*   < > 3.5 3.2 3.3*     --  105  --  103   CO2 25  --  25  --  29   ANIONGAP 9  --  9  --  8   GLUCOSE 217*  --  143*  --  129*   CREATININE 1.3*  --  1.1*  --  0.9   LABGLOM 39*  --  47*  --  59*   CALCIUM 8.5*  --  9.0  --  8.8    < > = values in this interval not displayed. A: 1. Continued slow recovery following repair of a perforated duodenal ulcer. 2. Mild fluid overload, seems improved post Lasix administration. 3. Acute renal insufficiency on chronic kidney disease, resolved   4. Advanced age. P: 1. Continue TPN until her PO intake is better   2. PT and OT consults for possible SNF placement. 3. DC the IV fluid going at 20 mL per hour   4. I've encouraged her to work with the disc: Occupational therapists in order regaining her strength and stamina. 5. Dr. Julienne Peacock covering the weekend.

## 2019-06-07 NOTE — PROGRESS NOTES
Occupational Therapy  Facility/Department: [facility] SURG SERVICES  Daily Treatment Note  NAME: [place]  : 11/3/1931  MRN: [id number]    Date of Service: 2019    Discharge Recommendations:  Patient would benefit from continued therapy after discharge       Assessment   Performance deficits / Impairments: Decreased functional mobility ; Decreased ADL status; Decreased high-level IADLs;Decreased strength;Decreased coordination  Assessment: Pt able to get up to chair for lunch. Pt benefits from skilled OT to address decreased pt I to complete functional mobility, balance, endurance, and ADL Tasks s/p exploratory laparotomy   Treatment Diagnosis: Exploratory laparotomy   Prognosis: Good  REQUIRES OT FOLLOW UP: Yes  Activity Tolerance  Activity Tolerance: Patient Tolerated treatment well         Patient Diagnosis(es): There were no encounter diagnoses. has a past medical history of Bowel perforation ([facility] [address] s/p repair on , CKD (chronic kidney disease), baseline stage unknown, and Essential hypertension. has a past surgical history that includes LAPAROTOMY EXPLORATORY (N/A, 2019).     Restrictions  Restrictions/Precautions  Restrictions/Precautions: Fall Risk  Position Activity Restriction  Other position/activity restrictions: pt super anxious throughout session with changes in BP at this time   Subjective   General  Chart Reviewed: Yes  Patient assessed for rehabilitation services?: Yes  Response to previous treatment: Patient with no complaints from previous session  Family / Caregiver Present: No  Referring Practitioner: Dr. Matt Bueno  Diagnosis: Exploratory Laparotomy   Subjective  Subjective: Pt demonstrating anxious behavior throughout session; nurse Vitaly Harper able to give pain medication but pt has no anti anxiety medication ordered  General Comment  Comments: Pt goes by the name \"Salo\"-- a nickname from her    Vital Signs  Patient Currently in Pain: No   Orientation     Objective ADL  Toileting: Moderate assistance        Balance  Sitting Balance: Stand by assistance  Standing Balance: Contact guard assistance  Toilet Transfers  Toilet - Technique: Stand step  Equipment Used: Standard bedside commode  Toilet Transfer:  Moderate assistance;Minimal assistance  Bed mobility  Supine to Sit: Moderate assistance;2 Person assistance  Transfers  Stand Step Transfers: Minimal assistance;2 Person assistance  Sit to stand: Minimal assistance  Stand to sit: Minimal assistance                                                                 Plan   Plan  Times per week: 3-5x/wk   Current Treatment Recommendations: Functional Mobility Training, Safety Education & Training, Home Management Training, Equipment Evaluation, Education, & procurement, Self-Care / ADL, Patient/Caregiver Education & Training, Balance Training, Strengthening, Endurance Training, ROM  G-Code     OutComes Score                                                  AM-PAC Score             Goals  Short term goals  Time Frame for Short term goals: 1 week  Short term goal 1: Pt will be Modified I for functional mobility to/from bathroom  Short term goal 2: Pt will be Modified I for LE Dressing  Short term goal 3: Pt will be Modified I for toileting task/transfer   Short term goal 4: Pt will be I BUE HEP to increase strength and endurance for daily tasks   Patient Goals   Patient goals : Pt goal to go home with Daughter Jorge L Lutz Time   Individual Concurrent Group Co-treatment   Time In           Time Out           Minutes                   JENNI Merchant

## 2019-06-07 NOTE — CARE COORDINATION
Pt has now been listed with facilities close to family since home with home health is not the best option right now. SW spoke with Pt daughter Gary Diaz and she asked for Pt to be listed with [facility] short term rehab facility.  Awaiting approval/denial.  [facility]    T   F  Electronically signed by Izabela Ford on 6/7/2019 at 2:38 PM

## 2019-06-07 NOTE — PROGRESS NOTES
Hospitalist Progress Note  6/7/2019 2:44 PM  Subjective:   Admit Date: 6/1/2019  PCP: Faye Yoon    Chief Complaint: abdominal pain    Subjective: Patient is feeling much better, states her pain is controlled. Desaturation alarm going off prior to examining her and afterwards without good waveform seen. Supplemental oxygen turned up to 5 LPM and SaO2 recovered to mid 90%s with restoration of waveform, but dropped again to low 70%s thereafter. Afebrile, willing to work with PT and OT towards discharge. History is otherwise unchanged. ROS: 14 point review of systems is negative except as specifically addressed above. PN-Adult Premix 5/20 - Standard Electrolytes - Central Line  PN-Adult Premix 5/20 - Standard Electrolytes - Central Line  DIET CLEAR LIQUID;     Intake/Output Summary (Last 24 hours) at 6/7/2019 1444  Last data filed at 6/7/2019 1020  Gross per 24 hour   Intake 0 ml   Output 5200 ml   Net -5200 ml     Medications:   PN-Adult Premix 5/20 - Standard Electrolytes - Central Line      PN-Adult Premix 5/20 - Standard Electrolytes - Central Line 75 mL/hr at 06/06/19 1952    pantoprozole (PROTONIX) infusion 8 mg/hr (06/06/19 2159)     Current Facility-Administered Medications   Medication Dose Route Frequency Provider Last Rate Last Dose    ceFAZolin (ANCEF) 1 g in dextrose 5 % 50 mL IVPB (premix)  1 g Intravenous Q8H Benito Zuniga MD   Stopped at 06/07/19 1003    PN-Adult Premix 5/20 - Standard Electrolytes - Central Line   Intravenous Continuous TPN Benito Zuniga MD        acetaminophen (TYLENOL) tablet 325 mg  325 mg Oral 4x Daily Benito Zuniga MD   325 mg at 06/07/19 1307    HYDROmorphone (DILAUDID) injection 0.25 mg  0.25 mg Intravenous Q3H PRN Benito Zuniga MD   0.25 mg at 06/06/19 1707    enoxaparin (LOVENOX) injection 40 mg  40 mg Subcutaneous Daily Benito Zuniga MD   40 mg at 06/07/19 3981    metoprolol tartrate (LOPRESSOR) tablet 25 mg  25 mg Oral BID Jann King Naomi Luna MD   25 mg at 06/07/19 8229    PN-Adult Premix 5/20 - Standard Electrolytes - Central Line   Intravenous Continuous TPN Melisa Mcgovern MD 75 mL/hr at 06/06/19 1952      vitamin D (ERGOCALCIFEROL) capsule 50,000 Units  50,000 Units Oral Weekly Radha Cedillo MD   50,000 Units at 06/06/19 1638    HYDROcodone-acetaminophen (NORCO) 5-325 MG per tablet 1 tablet  1 tablet Oral Q4H PRN Zohra Sinclair MD   1 tablet at 06/06/19 1952    Or    HYDROcodone-acetaminophen (NORCO) 5-325 MG per tablet 2 tablet  2 tablet Oral Q4H PRN Zohra Sinclair MD        magnesium sulfate 1 g in dextrose 5% 100 mL IVPB  1 g Intravenous PRN Melisa Mcgovern MD   Stopped at 06/05/19 0924    potassium chloride 20 mEq/50 mL IVPB (Central Line)  20 mEq Intravenous PRN Melisa Mcgovern MD 50 mL/hr at 06/06/19 0811 20 mEq at 06/06/19 0811    insulin lispro (HUMALOG) injection vial 0-6 Units  0-6 Units Subcutaneous TID WC Melisa Mcgovern MD   3 Units at 06/07/19 1308    insulin lispro (HUMALOG) injection vial 0-3 Units  0-3 Units Subcutaneous Nightly Melisa Mcgovern MD   1 Units at 06/05/19 2028    iron sucrose (VENOFER) injection 100 mg  100 mg Intravenous Q24H Melisa Mcgovern MD   100 mg at 06/06/19 1638    morphine injection 0.5 mg  0.5 mg Intravenous Q30 Min PRCHARISSE Mcgovern MD   0.5 mg at 06/06/19 1221    fat emulsion 20 % infusion 250 mL  250 mL Intravenous Once per day on Mon Thu Melisa Mcgovern MD   Stopped at 06/06/19 6358    ipratropium-albuterol (DUONEB) nebulizer solution 1 ampule  1 ampule Inhalation Q4H WA Melisa Mcgovern MD   1 ampule at 06/07/19 1145    ondansetron (ZOFRAN) injection 4 mg  4 mg Intravenous Q6H PRCHARISSE Mcgovern MD   4 mg at 06/01/19 1212    sodium chloride flush 0.9 % injection 10 mL  10 mL Intravenous 2 times per day Melisa Mcgovern MD   10 mL at 06/06/19 0811    sodium chloride flush 0.9 % injection 10 mL  10 mL Intravenous PRN Melisa Mcgovern MD   10 mL at 06/03/19 0004    pantoprazole (PROTONIX) 80 mg in sodium chloride 0.9 % 100 mL infusion  8 mg/hr Intravenous Continuous Ayala Greenfield MD 10 mL/hr at 06/06/19 2159 8 mg/hr at 06/06/19 2159        Labs:     Recent Labs     06/05/19  0415 06/06/19  0325 06/07/19  0200   WBC 11.9* 14.8* 13.7*   RBC 3.02* 3.12* 2.94*   HGB 8.2* 8.4* 7.7*   HCT 25.9* 27.0* 25.1*   MCV 85.8 86.5 85.4   MCH 27.2 26.9* 26.2*   MCHC 31.7* 31.1* 30.7*    268 287     Recent Labs     06/05/19  0415  06/06/19  0325 06/07/19  0119 06/07/19  0200     --  139  --  140   K 2.9*   < > 3.5 3.2 3.3*   ANIONGAP 9  --  9  --  8     --  105  --  103   CO2 25  --  25  --  29   BUN 23  --  25*  --  28*   CREATININE 1.3*  --  1.1*  --  0.9   GLUCOSE 217*  --  143*  --  129*   CALCIUM 8.5*  --  9.0  --  8.8    < > = values in this interval not displayed. Recent Labs     06/05/19  1200 06/06/19  0957 06/07/19  0200   MG 2.0 1.7 1.6     No results for input(s): AST, ALT, ALB, BILITOT, ALKPHOS, ALB in the last 72 hours. ABGs:No results for input(s): PH, PO2, PCO2, HCO3, BE, O2SAT in the last 72 hours. Troponin T: No results for input(s): TROPONINI in the last 72 hours. INR: No results for input(s): INR in the last 72 hours. Lactic Acid: No results for input(s): LACTA in the last 72 hours.     Objective:   Vitals: /66   Pulse 108   Temp 99 °F (37.2 °C) (Temporal)   Resp 16   Ht 5' 4\" (1.626 m)   Wt 167 lb 5 oz (75.9 kg)   SpO2 94%   BMI 28.72 kg/m²   24HR INTAKE/OUTPUT:      Intake/Output Summary (Last 24 hours) at 6/7/2019 1444  Last data filed at 6/7/2019 1020  Gross per 24 hour   Intake 0 ml   Output 5200 ml   Net -5200 ml     General appearance: alert and cooperative with exam  HEENT: atraumatic, eyes with clear conjunctiva and normal lids, pupils and irises normal, external ears and nose are normal, lips normal  Neck without masses, lympadenopathy, bruit, thyroid normal  Lungs: no increased work of breathing,

## 2019-06-07 NOTE — PROGRESS NOTES
Pt started showing respiratory issues at midnight, her o2 sat was 81 on 3L o2 nasal canula,  Therapy was called pt is now on 50%venti mask o2 is better. Dr. Sin Hernandez was notified and orders were obtained. Chest xray, neb treatment, lopressor for bp that was 200/104 heart rate 124. Dr. Sin Hernandez also consulted hospitalist for medical management. Dr. Tushar Samano ordered all fluids on hold, lasix, solumedrol, ativan and stat abg's. A curtis catheter was placed do to fluid retention. she has so far  voided 1500cc. Pt is now resting o2 sat.  At 92% on venti mask,

## 2019-06-07 NOTE — PROGRESS NOTES
Ref Range & Units 06/07/19 0119   pH, Arterial 7.350 - 7.450 7.430    pCO2, Arterial 35.0 - 45.0 mmHg 44.0    pO2, Arterial 80.0 - 100.0 mmHg 74. 0Low     HCO3, Arterial 22.0 - 26.0 mmol/L 29.2High     Base Excess, Arterial -2.0 - 2.0 mmol/L 4.4High     Hemoglobin, Art, Extended 12.0 - 16.0 g/dL 8.6Low     O2 Sat, Arterial >92 % 94.8    Carboxyhgb, Arterial 0.0 - 5.0 % 1.4    Comment:      0.0-1.5   (Smokers 1.5-5.0)    Methemoglobin, Arterial <1.5 % 1.0    O2 Content, Arterial Not Established mL/dL 11.6    O2 Therapy  Unknown    Resulting Agency  [facility]        Specimen Collected: 06/07/19 0119 Last Resulted: 06/07/19 0120 View Other Order Details        50% VENTI-MASK  LEFT RADIAL AT+

## 2019-06-07 NOTE — PROGRESS NOTES
Nutrition Assessment    Type and Reason for Visit: Reassess    Nutrition Assessment: Pt continues to tolerate diet and TPN. Will update preferences and continue to monitor. Malnutrition Assessment:  · Malnutrition Status: At risk for malnutrition  · Context: Acute illness or injury  · Findings of the 6 clinical characteristics of malnutrition (Minimum of 2 out of 6 clinical characteristics is required to make the diagnosis of moderate or severe Protein Calorie Malnutrition based on AND/ASPEN Guidelines):  1. Energy Intake-Less than or equal to 50% of estimated energy requirement, Unable to assess    2. Weight Loss-No significant weight loss,    3. Fat Loss-No significant subcutaneous fat loss,    4. Muscle Loss-No significant muscle mass loss,    5. Fluid Accumulation-No significant fluid accumulation,    6.  Strength-Not measured    Nutrition Risk Level: High    Nutrient Needs:  · Estimated Daily Total Kcal: 6160-8308 kcals/day  · Estimated Daily Protein (g):  g/PRO/day  · Estimated Daily Total Fluid (ml/day): 9649-9375 mL/day    Nutrition Diagnosis:   · Problem: Inadequate oral intake  · Etiology: related to Acute injury/trauma     Signs and symptoms:  as evidenced by Intake 0-25%, Nutrition support - PN    Objective Information:  · Current Nutrition Therapies:  · Oral Diet Orders: Clear Liquid   · Oral Diet intake: 1-25%  · Anthropometric Measures:  · Ht: 5' 4\" (162.6 cm)   · Current Body Wt: 167 lb 5 oz (75.9 kg)  · Ideal Body Wt: 120 lb (54.4 kg),   · BMI Classification: BMI 25.0 - 29.9 Overweight    Nutrition Interventions:   Continue Parenteral Nutrition, Continue current diet  Continued Inpatient Monitoring    Nutrition Evaluation:   · Evaluation: Goal achieved   · Goals: New Goal: Pt will continue to tolerate TPN and PO intake of 50% or greater.      · Monitoring: Nutrition Progression, Meal Intake, PN Intake, PN Tolerance, Diet Tolerance, Weight, Pertinent Labs      Electronically signed

## 2019-06-07 NOTE — PROGRESS NOTES
Speech Language Pathology  Facility/Department: [facility] SURG SERVICES   CLINICAL BEDSIDE SWALLOW EVALUATION    NAME: [place]  : 11/3/1931  MRN: [id number]    ADMISSION DATE: 2019  ADMITTING DIAGNOSIS: has Perforated duodenal ulcer ([facility] [address] Essential hypertension; CKD (chronic kidney disease), baseline stage unknown; JEFF (acute kidney injury) ([facility] [address] and Bowel perforation ([facility] [address] s/p repair now on  on their problem list.    Date of Eval: 2019  Evaluating Therapist: Karolyn Rodríguez    Current Diet level:  Current Diet : (Clear liquids)  Current Liquid Diet : Clear    Primary Complaint    Reason for Referral  [place] was referred for a bedside swallow evaluation to assess the efficiency of her swallow function, identify signs and symptoms of aspiration and make recommendations regarding safe dietary consistencies, effective compensatory strategies, and safe eating environment. Impression  SLP assessed the patient's swallow function. The patient presented with sluggish and mildly decreased laryngeal elevation with ice chips, puree, nectar-thick and thin H20 for adequate airway protection during swallowing. Even so, no outward S/S of penetration/aspiration noted across all food/drink trials. At this time, would recommend a full liquid diet and nectar-thick liquids, secondary to patient's complaint of a sore mouth and sluggish throat movement. Meds crushed in applesauce/pudding. Assist feed. Thank you for this consult.     Treatment Plan  Requires SLP Intervention: Yes    Recommended Diet and Intervention  Diet Solids Recommendation: (Full liquid)  Liquid Consistency Recommendation: (Full liquids with nectar-thick)  Recommended Form of Meds: Crushed in puree as able  Recommendations: Assist feed  Therapeutic Interventions: Patient/Family education;Diet tolerance monitoring    Compensatory Swallowing Strategies  Compensatory Swallowing Strategies: Small bites/sips;Upright as possible for all oral intake;Assist feed;Remain upright for 30-45 minutes after meals;Eat/Feed slowly    Treatment/Goals  Short-term Goals  Timeframe for Short-term Goals: 1x a day for 2-3 days. Goal 1: Patient will tolerate a full liquid diet and nectar-thick liquids with minimal S/S of penetration/aspiration during PO intake. Goal 2: Patient/caregiver education. Goal 3: Re-assessment of swallow function for potential diet upgrade. General  Chart Reviewed: Yes  Behavior/Cognition: Alert; Cooperative;Pleasant mood  Communication Observation: (Patient 100% intelligible to unfamiliar listeners.)  Follows Directions: Simple  Patient Positioning: Upright in chair  Consistencies Administered: Ice Chips;Puree; Thin - straw;Nectar - straw    Oral Phase Dysfunction  Oral Phase: Exceptions  Oral Phase - Comment: The SLP administered trials of ice chips and puree and the patient independently administered trials of nectar-thick and thin H20 via straw. Oral prep characterized by functional mastication with puree consistency. Oral transit measured 1-2 seconds in length across all food/drink trials. No observed oral cavity residue post swallows with puree. Patient did appear unsteady when bringing drink trials to self. Indicators of Pharyngeal Phase Dysfunction  Pharyngeal Phase: Exceptions  Pharyngeal: The patient presented with sluggish and mildly decreased laryngeal elevation with ice chips, puree, nectar-thick and thin H20 for adequate airway protection during swallowing. Even so, no outward S/S of penetration/aspiration noted across all food/drink trials. At this time, would recommend a full liquid diet and nectar-thick liquids, secondary to patient's complaint of a sore mouth and sluggish throat movement. Meds crushed in applesauce/pudding. Assist feed.       Wm Rapp SLP Graduate Clinician  6/7/2019 2:38 PM

## 2019-06-07 NOTE — PROGRESS NOTES
Pt has not been able to void on her own tonight, pt was bladder scanned at 2230, she was retaining 999ml. Pt was in/out cath and 1150 was obtained.

## 2019-06-08 PROBLEM — K13.0 CHEILITIS: Status: ACTIVE | Noted: 2019-06-08

## 2019-06-08 LAB
ANION GAP SERPL CALCULATED.3IONS-SCNC: 10 MMOL/L (ref 7–19)
BASOPHILS ABSOLUTE: 0 K/UL (ref 0–0.2)
BASOPHILS RELATIVE PERCENT: 0.1 % (ref 0–1)
BUN BLDV-MCNC: 35 MG/DL (ref 8–23)
CALCIUM SERPL-MCNC: 8.6 MG/DL (ref 8.8–10.2)
CHLORIDE BLD-SCNC: 100 MMOL/L (ref 98–111)
CO2: 30 MMOL/L (ref 22–29)
CREAT SERPL-MCNC: 0.9 MG/DL (ref 0.5–0.9)
EOSINOPHILS ABSOLUTE: 0 K/UL (ref 0–0.6)
EOSINOPHILS RELATIVE PERCENT: 0.2 % (ref 0–5)
GFR NON-AFRICAN AMERICAN: 59
GLUCOSE BLD-MCNC: 160 MG/DL (ref 70–99)
GLUCOSE BLD-MCNC: 165 MG/DL (ref 70–99)
GLUCOSE BLD-MCNC: 168 MG/DL (ref 70–99)
GLUCOSE BLD-MCNC: 170 MG/DL (ref 74–109)
GLUCOSE BLD-MCNC: 189 MG/DL (ref 70–99)
HCT VFR BLD CALC: 23.4 % (ref 37–47)
HEMOGLOBIN: 7.3 G/DL (ref 12–16)
LYMPHOCYTES ABSOLUTE: 1.3 K/UL (ref 1.1–4.5)
LYMPHOCYTES RELATIVE PERCENT: 7.9 % (ref 20–40)
MAGNESIUM: 1.5 MG/DL (ref 1.6–2.4)
MCH RBC QN AUTO: 26.7 PG (ref 27–31)
MCHC RBC AUTO-ENTMCNC: 31.2 G/DL (ref 33–37)
MCV RBC AUTO: 85.7 FL (ref 81–99)
MONOCYTES ABSOLUTE: 1.2 K/UL (ref 0–0.9)
MONOCYTES RELATIVE PERCENT: 7.3 % (ref 0–10)
NEUTROPHILS ABSOLUTE: 12.9 K/UL (ref 1.5–7.5)
NEUTROPHILS RELATIVE PERCENT: 77.5 % (ref 50–65)
PDW BLD-RTO: 15.8 % (ref 11.5–14.5)
PERFORMED ON: ABNORMAL
PLATELET # BLD: 298 K/UL (ref 130–400)
PMV BLD AUTO: 9.4 FL (ref 9.4–12.3)
POTASSIUM SERPL-SCNC: 3.4 MMOL/L (ref 3.5–5)
POTASSIUM SERPL-SCNC: 3.7 MMOL/L (ref 3.5–5)
RBC # BLD: 2.73 M/UL (ref 4.2–5.4)
SODIUM BLD-SCNC: 140 MMOL/L (ref 136–145)
WBC # BLD: 16.6 K/UL (ref 4.8–10.8)

## 2019-06-08 PROCEDURE — 85025 COMPLETE CBC W/AUTO DIFF WBC: CPT

## 2019-06-08 PROCEDURE — 6360000002 HC RX W HCPCS: Performed by: SURGERY

## 2019-06-08 PROCEDURE — 6370000000 HC RX 637 (ALT 250 FOR IP): Performed by: FAMILY MEDICINE

## 2019-06-08 PROCEDURE — 6370000000 HC RX 637 (ALT 250 FOR IP): Performed by: SURGERY

## 2019-06-08 PROCEDURE — 83735 ASSAY OF MAGNESIUM: CPT

## 2019-06-08 PROCEDURE — 84132 ASSAY OF SERUM POTASSIUM: CPT

## 2019-06-08 PROCEDURE — C9113 INJ PANTOPRAZOLE SODIUM, VIA: HCPCS | Performed by: SURGERY

## 2019-06-08 PROCEDURE — 94762 N-INVAS EAR/PLS OXIMTRY CONT: CPT

## 2019-06-08 PROCEDURE — 2700000000 HC OXYGEN THERAPY PER DAY

## 2019-06-08 PROCEDURE — 99233 SBSQ HOSP IP/OBS HIGH 50: CPT | Performed by: FAMILY MEDICINE

## 2019-06-08 PROCEDURE — 94640 AIRWAY INHALATION TREATMENT: CPT

## 2019-06-08 PROCEDURE — 80048 BASIC METABOLIC PNL TOTAL CA: CPT

## 2019-06-08 PROCEDURE — 6360000002 HC RX W HCPCS: Performed by: INTERNAL MEDICINE

## 2019-06-08 PROCEDURE — 1210000000 HC MED SURG R&B

## 2019-06-08 PROCEDURE — 2580000003 HC RX 258: Performed by: SURGERY

## 2019-06-08 PROCEDURE — 2500000003 HC RX 250 WO HCPCS: Performed by: SURGERY

## 2019-06-08 PROCEDURE — 82948 REAGENT STRIP/BLOOD GLUCOSE: CPT

## 2019-06-08 PROCEDURE — 6360000002 HC RX W HCPCS: Performed by: FAMILY MEDICINE

## 2019-06-08 PROCEDURE — 99024 POSTOP FOLLOW-UP VISIT: CPT | Performed by: SURGERY

## 2019-06-08 RX ORDER — BENZONATATE 100 MG/1
100 CAPSULE ORAL 3 TIMES DAILY PRN
Status: DISCONTINUED | OUTPATIENT
Start: 2019-06-08 | End: 2019-06-12 | Stop reason: HOSPADM

## 2019-06-08 RX ORDER — FUROSEMIDE 10 MG/ML
20 INJECTION INTRAMUSCULAR; INTRAVENOUS ONCE
Status: COMPLETED | OUTPATIENT
Start: 2019-06-08 | End: 2019-06-08

## 2019-06-08 RX ADMIN — Medication 10 ML: at 22:11

## 2019-06-08 RX ADMIN — ACETAMINOPHEN 325 MG: 325 TABLET ORAL at 16:25

## 2019-06-08 RX ADMIN — INSULIN LISPRO 1 UNITS: 100 INJECTION, SOLUTION INTRAVENOUS; SUBCUTANEOUS at 22:11

## 2019-06-08 RX ADMIN — FUROSEMIDE 20 MG: 10 INJECTION, SOLUTION INTRAMUSCULAR; INTRAVENOUS at 18:20

## 2019-06-08 RX ADMIN — POTASSIUM CHLORIDE 20 MEQ: 400 INJECTION, SOLUTION INTRAVENOUS at 07:09

## 2019-06-08 RX ADMIN — INSULIN LISPRO 1 UNITS: 100 INJECTION, SOLUTION INTRAVENOUS; SUBCUTANEOUS at 17:46

## 2019-06-08 RX ADMIN — IPRATROPIUM BROMIDE AND ALBUTEROL SULFATE 1 AMPULE: 2.5; .5 SOLUTION RESPIRATORY (INHALATION) at 07:41

## 2019-06-08 RX ADMIN — FUROSEMIDE 20 MG: 10 INJECTION, SOLUTION INTRAMUSCULAR; INTRAVENOUS at 10:49

## 2019-06-08 RX ADMIN — HYDROCODONE BITARTRATE AND ACETAMINOPHEN 1 TABLET: 5; 325 TABLET ORAL at 00:04

## 2019-06-08 RX ADMIN — POTASSIUM CHLORIDE 20 MEQ: 400 INJECTION, SOLUTION INTRAVENOUS at 05:13

## 2019-06-08 RX ADMIN — BENZONATATE 100 MG: 100 CAPSULE ORAL at 14:02

## 2019-06-08 RX ADMIN — MAGNESIUM SULFATE HEPTAHYDRATE 1 G: 1 INJECTION, SOLUTION INTRAVENOUS at 08:57

## 2019-06-08 RX ADMIN — INSULIN LISPRO 1 UNITS: 100 INJECTION, SOLUTION INTRAVENOUS; SUBCUTANEOUS at 13:15

## 2019-06-08 RX ADMIN — ASCORBIC ACID, VITAMIN A PALMITATE, CHOLECALCIFEROL, THIAMINE HYDROCHLORIDE, RIBOFLAVIN-5 PHOSPHATE SODIUM, PYRIDOXINE HYDROCHLORIDE, NIACINAMIDE, DEXPANTHENOL, ALPHA-TOCOPHEROL ACETATE, VITAMIN K1, FOLIC ACID, BIOTIN, CYANOCOBALAMIN: 200; 3300; 200; 6; 3.6; 6; 40; 15; 10; 150; 600; 60; 5 INJECTION, SOLUTION INTRAVENOUS at 18:23

## 2019-06-08 RX ADMIN — IRON SUCROSE 100 MG: 20 INJECTION, SOLUTION INTRAVENOUS at 14:43

## 2019-06-08 RX ADMIN — HYDROCODONE BITARTRATE AND ACETAMINOPHEN 1 TABLET: 5; 325 TABLET ORAL at 04:05

## 2019-06-08 RX ADMIN — IPRATROPIUM BROMIDE AND ALBUTEROL SULFATE 1 AMPULE: 2.5; .5 SOLUTION RESPIRATORY (INHALATION) at 11:20

## 2019-06-08 RX ADMIN — IPRATROPIUM BROMIDE AND ALBUTEROL SULFATE 1 AMPULE: 2.5; .5 SOLUTION RESPIRATORY (INHALATION) at 15:48

## 2019-06-08 RX ADMIN — CEFAZOLIN SODIUM 1 G: 1 INJECTION, SOLUTION INTRAVENOUS at 16:25

## 2019-06-08 RX ADMIN — ACETAMINOPHEN 325 MG: 325 TABLET ORAL at 08:56

## 2019-06-08 RX ADMIN — METOPROLOL TARTRATE 25 MG: 25 TABLET ORAL at 22:11

## 2019-06-08 RX ADMIN — ACETAMINOPHEN 325 MG: 325 TABLET ORAL at 22:10

## 2019-06-08 RX ADMIN — Medication 5 ML: at 22:10

## 2019-06-08 RX ADMIN — PANTOPRAZOLE SODIUM 8 MG/HR: 40 INJECTION, POWDER, FOR SOLUTION INTRAVENOUS at 03:58

## 2019-06-08 RX ADMIN — CEFAZOLIN SODIUM 1 G: 1 INJECTION, SOLUTION INTRAVENOUS at 10:31

## 2019-06-08 RX ADMIN — ACETAMINOPHEN 325 MG: 325 TABLET ORAL at 13:14

## 2019-06-08 RX ADMIN — Medication 5 ML: at 14:50

## 2019-06-08 RX ADMIN — Medication 10 ML: at 09:11

## 2019-06-08 RX ADMIN — CEFAZOLIN SODIUM 1 G: 1 INJECTION, SOLUTION INTRAVENOUS at 00:08

## 2019-06-08 RX ADMIN — MAGNESIUM SULFATE HEPTAHYDRATE 1 G: 1 INJECTION, SOLUTION INTRAVENOUS at 05:52

## 2019-06-08 RX ADMIN — PANTOPRAZOLE SODIUM 8 MG/HR: 40 INJECTION, POWDER, FOR SOLUTION INTRAVENOUS at 11:44

## 2019-06-08 RX ADMIN — METOPROLOL TARTRATE 25 MG: 25 TABLET ORAL at 08:56

## 2019-06-08 RX ADMIN — INSULIN LISPRO 1 UNITS: 100 INJECTION, SOLUTION INTRAVENOUS; SUBCUTANEOUS at 09:13

## 2019-06-08 ASSESSMENT — PAIN SCALES - WONG BAKER
WONGBAKER_NUMERICALRESPONSE: 8
WONGBAKER_NUMERICALRESPONSE: 10

## 2019-06-08 ASSESSMENT — PAIN SCALES - GENERAL
PAINLEVEL_OUTOF10: 0
PAINLEVEL_OUTOF10: 5
PAINLEVEL_OUTOF10: 4
PAINLEVEL_OUTOF10: 5
PAINLEVEL_OUTOF10: 0
PAINLEVEL_OUTOF10: 5
PAINLEVEL_OUTOF10: 3
PAINLEVEL_OUTOF10: 10
PAINLEVEL_OUTOF10: 4
PAINLEVEL_OUTOF10: 10
PAINLEVEL_OUTOF10: 4
PAINLEVEL_OUTOF10: 3

## 2019-06-08 NOTE — PROGRESS NOTES
Hospitalist Progress Note  6/8/2019 1:42 PM  Subjective:   Admit Date: 6/1/2019  PCP: Hien Yoon    Chief Complaint: abdominal pain    Subjective: Complains of pain in her mouth and down her throat when she swallows food. History of thrush. No white plaques visible anteriorly but some redness and irritation of the tongue. Cough this morning prior to sitting up to chair, which has greatly improved her cough. ROS: 14 point review of systems is negative except as specifically addressed above. PN-Adult Premix 5/20 - Standard Electrolytes - Central Line  DIET FULL LIQUID;     Intake/Output Summary (Last 24 hours) at 6/8/2019 1342  Last data filed at 6/8/2019 1245  Gross per 24 hour   Intake 2392 ml   Output 2980 ml   Net -588 ml     Medications:   PN-Adult Premix 5/20 - Standard Electrolytes - Central Line 75 mL/hr at 06/07/19 1808    pantoprozole (PROTONIX) infusion 8 mg/hr (06/08/19 1144)     Current Facility-Administered Medications   Medication Dose Route Frequency Provider Last Rate Last Dose    magic (miracle) mouthwash  5 mL Swish & Swallow 4x Daily PRN George Lockwood, DO        benzonatate (TESSALON) capsule 100 mg  100 mg Oral TID PRN George Lockwood, DO        ceFAZolin (ANCEF) 1 g in dextrose 5 % 50 mL IVPB (premix)  1 g Intravenous Q8H Jero Gordillo MD   Stopped at 06/08/19 1144    PN-Adult Premix 5/20 - Standard Electrolytes - Central Line   Intravenous Continuous TPN Jero Gordillo MD 75 mL/hr at 06/07/19 1808      acetaminophen (TYLENOL) tablet 325 mg  325 mg Oral 4x Daily Jero Gordillo MD   325 mg at 06/08/19 1314    HYDROmorphone (DILAUDID) injection 0.25 mg  0.25 mg Intravenous Q3H PRN Jero Gordillo MD   0.25 mg at 06/06/19 1707    enoxaparin (LOVENOX) injection 40 mg  40 mg Subcutaneous Daily Jero Gordillo MD   Stopped at 06/08/19 0916    metoprolol tartrate (LOPRESSOR) tablet 25 mg  25 mg Oral BID Jero Gordillo MD   25 mg at 06/08/19 0856    vitamin D (ERGOCALCIFEROL) capsule 50,000 Units  50,000 Units Oral Weekly Ruben Monteiro MD   50,000 Units at 06/06/19 1638    HYDROcodone-acetaminophen (NORCO) 5-325 MG per tablet 1 tablet  1 tablet Oral Q4H PRN Janae Moreland MD   1 tablet at 06/08/19 0405    Or    HYDROcodone-acetaminophen (NORCO) 5-325 MG per tablet 2 tablet  2 tablet Oral Q4H PRN Janae Moreland MD        magnesium sulfate 1 g in dextrose 5% 100 mL IVPB  1 g Intravenous PRN Judy Millard MD   Stopped at 06/08/19 1032    potassium chloride 20 mEq/50 mL IVPB (Central Line)  20 mEq Intravenous PRN Judy Millard MD 50 mL/hr at 06/08/19 0709 20 mEq at 06/08/19 0709    insulin lispro (HUMALOG) injection vial 0-6 Units  0-6 Units Subcutaneous TID WC Judy Millard MD   1 Units at 06/08/19 1315    insulin lispro (HUMALOG) injection vial 0-3 Units  0-3 Units Subcutaneous Nightly Judy Millard MD   1 Units at 06/07/19 2147    iron sucrose (VENOFER) injection 100 mg  100 mg Intravenous Q24H Judy Millard MD   100 mg at 06/07/19 1601    morphine injection 0.5 mg  0.5 mg Intravenous Q30 Min PRN Judy Millard MD   0.5 mg at 06/06/19 1221    fat emulsion 20 % infusion 250 mL  250 mL Intravenous Once per day on Mon Thu Judy Millard MD   Stopped at 06/06/19 4059    ipratropium-albuterol (DUONEB) nebulizer solution 1 ampule  1 ampule Inhalation Q4H WA Judy Millard MD   1 ampule at 06/08/19 1120    ondansetron (ZOFRAN) injection 4 mg  4 mg Intravenous Q6H PRN Judy Millard MD   4 mg at 06/01/19 1212    sodium chloride flush 0.9 % injection 10 mL  10 mL Intravenous 2 times per day Judy Millard MD   10 mL at 06/08/19 0911    sodium chloride flush 0.9 % injection 10 mL  10 mL Intravenous PRN Judy Millard MD   10 mL at 06/03/19 0004    pantoprazole (PROTONIX) 80 mg in sodium chloride 0.9 % 100 mL infusion  8 mg/hr Intravenous Continuous Judy Millard MD 10 mL/hr at 06/08/19 1144 8 mg/hr at 06/08/19 1144        Labs: Recent Labs     06/06/19  0325 06/07/19  0200 06/08/19  0400   WBC 14.8* 13.7* 16.6*   RBC 3.12* 2.94* 2.73*   HGB 8.4* 7.7* 7.3*   HCT 27.0* 25.1* 23.4*   MCV 86.5 85.4 85.7   MCH 26.9* 26.2* 26.7*   MCHC 31.1* 30.7* 31.2*    287 298     Recent Labs     06/06/19  0325  06/07/19  0200 06/08/19  0400 06/08/19  1030     --  140 140  --    K 3.5   < > 3.3* 3.4* 3.7   ANIONGAP 9  --  8 10  --      --  103 100  --    CO2 25  --  29 30*  --    BUN 25*  --  28* 35*  --    CREATININE 1.1*  --  0.9 0.9  --    GLUCOSE 143*  --  129* 170*  --    CALCIUM 9.0  --  8.8 8.6*  --     < > = values in this interval not displayed. Recent Labs     06/06/19  0957 06/07/19  0200 06/08/19  0400   MG 1.7 1.6 1.5*     No results for input(s): AST, ALT, ALB, BILITOT, ALKPHOS, ALB in the last 72 hours. ABGs:No results for input(s): PH, PO2, PCO2, HCO3, BE, O2SAT in the last 72 hours. Troponin T: No results for input(s): TROPONINI in the last 72 hours. INR: No results for input(s): INR in the last 72 hours. Lactic Acid: No results for input(s): LACTA in the last 72 hours.     Objective:   Vitals: BP (!) 153/82   Pulse 81   Temp 98.6 °F (37 °C) (Temporal)   Resp 16   Ht 5' 4\" (1.626 m)   Wt 167 lb 5 oz (75.9 kg)   SpO2 97%   BMI 28.72 kg/m²   24HR INTAKE/OUTPUT:      Intake/Output Summary (Last 24 hours) at 6/8/2019 1342  Last data filed at 6/8/2019 1245  Gross per 24 hour   Intake 2392 ml   Output 2980 ml   Net -588 ml     General appearance: alert and cooperative with exam  HEENT: atraumatic, eyes with clear conjunctiva and normal lids, pupils and irises normal, external ears and nose are normal, lips normal  Neck without masses, lympadenopathy, bruit, thyroid normal  Lungs: no increased work of breathing, diminished breath sounds bilaterally and wheezes bilaterally  Heart: tachycardic but regular  Abdomen: soft, nondistended, bowel sounds present  Extremities: extremities normal, atraumatic, no cyanosis or edema  Neurologic: no focal neurologic deficits, normal sensation, alert and oriented, affect and mood appropriate  Skin: no rashes, nodules    Assessment and Plan:   Principal Problem:    Perforated duodenal ulcer ([facility] [address]  Active Problems:    Essential hypertension    CKD (chronic kidney disease), baseline stage unknown    JEFF (acute kidney injury) ([facility] [address]    Bowel perforation ([facility] [address] s/p repair now on [id number]    Cheilitis  Resolved Problems:    * No resolved hospital problems. *    Day #8 cefazolin empiric therapy for perforated duodenal ulcer. Per general surgery. Titrate oxygen to maintain SaO2 > 91%. Was on 5 LPM saturating %, turned down to 3 LPM with SaO2 % during examination. Calleen Neighbors more likely than non-candidal cheilitis due to symptoms extending to esophagus. Miracle mouthwash swish and swallow. Benzonatate PRN cough. Advance Directive: Full Code    DVT prophylaxis: enoxaparin    Discharge planning:  To SNF      DO Aniyah Nayak Hospitalist

## 2019-06-08 NOTE — PROGRESS NOTES
Melinda Jaimes M.D. FACS  Daily Progress Note    Pt Name: Shoaib [address] Record Number: [id number]  Date of Birth 11/3/1931   Today's Date: 6/8/2019    Chief Complaint:  No chief complaint on file. SUBJECTIVE:     [place] is unhappy with most everything. Her oxygen saturation has been somewhat of a problem but improved after 20 mg of Lasix. She has pain pretty much everywhere. Her incision looks okay her NEHAL output is clear.   Most of her problems now are medical.     MEDS:     Scheduled Meds:   ceFAZolin  1 g Intravenous Q8H    acetaminophen  325 mg Oral 4x Daily    metoprolol tartrate  25 mg Oral BID    vitamin D  50,000 Units Oral Weekly    insulin lispro  0-6 Units Subcutaneous TID WC    insulin lispro  0-3 Units Subcutaneous Nightly    iron sucrose  100 mg Intravenous Q24H    fat emulsion  250 mL Intravenous Once per day on Mon Thu    ipratropium-albuterol  1 ampule Inhalation Q4H WA    sodium chloride flush  10 mL Intravenous 2 times per day     Continuous Infusions:   PN-Adult Premix 5/20 - Standard Electrolytes - Central Line 75 mL/hr at 06/07/19 1808    pantoprozole (PROTONIX) infusion 8 mg/hr (06/08/19 1144)     PRN Meds:  magic (miracle) mouthwash 5 mL 4x Daily PRN   benzonatate 100 mg TID PRN   HYDROmorphone 0.25 mg Q3H PRN   HYDROcodone 5 mg - acetaminophen 1 tablet Q4H PRN   Or     HYDROcodone 5 mg - acetaminophen 2 tablet Q4H PRN   magnesium sulfate 1 g PRN   potassium chloride 20 mEq PRN   morphine 0.5 mg Q30 Min PRN   ondansetron 4 mg Q6H PRN   sodium chloride flush 10 mL PRN       OBJECTIVE:     Patient Vitals for the past 24 hrs:   BP Temp Temp src Pulse Resp SpO2   06/08/19 1548 -- -- -- -- -- 95 %   06/08/19 1415 -- -- -- -- -- 98 %   06/08/19 1354 (!) 149/71 98.4 °F (36.9 °C) Temporal 99 16 99 %   06/08/19 1019 -- -- -- 81 -- --   06/08/19 1001 (!) 153/82 98.6 °F (37 °C) Temporal 86 16 97 %   06/08/19 0741 -- -- -- -- -- 92 %   06/08/19 0649 (!) interval not displayed. PHYSICAL EXAM:     CONSTITUTIONAL: Alert, appropriate, no acute distress  SKIN: warm, dry with no rashes or lesions  EYES: Non icteric  CHEST/LUNGS: CTA bilaterally  CARDIOVASCULAR: RRR    ABDOMEN: soft, ND, appropriately TTP, +BS  Incisions: Clean, dry, and intact with no erythema or induration  NEUROLOGIC: CN II-XI grossly intact, no motor or sensory deficits   EXTREMITIES: warm, well perfused, no edema     ASSESSMENT:       1. HD # 7  Patient Active Problem List   Diagnosis    Perforated duodenal ulcer ([address]    Essential hypertension    CKD (chronic kidney disease), baseline stage unknown    JEFF (acute kidney injury) ([address]    Bowel perforation ([address] s/p repair now on [id number]    Cheilitis   2. PLAN:     1. Try to ambulate  2. Continue medical management  3. Mann Herzog M.D.  FACS  Electronically signed 6/8/2019 at 5:35 PM

## 2019-06-08 NOTE — PROGRESS NOTES
Nephrology ([address] [facility]) Progress Note    Patient:  Andrea Murphy  YOB: 1931  Date of Service: 6/8/2019  MRN: [id number]   Acct: [de-identified]   Primary Care Physician: Trinh Simon  Advance Directive: Full Code  Admit Date: 6/1/2019       Hospital Day: 7  Referring Provider: Dariel Galvan MD    Patient independently seen and examined, Chart, Consults, Notes, Operative notes, Labs, Cardiology, and Radiology studies reviewed as able. Subjective:  Andrea Murphy is a 80 y.o. female  whom we were consulted for acute kidney injury. Patient denies any history of chronic kidney disease. On June 1, she presented to the emergency room with severe abdominal pain. Five days prior to this presentation, she had been complaining of severe sciatica and has taken ibuprofen 400 mg at least 4-5 times a day. For the last 3 days prior to admission, she had been complaining of abdominal pain and the pain was getting worse, so the patient decided to call 911. On arrival to her local hospital, CT scan of the abdomen was done which was consistent with free air in the abdomen. She was transferred to [facility] for emergent laparotomy. Expiratory laparotomy was then done, consistent with perforated duodenal ulcer. Surgery was performed without any complication. Today, no new events noted. Denies chest pain, nausea or vomiting. No shortness of air. Allergies:  Patient has no known allergies.     Medicines:  Current Facility-Administered Medications   Medication Dose Route Frequency Provider Last Rate Last Dose    ceFAZolin (ANCEF) 1 g in dextrose 5 % 50 mL IVPB (premix)  1 g Intravenous Q8H Dariel Galvan  mL/hr at 06/08/19 1031 1 g at 06/08/19 1031    PN-Adult Premix 5/20 - Standard Electrolytes - Central Line   Intravenous Continuous TPN Dariel Galvan MD 75 mL/hr at 06/07/19 1808      acetaminophen (TYLENOL) tablet 325 mg  325 mg Oral 4x Daily Dariel Galvan MD injection 10 mL  10 mL Intravenous 2 times per day Dariel Galvan MD   10 mL at 19 0911    sodium chloride flush 0.9 % injection 10 mL  10 mL Intravenous PRN Dariel Galvan MD   10 mL at 19 0004    pantoprazole (PROTONIX) 80 mg in sodium chloride 0.9 % 100 mL infusion  8 mg/hr Intravenous Continuous Dariel Galvan MD 10 mL/hr at 19 0358 8 mg/hr at 19 0358       Past Medical History:  Past Medical History:   Diagnosis Date    Bowel perforation [facility]), s/p repair on      CKD (chronic kidney disease), baseline stage unknown     Essential hypertension        Past Surgical History:  Past Surgical History:   Procedure Laterality Date    LAPAROTOMY EXPLORATORY N/A 2019    CENTRAL LINE PLACEMENT LAPAROTOMY EXPLORATORY GRAM PATCH REPAIR OF DUODENAL ULCER performed by Dariel Galvan MD at [facility] OR       Family History  Family History   Problem Relation Age of Onset    No Known Problems Mother          of smoke inhalation in fire    Cancer Father          of cancer of prostate    Other Daughter         sciatica    No Known Problems Daughter     No Known Problems Son     No Known Problems Son         has back surgery       Social History  Social History     Socioeconomic History    Marital status:       Spouse name: Not on file    Number of children: 3    Years of education: Not on file    Highest education level: Not on file   Occupational History    Occupation: retired teacher at [employer]   Social Needs    Financial resource strain: Not on file    Food insecurity:     Worry: Not on file     Inability: Not on file   quickhuddle needs:     Medical: Not on file     Non-medical: Not on file   Tobacco Use    Smoking status: Never Smoker    Smokeless tobacco: Never Used   Substance and Sexual Activity    Alcohol use: Not Currently    Drug use: Never    Sexual activity: Not on file   Lifestyle    Physical activity:     Days per week: Not on file     Minutes per session: Not on file    Stress: Not on file   Relationships    Social connections:     Talks on phone: Not on file     Gets together: Not on file     Attends Mandaeism service: Not on file     Active member of club or organization: Not on file     Attends meetings of clubs or organizations: Not on file     Relationship status: Not on file    Intimate partner violence:     Fear of current or ex partner: Not on file     Emotionally abused: Not on file     Physically abused: Not on file     Forced sexual activity: Not on file   Other Topics Concern    Not on file   Social History Narrative    CODE STATUS: Full Code    HEALTH CARE PROXY: her daughter, Mrs. [place]: lives in a private home, no stairs inside home, lives alone, has 1 step in to home    AMBULATES: ambulates independantly         Review of Systems:  History obtained from chart review and the patient  General ROS: No fever or chills  Respiratory ROS: No cough, shortness of breath, wheezing  Cardiovascular ROS: No chest pain or palpitations  Gastrointestinal ROS: No abdominal pain or melena  Genito-Urinary ROS: No dysuria or hematuria  Musculoskeletal ROS: No joint pain or swelling         Objective:  Patient Vitals for the past 24 hrs:   BP Temp Temp src Pulse Resp SpO2   06/08/19 1019 -- -- -- 81 -- --   06/08/19 1001 (!) 153/82 98.6 °F (37 °C) Temporal 86 16 97 %   06/08/19 0741 -- -- -- -- -- 92 %   06/08/19 0649 (!) 148/73 98.6 °F (37 °C) Temporal 104 16 95 %   06/08/19 0300 (!) 140/76 96.6 °F (35.9 °C) Temporal 103 16 97 %   06/07/19 2216 (!) 148/77 96.1 °F (35.6 °C) Temporal 115 16 94 %   06/07/19 2100 -- -- -- 110 -- --   06/07/19 1911 134/70 97.3 °F (36.3 °C) Temporal 110 14 92 %   06/07/19 1548 -- -- -- -- -- 95 %   06/07/19 1434 132/66 99 °F (37.2 °C) Temporal 108 16 94 %       Intake/Output Summary (Last 24 hours) at 6/8/2019 1043  Last data filed at 6/8/2019 0800  Gross per 24 hour   Intake 2392 ml Output 1450 ml   Net 942 ml     General: awake/alert   Chest:  clear to auscultation bilaterally without respiratory distress  CVS: regular rate and rhythm  Abdominal: soft, nontender, normal bowel sounds  Extremities: no cyanosis, ble edema  Skin: warm and dry without rash    Labs:  BMP:   Recent Labs     06/06/19  0325 06/07/19  0119 06/07/19  0200 06/08/19  0400     --  140 140   K 3.5 3.2 3.3* 3.4*     --  103 100   CO2 25  --  29 30*   BUN 25*  --  28* 35*   CREATININE 1.1*  --  0.9 0.9   CALCIUM 9.0  --  8.8 8.6*     CBC:   Recent Labs     06/06/19  0325 06/07/19  0200 06/08/19  0400   WBC 14.8* 13.7* 16.6*   HGB 8.4* 7.7* 7.3*   HCT 27.0* 25.1* 23.4*   MCV 86.5 85.4 85.7    287 298     LIVER PROFILE:   No results for input(s): AST, ALT, LIPASE, BILIDIR, BILITOT, ALKPHOS in the last 72 hours. Invalid input(s): AMYLASE,  ALB  PT/INR: No results for input(s): PROTIME, INR in the last 72 hours. APTT: No results for input(s): APTT in the last 72 hours. BNP:  No results for input(s): BNP in the last 72 hours. Ionized Calcium:No results for input(s): IONCA in the last 72 hours. Magnesium:  Recent Labs     06/06/19  0957 06/07/19  0200 06/08/19  0400   MG 1.7 1.6 1.5*     Phosphorus:No results for input(s): PHOS in the last 72 hours. HgbA1C: No results for input(s): LABA1C in the last 72 hours. Hepatic:   No results for input(s): ALKPHOS, ALT, AST, PROT, BILITOT, BILIDIR, LABALBU in the last 72 hours. Lactic Acid: No results for input(s): LACTA in the last 72 hours. Troponin: No results for input(s): CKTOTAL, CKMB, TROPONINT in the last 72 hours. ABGs:   Lab Results   Component Value Date    PHART 7.430 06/07/2019    PO2ART 74.0 06/07/2019    FDQ4JUN 44.0 06/07/2019     CRP:  No results for input(s): CRP in the last 72 hours. Sed Rate:  No results for input(s): SEDRATE in the last 72 hours.     Culture Results:   Blood Culture Recent: No results for input(s): BC in the last 720 hours.    Urine Culture Recent : No results for input(s): LABURIN in the last 720 hours. Radiology reports as per the Radiologist  Radiology: Us Renal Complete    Result Date: 6/2/2019  RENAL ULTRASOUND COMPLETE 6/2/2019 8:45 AM REASON FOR EXAM: Acute kidney injury  COMPARISON: None  TECHNIQUE: Multiple longitudinal and transverse realtime sonographic images of the kidneys and urinary bladder are obtained. FINDINGS: The right kidney measures 11.0 cm. The cortical thickness within the right kidney is 10 mm and 10 mm respectively in the upper and lower pole. The right kidney is normal in size, shape, contour and position. There is an exophytic cyst involving the upper pole of the right kidney measuring 2.3 x 1.9 x 1.8 cm in size. The cortical thickness is normal, with preservation of the corticomedullary differentiation. The central echo complex is compact with no evidence for hydronephrosis. No nephrolithiasis or abnormal perinephric fluid collections are seen. No hydroureter. The left kidney measures 11.5 cm. The cortical thickness within the left kidney is 19 mm  and 17 mm respectively in the upper and lower pole. The left kidney is normal in size, shape, contour and position. There is a cyst involving the upper pole the left kidney measuring 1.9 x 1.9 x 2.6 cm in size. The cortical thickness is normal, with preservation of the corticomedullary differentiation. The central echo complex is compact with no evidence for hydronephrosis. No nephrolithiasis or abnormal perinephric fluid collections are seen. No hydroureter. Scanning through the pelvis demonstrates a Mclain catheter within the bladder. . The wall thickness and contour cannot be assessed. There is no surrounding ascites. 1. Cortical cysts of the kidneys. Otherwise normal renal ultrasound. 2. Mclain catheter in place within the bladder. . Signed by Dr Orly Chong on 6/2/2019 10:38 AM    Xr Chest Portable    Result Date: 6/1/2019  EXAMINATION: Chest one view 6/1/2019 HISTORY: Line placement FINDINGS: Upright frontal projection of the chest demonstrates pulmonary venous hypertension with interstitial edema and small effusions. An NG tube has been advanced with the tip in the distal body of the stomach. A right IJ deep line has been placed with no evidence of complications. 1.. NG tube and right IJ deep line placed with no complications. They are well-positioned. 2. Pulmonary venous hypertension with interstitial edema and small effusions.  Signed by Dr Poli Roy on 6/1/2019 10:49 AM       Assessment   Acute kidney injury with unknown baseline  Suspect some chronic kidney disease at baseline  Perforated duodenal ulcer status post exploratory laparotomy  Metabolic acidosis  Acute blood loss anemia with iron deficiency  Vitamin D deficiency  Secondary hyperparathyroidism    Plan:  Added vitamin D as now taking oral meds  Replace potassium and magnesium IV again today and as needed  Continue TPN  Transfused packed red blood cells as needed  Iron replacement, given transfusion requirements may benefit from IV therapy  IV Adriana Cedillo MD  06/08/19  10:43 AM

## 2019-06-08 NOTE — PROGRESS NOTES
Patient resting well this shift, vs stable and noted to have had massive bm this shift, patient stated she felt better, will cont to monitor Electronically signed by Renea Romero RN on 6/7/2019 at 11:54 PM

## 2019-06-08 NOTE — PLAN OF CARE
Problem: Falls - Risk of:  Goal: Will remain free from falls  Description  Will remain free from falls  Outcome: Met This Shift  Goal: Absence of physical injury  Description  Absence of physical injury  Outcome: Met This Shift     Problem: SKIN INTEGRITY  Goal: Skin integrity is maintained or improved  Outcome: Met This Shift     Problem: Risk for Impaired Skin Integrity  Goal: Tissue integrity - skin and mucous membranes  Description  Structural intactness and normal physiological function of skin and  mucous membranes. Outcome: Met This Shift     Problem: Pain:  Description  Pain management should include both nonpharmacologic and pharmacologic interventions. Goal: Pain level will decrease  Description  Pain level will decrease  Outcome: Ongoing  Goal: Control of acute pain  Description  Control of acute pain  Outcome: Ongoing  Goal: Control of chronic pain  Description  Control of chronic pain  Outcome: Ongoing     Problem: Nutrition  Goal: Optimal nutrition therapy  Description  Nutrition Problem: Inadequate oral intake  Intervention: Food and/or Nutrient Delivery: Continue Parenteral Nutrition, Continue current diet  Nutritional Goals: New Goal: Pt will continue to tolerate TPN and PO intake of 50% or greater.               Outcome: Ongoing     Problem: HEMODYNAMIC STATUS  Goal: Patient has stable vital signs and fluid balance  Outcome: Ongoing     Problem: OXYGENATION/RESPIRATORY FUNCTION  Goal: Patient will achieve/maintain normal respiratory rate/effort  Outcome: Ongoing     Problem: MOBILITY  Goal: Early mobilization is achieved  Outcome: Ongoing     Problem: ELIMINATION  Goal: Elimination patterns are normal or improving  Description  Elimination patterns return to pre-surgery normal patterns  Outcome: Ongoing

## 2019-06-09 LAB
ANION GAP SERPL CALCULATED.3IONS-SCNC: 9 MMOL/L (ref 7–19)
BASOPHILS ABSOLUTE: 0 K/UL (ref 0–0.2)
BASOPHILS RELATIVE PERCENT: 0.1 % (ref 0–1)
BUN BLDV-MCNC: 30 MG/DL (ref 8–23)
CALCIUM SERPL-MCNC: 8.5 MG/DL (ref 8.8–10.2)
CHLORIDE BLD-SCNC: 99 MMOL/L (ref 98–111)
CO2: 33 MMOL/L (ref 22–29)
CREAT SERPL-MCNC: 0.8 MG/DL (ref 0.5–0.9)
EOSINOPHILS ABSOLUTE: 0.3 K/UL (ref 0–0.6)
EOSINOPHILS RELATIVE PERCENT: 2 % (ref 0–5)
GFR NON-AFRICAN AMERICAN: >60
GLUCOSE BLD-MCNC: 180 MG/DL (ref 74–109)
GLUCOSE BLD-MCNC: 182 MG/DL (ref 70–99)
GLUCOSE BLD-MCNC: 190 MG/DL (ref 70–99)
GLUCOSE BLD-MCNC: 196 MG/DL (ref 70–99)
GLUCOSE BLD-MCNC: 199 MG/DL (ref 70–99)
HCT VFR BLD CALC: 25.1 % (ref 37–47)
HEMOGLOBIN: 7.9 G/DL (ref 12–16)
LYMPHOCYTES ABSOLUTE: 1.1 K/UL (ref 1.1–4.5)
LYMPHOCYTES RELATIVE PERCENT: 6.9 % (ref 20–40)
MAGNESIUM: 1.6 MG/DL (ref 1.6–2.4)
MCH RBC QN AUTO: 27.1 PG (ref 27–31)
MCHC RBC AUTO-ENTMCNC: 31.5 G/DL (ref 33–37)
MCV RBC AUTO: 86.3 FL (ref 81–99)
MONOCYTES ABSOLUTE: 0.9 K/UL (ref 0–0.9)
MONOCYTES RELATIVE PERCENT: 5.4 % (ref 0–10)
NEUTROPHILS ABSOLUTE: 13.8 K/UL (ref 1.5–7.5)
NEUTROPHILS RELATIVE PERCENT: 82.8 % (ref 50–65)
PDW BLD-RTO: 16.2 % (ref 11.5–14.5)
PERFORMED ON: ABNORMAL
PLATELET # BLD: 324 K/UL (ref 130–400)
PMV BLD AUTO: 9.2 FL (ref 9.4–12.3)
POTASSIUM SERPL-SCNC: 3.2 MMOL/L (ref 3.5–5)
POTASSIUM SERPL-SCNC: 4 MMOL/L (ref 3.5–5)
RBC # BLD: 2.91 M/UL (ref 4.2–5.4)
SODIUM BLD-SCNC: 141 MMOL/L (ref 136–145)
WBC # BLD: 16.6 K/UL (ref 4.8–10.8)

## 2019-06-09 PROCEDURE — 82948 REAGENT STRIP/BLOOD GLUCOSE: CPT

## 2019-06-09 PROCEDURE — 6370000000 HC RX 637 (ALT 250 FOR IP): Performed by: SURGERY

## 2019-06-09 PROCEDURE — 6360000002 HC RX W HCPCS: Performed by: SURGERY

## 2019-06-09 PROCEDURE — 83735 ASSAY OF MAGNESIUM: CPT

## 2019-06-09 PROCEDURE — 2700000000 HC OXYGEN THERAPY PER DAY

## 2019-06-09 PROCEDURE — 85025 COMPLETE CBC W/AUTO DIFF WBC: CPT

## 2019-06-09 PROCEDURE — C9113 INJ PANTOPRAZOLE SODIUM, VIA: HCPCS | Performed by: SURGERY

## 2019-06-09 PROCEDURE — 80048 BASIC METABOLIC PNL TOTAL CA: CPT

## 2019-06-09 PROCEDURE — 99024 POSTOP FOLLOW-UP VISIT: CPT | Performed by: SURGERY

## 2019-06-09 PROCEDURE — 2580000003 HC RX 258: Performed by: SURGERY

## 2019-06-09 PROCEDURE — 1210000000 HC MED SURG R&B

## 2019-06-09 PROCEDURE — 2500000003 HC RX 250 WO HCPCS: Performed by: SURGERY

## 2019-06-09 PROCEDURE — 94762 N-INVAS EAR/PLS OXIMTRY CONT: CPT

## 2019-06-09 PROCEDURE — 84132 ASSAY OF SERUM POTASSIUM: CPT

## 2019-06-09 PROCEDURE — 99232 SBSQ HOSP IP/OBS MODERATE 35: CPT | Performed by: FAMILY MEDICINE

## 2019-06-09 PROCEDURE — 94761 N-INVAS EAR/PLS OXIMETRY MLT: CPT

## 2019-06-09 PROCEDURE — 94640 AIRWAY INHALATION TREATMENT: CPT

## 2019-06-09 RX ADMIN — INSULIN LISPRO 1 UNITS: 100 INJECTION, SOLUTION INTRAVENOUS; SUBCUTANEOUS at 22:20

## 2019-06-09 RX ADMIN — IRON SUCROSE 100 MG: 20 INJECTION, SOLUTION INTRAVENOUS at 15:22

## 2019-06-09 RX ADMIN — CEFAZOLIN SODIUM 1 G: 1 INJECTION, SOLUTION INTRAVENOUS at 09:58

## 2019-06-09 RX ADMIN — PANTOPRAZOLE SODIUM 8 MG/HR: 40 INJECTION, POWDER, FOR SOLUTION INTRAVENOUS at 02:12

## 2019-06-09 RX ADMIN — METOPROLOL TARTRATE 25 MG: 25 TABLET ORAL at 22:17

## 2019-06-09 RX ADMIN — IPRATROPIUM BROMIDE AND ALBUTEROL SULFATE 1 AMPULE: 2.5; .5 SOLUTION RESPIRATORY (INHALATION) at 07:23

## 2019-06-09 RX ADMIN — ACETAMINOPHEN 325 MG: 325 TABLET ORAL at 22:17

## 2019-06-09 RX ADMIN — POTASSIUM CHLORIDE 20 MEQ: 400 INJECTION, SOLUTION INTRAVENOUS at 08:44

## 2019-06-09 RX ADMIN — ASCORBIC ACID, VITAMIN A PALMITATE, CHOLECALCIFEROL, THIAMINE HYDROCHLORIDE, RIBOFLAVIN-5 PHOSPHATE SODIUM, PYRIDOXINE HYDROCHLORIDE, NIACINAMIDE, DEXPANTHENOL, ALPHA-TOCOPHEROL ACETATE, VITAMIN K1, FOLIC ACID, BIOTIN, CYANOCOBALAMIN: 200; 3300; 200; 6; 3.6; 6; 40; 15; 10; 150; 600; 60; 5 INJECTION, SOLUTION INTRAVENOUS at 18:10

## 2019-06-09 RX ADMIN — INSULIN LISPRO 1 UNITS: 100 INJECTION, SOLUTION INTRAVENOUS; SUBCUTANEOUS at 12:56

## 2019-06-09 RX ADMIN — ACETAMINOPHEN 325 MG: 325 TABLET ORAL at 16:55

## 2019-06-09 RX ADMIN — ACETAMINOPHEN 325 MG: 325 TABLET ORAL at 08:43

## 2019-06-09 RX ADMIN — INSULIN LISPRO 1 UNITS: 100 INJECTION, SOLUTION INTRAVENOUS; SUBCUTANEOUS at 17:00

## 2019-06-09 RX ADMIN — INSULIN LISPRO 1 UNITS: 100 INJECTION, SOLUTION INTRAVENOUS; SUBCUTANEOUS at 08:43

## 2019-06-09 RX ADMIN — PANTOPRAZOLE SODIUM 8 MG/HR: 40 INJECTION, POWDER, FOR SOLUTION INTRAVENOUS at 23:20

## 2019-06-09 RX ADMIN — IPRATROPIUM BROMIDE AND ALBUTEROL SULFATE 1 AMPULE: 2.5; .5 SOLUTION RESPIRATORY (INHALATION) at 11:30

## 2019-06-09 RX ADMIN — Medication 10 ML: at 22:18

## 2019-06-09 RX ADMIN — CEFAZOLIN SODIUM 1 G: 1 INJECTION, SOLUTION INTRAVENOUS at 01:00

## 2019-06-09 RX ADMIN — IPRATROPIUM BROMIDE AND ALBUTEROL SULFATE 1 AMPULE: 2.5; .5 SOLUTION RESPIRATORY (INHALATION) at 19:40

## 2019-06-09 RX ADMIN — IPRATROPIUM BROMIDE AND ALBUTEROL SULFATE 1 AMPULE: 2.5; .5 SOLUTION RESPIRATORY (INHALATION) at 15:14

## 2019-06-09 RX ADMIN — CEFAZOLIN SODIUM 1 G: 1 INJECTION, SOLUTION INTRAVENOUS at 16:55

## 2019-06-09 RX ADMIN — NYSTATIN 1500000 UNITS: 100000 SUSPENSION ORAL at 22:17

## 2019-06-09 RX ADMIN — Medication 5 ML: at 22:18

## 2019-06-09 RX ADMIN — Medication 10 ML: at 08:50

## 2019-06-09 RX ADMIN — METOPROLOL TARTRATE 25 MG: 25 TABLET ORAL at 08:42

## 2019-06-09 RX ADMIN — POTASSIUM CHLORIDE 20 MEQ: 400 INJECTION, SOLUTION INTRAVENOUS at 07:40

## 2019-06-09 RX ADMIN — PANTOPRAZOLE SODIUM 8 MG/HR: 40 INJECTION, POWDER, FOR SOLUTION INTRAVENOUS at 11:08

## 2019-06-09 RX ADMIN — Medication 5 ML: at 12:58

## 2019-06-09 RX ADMIN — Medication 5 ML: at 16:55

## 2019-06-09 RX ADMIN — ACETAMINOPHEN 325 MG: 325 TABLET ORAL at 12:54

## 2019-06-09 RX ADMIN — NYSTATIN 1500000 UNITS: 100000 SUSPENSION ORAL at 15:21

## 2019-06-09 ASSESSMENT — PAIN SCALES - GENERAL
PAINLEVEL_OUTOF10: 0
PAINLEVEL_OUTOF10: 0
PAINLEVEL_OUTOF10: 8
PAINLEVEL_OUTOF10: 4
PAINLEVEL_OUTOF10: 0
PAINLEVEL_OUTOF10: 0
PAINLEVEL_OUTOF10: 3
PAINLEVEL_OUTOF10: 7
PAINLEVEL_OUTOF10: 0
PAINLEVEL_OUTOF10: 0

## 2019-06-09 ASSESSMENT — PAIN SCALES - WONG BAKER: WONGBAKER_NUMERICALRESPONSE: 0

## 2019-06-09 NOTE — PROGRESS NOTES
Hospitalist Progress Note  6/9/2019 3:00 PM  Subjective:   Admit Date: 6/1/2019  PCP: Pinky Yoon    Chief Complaint: abdominal pain    Subjective: Continues to complain of pain in her lips and mouth and odynophagia. Some improvement with miracle mouthwash, but remains her primary complaint. History is otherwise unchanged. ROS: 14 point review of systems is negative except as specifically addressed above.     DIET FULL LIQUID;  PN-Adult Premix 5/20 - Standard Electrolytes - Central Line    Intake/Output Summary (Last 24 hours) at 6/9/2019 1500  Last data filed at 6/9/2019 1420  Gross per 24 hour   Intake 2309 ml   Output 4950 ml   Net -2641 ml     Medications:   PN-Adult Premix 5/20 - Standard Electrolytes - Central Line 75 mL/hr at 06/08/19 1823    pantoprozole (PROTONIX) infusion 8 mg/hr (06/09/19 1108)     Current Facility-Administered Medications   Medication Dose Route Frequency Provider Last Rate Last Dose    magic (miracle) mouthwash  5 mL Swish & Swallow 4x Daily PRN Rose Siddiqis, DO   5 mL at 06/09/19 1258    benzonatate (TESSALON) capsule 100 mg  100 mg Oral TID PRN Rose Ester, DO   100 mg at 06/08/19 1402    PN-Adult Premix 5/20 - Standard Electrolytes - Central Line   Intravenous Continuous TPN Viktor Parker MD 75 mL/hr at 06/08/19 1823      ceFAZolin (ANCEF) 1 g in dextrose 5 % 50 mL IVPB (premix)  1 g Intravenous Q8H Joby Gentile MD   Stopped at 06/09/19 1108    acetaminophen (TYLENOL) tablet 325 mg  325 mg Oral 4x Daily Joby Gentile MD   325 mg at 06/09/19 1254    HYDROmorphone (DILAUDID) injection 0.25 mg  0.25 mg Intravenous Q3H PRN Joby Gentile MD   0.25 mg at 06/06/19 1707    metoprolol tartrate (LOPRESSOR) tablet 25 mg  25 mg Oral BID Joby Gentile MD   25 mg at 06/09/19 9206    vitamin D (ERGOCALCIFEROL) capsule 50,000 Units  50,000 Units Oral Weekly Keyla Antunez MD   50,000 Units at 06/06/19 1638    HYDROcodone-acetaminophen (NORCO) 5-325 MG per tablet 1 tablet  1 tablet Oral Q4H PRN Lashawn Azevedo MD   1 tablet at 06/08/19 0405    Or    HYDROcodone-acetaminophen (NORCO) 5-325 MG per tablet 2 tablet  2 tablet Oral Q4H PRN Lashawn Azevedo MD        magnesium sulfate 1 g in dextrose 5% 100 mL IVPB  1 g Intravenous PRN Anitra Huertas MD   Stopped at 06/08/19 1032    potassium chloride 20 mEq/50 mL IVPB (Central Line)  20 mEq Intravenous PRN Anitra Huertas MD 50 mL/hr at 06/09/19 0844 20 mEq at 06/09/19 0844    insulin lispro (HUMALOG) injection vial 0-6 Units  0-6 Units Subcutaneous TID WC Anitra Huertas MD   1 Units at 06/09/19 1256    insulin lispro (HUMALOG) injection vial 0-3 Units  0-3 Units Subcutaneous Nightly Anitra Huertas MD   1 Units at 06/08/19 2211    iron sucrose (VENOFER) injection 100 mg  100 mg Intravenous Q24H Anitra Huertas MD   100 mg at 06/08/19 1443    morphine injection 0.5 mg  0.5 mg Intravenous Q30 Min PRN Anitra Huertas MD   0.5 mg at 06/06/19 1221    fat emulsion 20 % infusion 250 mL  250 mL Intravenous Once per day on Mon Thu Anitra Huertas MD   Stopped at 06/06/19 1735    ipratropium-albuterol (DUONEB) nebulizer solution 1 ampule  1 ampule Inhalation Q4H WA Anitra Huertas MD   1 ampule at 06/09/19 1130    ondansetron (ZOFRAN) injection 4 mg  4 mg Intravenous Q6H PRN Anitra Huertas MD   4 mg at 06/01/19 1212    sodium chloride flush 0.9 % injection 10 mL  10 mL Intravenous 2 times per day Anitra Huertas MD   10 mL at 06/09/19 0850    sodium chloride flush 0.9 % injection 10 mL  10 mL Intravenous PRN Anitra Huertas MD   10 mL at 06/03/19 0004    pantoprazole (PROTONIX) 80 mg in sodium chloride 0.9 % 100 mL infusion  8 mg/hr Intravenous Continuous Anitra Huertas MD 10 mL/hr at 06/09/19 1108 8 mg/hr at 06/09/19 1108        Labs:     Recent Labs     06/07/19  0200 06/08/19  0400 06/09/19  0459   WBC 13.7* 16.6* 16.6*   RBC 2.94* 2.73* 2.91*   HGB 7.7* 7.3* 7.9*   HCT 25.1* 23.4* 25.1*   MCV Perforated duodenal ulcer ([facility] [address]  Active Problems:    Essential hypertension    CKD (chronic kidney disease), baseline stage unknown    JEFF (acute kidney injury) ([facility] [address]    Bowel perforation ([facility] [address] s/p repair now on [id number]    Cheilitis  Resolved Problems:    * No resolved hospital problems. *    Day #9 cefazolin empiric therapy for perforated duodenal ulcer. Per general surgery. Titrate oxygen to maintain SaO2 > 91%. Was on 5 LPM saturating %, turned down to 3 LPM with SaO2 % during examination. Brenda Petyt more likely than non-candidal cheilitis due to symptoms extending to esophagus. Add nystatin to miracle mouthwash swish and swallow. Advance Directive: Full Code    DVT prophylaxis: enoxaparin    Discharge planning:  To [facility]      DO Aniyah Burk Hospitalist

## 2019-06-09 NOTE — PROGRESS NOTES
Patient stated to call  now and tell him how much pain she is in, vs are stable at 166/79 HR 98, o2 sat 92 on 5 lpm, and temp noted 98.4, she stated her legs are killing her and she cant take it anymore, I called hospitalist and updated hospitalist, patient has morphine 0.5 mg iv prn as needed but I plan to watch her respiratory status in order continue to update Electronically signed by Connor Alvarez RN on 6/8/2019 at 10:54 PM

## 2019-06-09 NOTE — PROGRESS NOTES
Nephrology ([address]) Progress Note    Patient:  Tracie Grier  YOB: 1931  Date of Service: 6/9/2019  MRN: [id number]   Acct: [de-identified]   Primary Care Physician: Bryon Smith  Advance Directive: Full Code  Admit Date: 6/1/2019       Hospital Day: 8  Referring Provider: Malina Spangler MD    Patient independently seen and examined, Chart, Consults, Notes, Operative notes, Labs, Cardiology, and Radiology studies reviewed as able. Subjective:  Tracie Grier is a 80 y.o. female  whom we were consulted for acute kidney injury. Patient denies any history of chronic kidney disease. On June 1, she presented to the emergency room with severe abdominal pain. Five days prior to this presentation, she had been complaining of severe sciatica and has taken ibuprofen 400 mg at least 4-5 times a day. For the last 3 days prior to admission, she had been complaining of abdominal pain and the pain was getting worse, so the patient decided to call 911. On arrival to her local hospital, CT scan of the abdomen was done which was consistent with free air in the abdomen. She was transferred to [facility] for emergent laparotomy. Expiratory laparotomy was then done, consistent with perforated duodenal ulcer. Surgery was performed without any complication. Today, no new events noted. Denies nausea or vomiting. No shortness of air. Still complaining of chest wall pain. Allergies:  Patient has no known allergies.     Medicines:  Current Facility-Administered Medications   Medication Dose Route Frequency Provider Last Rate Last Dose    magic (miracle) mouthwash  5 mL Swish & Swallow 4x Daily PRN Lucinda Gearing, DO   5 mL at 06/08/19 2210    benzonatate (TESSALON) capsule 100 mg  100 mg Oral TID PRN Lucinda Gearing, DO   100 mg at 06/08/19 1402    PN-Adult Premix 5/20 - Standard Electrolytes - Central Line   Intravenous Continuous TPN Roselia Philip MD 75 mL/hr at 06/08/19 5704      ceFAZolin (ANCEF) 1 g in dextrose 5 % 50 mL IVPB (premix)  1 g Intravenous Q8H Cassy Mckinney MD   Stopped at 06/09/19 1108    acetaminophen (TYLENOL) tablet 325 mg  325 mg Oral 4x Daily Cassy Mckinney MD   325 mg at 06/09/19 0843    HYDROmorphone (DILAUDID) injection 0.25 mg  0.25 mg Intravenous Q3H PRN Cassy Mckinney MD   0.25 mg at 06/06/19 1707    metoprolol tartrate (LOPRESSOR) tablet 25 mg  25 mg Oral BID Cassy Mckinney MD   25 mg at 06/09/19 2802    vitamin D (ERGOCALCIFEROL) capsule 50,000 Units  50,000 Units Oral Weekly Sajan Wilson MD   50,000 Units at 06/06/19 1638    HYDROcodone-acetaminophen (NORCO) 5-325 MG per tablet 1 tablet  1 tablet Oral Q4H PRN Concepcion Fang MD   1 tablet at 06/08/19 0405    Or    HYDROcodone-acetaminophen (NORCO) 5-325 MG per tablet 2 tablet  2 tablet Oral Q4H PRN Concepcion Fang MD        magnesium sulfate 1 g in dextrose 5% 100 mL IVPB  1 g Intravenous PRN Cassy Mckinney MD   Stopped at 06/08/19 1032    potassium chloride 20 mEq/50 mL IVPB (Central Line)  20 mEq Intravenous PRN Cassy Mckinney MD 50 mL/hr at 06/09/19 0844 20 mEq at 06/09/19 0844    insulin lispro (HUMALOG) injection vial 0-6 Units  0-6 Units Subcutaneous TID WC Cassy Mckinney MD   1 Units at 06/09/19 0843    insulin lispro (HUMALOG) injection vial 0-3 Units  0-3 Units Subcutaneous Nightly Cassy Mckinney MD   1 Units at 06/08/19 2211    iron sucrose (VENOFER) injection 100 mg  100 mg Intravenous Q24H Cassy Mckinney MD   100 mg at 06/08/19 1443    morphine injection 0.5 mg  0.5 mg Intravenous Q30 Min PRN Cassy Mckinney MD   0.5 mg at 06/06/19 1221    fat emulsion 20 % infusion 250 mL  250 mL Intravenous Once per day on Mon Thu Cassy Mckinney MD   Stopped at 06/06/19 5831    ipratropium-albuterol (DUONEB) nebulizer solution 1 ampule  1 ampule Inhalation Q4H WA Cassy Mckinney MD   1 ampule at 06/09/19 1130    ondansetron (ZOFRAN) injection 4 mg  4 mg Intravenous Q6H PRN Pietro Ferguson MD   4 mg at 19 1212    sodium chloride flush 0.9 % injection 10 mL  10 mL Intravenous 2 times per day Pietro Ferguson MD   10 mL at 19 0850    sodium chloride flush 0.9 % injection 10 mL  10 mL Intravenous PRN Pietro Ferguson MD   10 mL at 19 0004    pantoprazole (PROTONIX) 80 mg in sodium chloride 0.9 % 100 mL infusion  8 mg/hr Intravenous Continuous Pietro Ferguson MD 10 mL/hr at 19 1108 8 mg/hr at 19 1108       Past Medical History:  Past Medical History:   Diagnosis Date    Bowel perforation ([facility] [address] s/p repair on      CKD (chronic kidney disease), baseline stage unknown     Essential hypertension        Past Surgical History:  Past Surgical History:   Procedure Laterality Date    LAPAROTOMY EXPLORATORY N/A 2019    CENTRAL LINE PLACEMENT LAPAROTOMY EXPLORATORY GRAM PATCH REPAIR OF DUODENAL ULCER performed by Pietro Ferguson MD at [facility] OR       Family History  Family History   Problem Relation Age of Onset    No Known Problems Mother          of smoke inhalation in fire    Cancer Father          of cancer of prostate    Other Daughter         sciatica    No Known Problems Daughter     No Known Problems Son     No Known Problems Son         has back surgery       Social History  Social History     Socioeconomic History    Marital status:       Spouse name: Not on file    Number of children: 3    Years of education: Not on file    Highest education level: Not on file   Occupational History    Occupation: retired teacher at [employer]   Social Needs    Financial resource strain: Not on file    Food insecurity:     Worry: Not on file     Inability: Not on file   Goalbook needs:     Medical: Not on file     Non-medical: Not on file   Tobacco Use    Smoking status: Never Smoker    Smokeless tobacco: Never Used   Substance and Sexual Activity    Alcohol use: Not Currently    Drug use: Never    Sexual activity: Not on file   Lifestyle    Physical activity:     Days per week: Not on file     Minutes per session: Not on file    Stress: Not on file   Relationships    Social connections:     Talks on phone: Not on file     Gets together: Not on file     Attends Tenriism service: Not on file     Active member of club or organization: Not on file     Attends meetings of clubs or organizations: Not on file     Relationship status: Not on file    Intimate partner violence:     Fear of current or ex partner: Not on file     Emotionally abused: Not on file     Physically abused: Not on file     Forced sexual activity: Not on file   Other Topics Concern    Not on file   Social History Narrative    CODE STATUS: Full Code    HEALTH CARE PROXY: her daughter, Mrs. Abraham Blank: lives in a private home, no stairs inside home, lives alone, has 1 step in to home    AMBULATES: ambulates independantly         Review of Systems:  History obtained from chart review and the patient  General ROS: No fever or chills  Respiratory ROS: No cough, shortness of breath, wheezing  Cardiovascular ROS: No chest pain or palpitations  Gastrointestinal ROS: No abdominal pain or melena  Genito-Urinary ROS: No dysuria or hematuria  Musculoskeletal ROS: No joint pain or swelling         Objective:  Patient Vitals for the past 24 hrs:   BP Temp Temp src Pulse Resp SpO2   06/09/19 1011 (!) 140/58 99 °F (37.2 °C) Temporal 86 16 95 %   06/09/19 0729 -- -- -- -- -- 93 %   06/09/19 0710 (!) 160/75 98.7 °F (37.1 °C) Temporal 95 16 93 %   06/09/19 0403 (!) 162/82 98 °F (36.7 °C) Temporal 96 17 92 %   06/08/19 2242 (!) 166/79 98.4 °F (36.9 °C) Temporal 96 18 90 %   06/08/19 1920 -- -- -- -- -- 90 %   06/08/19 1817 (!) 150/81 97.6 °F (36.4 °C) Temporal 99 15 90 %   06/08/19 1816 -- -- -- -- 15 --   06/08/19 1815 -- -- -- -- -- 90 %   06/08/19 1800 -- -- -- -- -- (!) 89 %   06/08/19 1548 -- -- -- -- -- 95 %   06/08/19 1415 -- -- -- -- -- 98 %   06/08/19 1354 (!) 149/71 98.4 °F (36.9 °C) Temporal 99 16 99 %       Intake/Output Summary (Last 24 hours) at 6/9/2019 1256  Last data filed at 6/9/2019 1256  Gross per 24 hour   Intake 2554 ml   Output 4950 ml   Net -2396 ml     General: awake/alert   Chest:  clear to auscultation bilaterally without respiratory distress  CVS: regular rate and rhythm  Abdominal: soft, nontender, normal bowel sounds  Extremities: no cyanosis, ble edema  Skin: warm and dry without rash    Labs:  BMP:   Recent Labs     06/07/19  0200 06/08/19  0400 06/08/19  1030 06/09/19  0459 06/09/19  1100    140  --  141  --    K 3.3* 3.4* 3.7 3.2* 4.0    100  --  99  --    CO2 29 30*  --  33*  --    BUN 28* 35*  --  30*  --    CREATININE 0.9 0.9  --  0.8  --    CALCIUM 8.8 8.6*  --  8.5*  --      CBC:   Recent Labs     06/07/19  0200 06/08/19  0400 06/09/19  0459   WBC 13.7* 16.6* 16.6*   HGB 7.7* 7.3* 7.9*   HCT 25.1* 23.4* 25.1*   MCV 85.4 85.7 86.3    298 324     LIVER PROFILE:   No results for input(s): AST, ALT, LIPASE, BILIDIR, BILITOT, ALKPHOS in the last 72 hours. Invalid input(s): AMYLASE,  ALB  PT/INR: No results for input(s): PROTIME, INR in the last 72 hours. APTT: No results for input(s): APTT in the last 72 hours. BNP:  No results for input(s): BNP in the last 72 hours. Ionized Calcium:No results for input(s): IONCA in the last 72 hours. Magnesium:  Recent Labs     06/07/19  0200 06/08/19  0400 06/09/19  0459   MG 1.6 1.5* 1.6     Phosphorus:No results for input(s): PHOS in the last 72 hours. HgbA1C: No results for input(s): LABA1C in the last 72 hours. Hepatic:   No results for input(s): ALKPHOS, ALT, AST, PROT, BILITOT, BILIDIR, LABALBU in the last 72 hours. Lactic Acid: No results for input(s): LACTA in the last 72 hours. Troponin: No results for input(s): CKTOTAL, CKMB, TROPONINT in the last 72 hours.   ABGs:   Lab Results   Component Value Date    PHART 7.430 06/07/2019    PO2ART 74.0 06/07/2019    QYU5TYP 44.0 06/07/2019     CRP:  No results for input(s): CRP in the last 72 hours. Sed Rate:  No results for input(s): SEDRATE in the last 72 hours. Culture Results:   Blood Culture Recent: No results for input(s): BC in the last 720 hours. Urine Culture Recent : No results for input(s): LABURIN in the last 720 hours. Radiology reports as per the Radiologist  Radiology: Us Renal Complete    Result Date: 6/2/2019  RENAL ULTRASOUND COMPLETE 6/2/2019 8:45 AM REASON FOR EXAM: Acute kidney injury  COMPARISON: None  TECHNIQUE: Multiple longitudinal and transverse realtime sonographic images of the kidneys and urinary bladder are obtained. FINDINGS: The right kidney measures 11.0 cm. The cortical thickness within the right kidney is 10 mm and 10 mm respectively in the upper and lower pole. The right kidney is normal in size, shape, contour and position. There is an exophytic cyst involving the upper pole of the right kidney measuring 2.3 x 1.9 x 1.8 cm in size. The cortical thickness is normal, with preservation of the corticomedullary differentiation. The central echo complex is compact with no evidence for hydronephrosis. No nephrolithiasis or abnormal perinephric fluid collections are seen. No hydroureter. The left kidney measures 11.5 cm. The cortical thickness within the left kidney is 19 mm  and 17 mm respectively in the upper and lower pole. The left kidney is normal in size, shape, contour and position. There is a cyst involving the upper pole the left kidney measuring 1.9 x 1.9 x 2.6 cm in size. The cortical thickness is normal, with preservation of the corticomedullary differentiation. The central echo complex is compact with no evidence for hydronephrosis. No nephrolithiasis or abnormal perinephric fluid collections are seen. No hydroureter. Scanning through the pelvis demonstrates a Mclain catheter within the bladder. . The wall thickness and contour cannot be assessed.  There is no surrounding ascites. 1. Cortical cysts of the kidneys. Otherwise normal renal ultrasound. 2. Mclain catheter in place within the bladder. . Signed by Dr Nela Lloyd on 6/2/2019 10:38 AM    Xr Chest Portable    Result Date: 6/1/2019  EXAMINATION: Chest one view 6/1/2019 HISTORY: Line placement FINDINGS: Upright frontal projection of the chest demonstrates pulmonary venous hypertension with interstitial edema and small effusions. An NG tube has been advanced with the tip in the distal body of the stomach. A right IJ deep line has been placed with no evidence of complications. 1.. NG tube and right IJ deep line placed with no complications. They are well-positioned. 2. Pulmonary venous hypertension with interstitial edema and small effusions.  Signed by Dr Nela Lloyd on 6/1/2019 10:49 AM       Assessment   Acute kidney injury with unknown baseline  Suspect some chronic kidney disease at baseline  Perforated duodenal ulcer status post exploratory laparotomy  Metabolic acidosis  Acute blood loss anemia with iron deficiency  Vitamin D deficiency  Secondary hyperparathyroidism    Plan:  Added vitamin D as now taking oral meds  Replace potassium today and magnesium IV again as needed  Continue TPN  Transfused packed red blood cells as needed  Iron replacement, given transfusion requirements may benefit from IV therapy  IV Lasix with brisk response    Margie Hicks MD  06/09/19  12:56 PM

## 2019-06-09 NOTE — PROGRESS NOTES
Kaylee Ulrich M.D. FACS  Daily Progress Note    Pt Name: Shoaib [address] Record Number: [id number]  Date of Birth 11/3/1931   Today's Date: 6/9/2019    Chief Complaint:  No chief complaint on file. SUBJECTIVE:     [place] is doing better. Biggest complaint today is a sore mouth. She likes the miracle mouthwash. I will add some nystatin for this as well. Her oxygen saturation is significantly better after 2 doses of Lasix yesterday.     MEDS:     Scheduled Meds:   ceFAZolin  1 g Intravenous Q8H    acetaminophen  325 mg Oral 4x Daily    metoprolol tartrate  25 mg Oral BID    vitamin D  50,000 Units Oral Weekly    insulin lispro  0-6 Units Subcutaneous TID WC    insulin lispro  0-3 Units Subcutaneous Nightly    iron sucrose  100 mg Intravenous Q24H    fat emulsion  250 mL Intravenous Once per day on Mon Thu    ipratropium-albuterol  1 ampule Inhalation Q4H WA    sodium chloride flush  10 mL Intravenous 2 times per day     Continuous Infusions:   PN-Adult Premix 5/20 - Standard Electrolytes - Central Line 75 mL/hr at 06/08/19 1823    pantoprozole (PROTONIX) infusion 8 mg/hr (06/09/19 1108)     PRN Meds:  magic (miracle) mouthwash 5 mL 4x Daily PRN   benzonatate 100 mg TID PRN   HYDROmorphone 0.25 mg Q3H PRN   HYDROcodone 5 mg - acetaminophen 1 tablet Q4H PRN   Or     HYDROcodone 5 mg - acetaminophen 2 tablet Q4H PRN   magnesium sulfate 1 g PRN   potassium chloride 20 mEq PRN   morphine 0.5 mg Q30 Min PRN   ondansetron 4 mg Q6H PRN   sodium chloride flush 10 mL PRN       OBJECTIVE:     Patient Vitals for the past 24 hrs:   BP Temp Temp src Pulse Resp SpO2   06/09/19 1011 (!) 140/58 99 °F (37.2 °C) Temporal 86 16 95 %   06/09/19 0729 -- -- -- -- -- 93 %   06/09/19 0710 (!) 160/75 98.7 °F (37.1 °C) Temporal 95 16 93 %   06/09/19 0403 (!) 162/82 98 °F (36.7 °C) Temporal 96 17 92 %   06/08/19 2242 (!) 166/79 98.4 °F (36.9 °C) Temporal 96 18 90 %   06/08/19 1920 -- -- -- -- -- 90 %   06/08/19 1817 (!) 150/81 97.6 °F (36.4 °C) Temporal 99 15 90 %   06/08/19 1816 -- -- -- -- 15 --   06/08/19 1815 -- -- -- -- -- 90 %   06/08/19 1800 -- -- -- -- -- (!) 89 %   06/08/19 1548 -- -- -- -- -- 95 %   06/08/19 1415 -- -- -- -- -- 98 %   06/08/19 1354 (!) 149/71 98.4 °F (36.9 °C) Temporal 99 16 99 %         Intake/Output Summary (Last 24 hours) at 6/9/2019 1346  Last data filed at 6/9/2019 1256  Gross per 24 hour   Intake 2554 ml   Output 4950 ml   Net -2396 ml       In: 1304 [I.V.:1304]  Out: 4950 [Urine:4400; Drains:550]    I/O last 3 completed shifts: In: 2614 [P. O.:60; I.V.:1708; IV Piggyback:99]  Out: 6125 [Urine:5530; Drains:550]     Date 06/09/19 0000 - 06/09/19 2359   Shift 2403-2277 3285-0883 7849-1734 24 Hour Total   INTAKE   I.V.(mL/kg) 609(8)   609(8)   Shift Total(mL/kg) 609(8)   609(8)   OUTPUT   Urine(mL/kg/hr) 800(1.3) 725  1525   Drains(mL/kg) 0(0)   0(0)   Shift Total(mL/kg) 800(10.5) 725(9.6)  1525(20.1)   Weight (kg) 75.9 75.9 75.9 75.9       Wt Readings from Last 3 Encounters:   06/06/19 167 lb 5 oz (75.9 kg)        Body mass index is 28.72 kg/m².      Diet: DIET FULL LIQUID;  PN-Adult Premix 5/20 - Standard Electrolytes - Central Line    LABS:     CBC: Recent Labs     06/07/19  0200 06/08/19  0400 06/09/19  0459   WBC 13.7* 16.6* 16.6*   RBC 2.94* 2.73* 2.91*   HGB 7.7* 7.3* 7.9*   HCT 25.1* 23.4* 25.1*   MCV 85.4 85.7 86.3   MCH 26.2* 26.7* 27.1   MCHC 30.7* 31.2* 31.5*   RDW 15.3* 15.8* 16.2*    298 324   MPV 9.2* 9.4 9.2*      Last 3 CMP:   Recent Labs     06/07/19  0200 06/08/19  0400 06/08/19  1030 06/09/19  0459 06/09/19  1100    140  --  141  --    K 3.3* 3.4* 3.7 3.2* 4.0    100  --  99  --    CO2 29 30*  --  33*  --    BUN 28* 35*  --  30*  --    CREATININE 0.9 0.9  --  0.8  --    GLUCOSE 129* 170*  --  180*  --    CALCIUM 8.8 8.6*  --  8.5*  --           PHYSICAL EXAM:     CONSTITUTIONAL: Alert, appropriate, no acute distress  SKIN: warm, dry with no rashes or lesions  EYES: Non icteric  CHEST/LUNGS: CTA bilaterally  CARDIOVASCULAR: RRR    ABDOMEN: soft, ND, appropriately TTP, +BS  Incisions: Clean, dry, and intact with no erythema or induration  NEUROLOGIC: CN II-XI grossly intact, no motor or sensory deficits   EXTREMITIES: warm, well perfused, no edema     ASSESSMENT:       1. HD # 8  Patient Active Problem List   Diagnosis    Perforated duodenal ulcer ([address]    Essential hypertension    CKD (chronic kidney disease), baseline stage unknown    JEFF (acute kidney injury) ([address]    Bowel perforation ([address] s/p repair now on [id number]    Cheilitis   2. PLAN:     1. Add Nystatin  2. Harry Solis M.D.  FACS  Electronically signed 6/9/2019 at 1:46 PM

## 2019-06-10 ENCOUNTER — APPOINTMENT (OUTPATIENT)
Dept: GENERAL RADIOLOGY | Age: 84
DRG: 329 | End: 2019-06-10
Attending: SURGERY
Payer: MEDICARE

## 2019-06-10 LAB
ANION GAP SERPL CALCULATED.3IONS-SCNC: 9 MMOL/L (ref 7–19)
BASE EXCESS ARTERIAL: 10.5 MMOL/L (ref -2–2)
BASE EXCESS ARTERIAL: 13.1 MMOL/L (ref -2–2)
BASOPHILS ABSOLUTE: 0 K/UL (ref 0–0.2)
BASOPHILS RELATIVE PERCENT: 0.1 % (ref 0–1)
BUN BLDV-MCNC: 25 MG/DL (ref 8–23)
CALCIUM SERPL-MCNC: 8.4 MG/DL (ref 8.8–10.2)
CARBOXYHEMOGLOBIN ARTERIAL: 2.1 % (ref 0–5)
CARBOXYHEMOGLOBIN ARTERIAL: 2.2 % (ref 0–5)
CHLORIDE BLD-SCNC: 98 MMOL/L (ref 98–111)
CO2: 32 MMOL/L (ref 22–29)
CREAT SERPL-MCNC: 0.7 MG/DL (ref 0.5–0.9)
D DIMER: 6.92 UG/ML FEU (ref 0–0.48)
EOSINOPHILS ABSOLUTE: 0.1 K/UL (ref 0–0.6)
EOSINOPHILS RELATIVE PERCENT: 0.3 % (ref 0–5)
GFR NON-AFRICAN AMERICAN: >60
GLUCOSE BLD-MCNC: 169 MG/DL (ref 70–99)
GLUCOSE BLD-MCNC: 178 MG/DL (ref 74–109)
GLUCOSE BLD-MCNC: 186 MG/DL (ref 70–99)
GLUCOSE BLD-MCNC: 202 MG/DL (ref 70–99)
GLUCOSE BLD-MCNC: 225 MG/DL (ref 70–99)
HCO3 ARTERIAL: 34.3 MMOL/L (ref 22–26)
HCO3 ARTERIAL: 36.7 MMOL/L (ref 22–26)
HCT VFR BLD CALC: 25.1 % (ref 37–47)
HEMOGLOBIN, ART, EXTENDED: 8.3 G/DL (ref 12–16)
HEMOGLOBIN, ART, EXTENDED: 8.5 G/DL (ref 12–16)
HEMOGLOBIN: 7.8 G/DL (ref 12–16)
LACTIC ACID: 1.6 MMOL/L (ref 0.5–1.9)
LYMPHOCYTES ABSOLUTE: 1.1 K/UL (ref 1.1–4.5)
LYMPHOCYTES RELATIVE PERCENT: 5.4 % (ref 20–40)
MCH RBC QN AUTO: 27.1 PG (ref 27–31)
MCHC RBC AUTO-ENTMCNC: 31.1 G/DL (ref 33–37)
MCV RBC AUTO: 87.2 FL (ref 81–99)
METHEMOGLOBIN ARTERIAL: 0.6 %
METHEMOGLOBIN ARTERIAL: 0.9 %
MONOCYTES ABSOLUTE: 1.1 K/UL (ref 0–0.9)
MONOCYTES RELATIVE PERCENT: 5.2 % (ref 0–10)
NEUTROPHILS ABSOLUTE: 17.5 K/UL (ref 1.5–7.5)
NEUTROPHILS RELATIVE PERCENT: 87.2 % (ref 50–65)
O2 CONTENT ARTERIAL: 10.7 ML/DL
O2 CONTENT ARTERIAL: 10.9 ML/DL
O2 SAT, ARTERIAL: 91 %
O2 SAT, ARTERIAL: 91.2 %
O2 THERAPY: ABNORMAL
O2 THERAPY: ABNORMAL
PCO2 ARTERIAL: 42 MMHG (ref 35–45)
PCO2 ARTERIAL: 42 MMHG (ref 35–45)
PDW BLD-RTO: 16.9 % (ref 11.5–14.5)
PERFORMED ON: ABNORMAL
PH ARTERIAL: 7.52 (ref 7.35–7.45)
PH ARTERIAL: 7.55 (ref 7.35–7.45)
PLATELET # BLD: 320 K/UL (ref 130–400)
PMV BLD AUTO: 9.4 FL (ref 9.4–12.3)
PO2 ARTERIAL: 54 MMHG (ref 80–100)
PO2 ARTERIAL: 55 MMHG (ref 80–100)
POTASSIUM SERPL-SCNC: 3.6 MMOL/L (ref 3.5–5)
POTASSIUM, WHOLE BLOOD: 3.1
POTASSIUM, WHOLE BLOOD: 3.3
RBC # BLD: 2.88 M/UL (ref 4.2–5.4)
SODIUM BLD-SCNC: 139 MMOL/L (ref 136–145)
WBC # BLD: 20.1 K/UL (ref 4.8–10.8)

## 2019-06-10 PROCEDURE — 82803 BLOOD GASES ANY COMBINATION: CPT

## 2019-06-10 PROCEDURE — 94762 N-INVAS EAR/PLS OXIMTRY CONT: CPT

## 2019-06-10 PROCEDURE — 6370000000 HC RX 637 (ALT 250 FOR IP): Performed by: SURGERY

## 2019-06-10 PROCEDURE — 94640 AIRWAY INHALATION TREATMENT: CPT

## 2019-06-10 PROCEDURE — 80048 BASIC METABOLIC PNL TOTAL CA: CPT

## 2019-06-10 PROCEDURE — 36600 WITHDRAWAL OF ARTERIAL BLOOD: CPT

## 2019-06-10 PROCEDURE — 87102 FUNGUS ISOLATION CULTURE: CPT

## 2019-06-10 PROCEDURE — 92526 ORAL FUNCTION THERAPY: CPT

## 2019-06-10 PROCEDURE — 6360000002 HC RX W HCPCS: Performed by: INTERNAL MEDICINE

## 2019-06-10 PROCEDURE — 84132 ASSAY OF SERUM POTASSIUM: CPT

## 2019-06-10 PROCEDURE — 2580000003 HC RX 258: Performed by: FAMILY MEDICINE

## 2019-06-10 PROCEDURE — 85379 FIBRIN DEGRADATION QUANT: CPT

## 2019-06-10 PROCEDURE — 1210000000 HC MED SURG R&B

## 2019-06-10 PROCEDURE — 2500000003 HC RX 250 WO HCPCS: Performed by: SURGERY

## 2019-06-10 PROCEDURE — 83605 ASSAY OF LACTIC ACID: CPT

## 2019-06-10 PROCEDURE — 99024 POSTOP FOLLOW-UP VISIT: CPT | Performed by: SURGERY

## 2019-06-10 PROCEDURE — 2580000003 HC RX 258: Performed by: SURGERY

## 2019-06-10 PROCEDURE — 94660 CPAP INITIATION&MGMT: CPT

## 2019-06-10 PROCEDURE — 71045 X-RAY EXAM CHEST 1 VIEW: CPT

## 2019-06-10 PROCEDURE — 82948 REAGENT STRIP/BLOOD GLUCOSE: CPT

## 2019-06-10 PROCEDURE — 99232 SBSQ HOSP IP/OBS MODERATE 35: CPT | Performed by: FAMILY MEDICINE

## 2019-06-10 PROCEDURE — 85025 COMPLETE CBC W/AUTO DIFF WBC: CPT

## 2019-06-10 PROCEDURE — 6360000002 HC RX W HCPCS: Performed by: FAMILY MEDICINE

## 2019-06-10 PROCEDURE — 87070 CULTURE OTHR SPECIMN AEROBIC: CPT

## 2019-06-10 PROCEDURE — 6360000002 HC RX W HCPCS: Performed by: SURGERY

## 2019-06-10 PROCEDURE — 2700000000 HC OXYGEN THERAPY PER DAY

## 2019-06-10 PROCEDURE — C9113 INJ PANTOPRAZOLE SODIUM, VIA: HCPCS | Performed by: SURGERY

## 2019-06-10 RX ORDER — VANCOMYCIN HYDROCHLORIDE 1 G/200ML
1000 INJECTION, SOLUTION INTRAVENOUS EVERY 12 HOURS
Status: DISCONTINUED | OUTPATIENT
Start: 2019-06-10 | End: 2019-06-10 | Stop reason: DRUGHIGH

## 2019-06-10 RX ORDER — FUROSEMIDE 10 MG/ML
20 INJECTION INTRAMUSCULAR; INTRAVENOUS ONCE
Status: COMPLETED | OUTPATIENT
Start: 2019-06-10 | End: 2019-06-10

## 2019-06-10 RX ORDER — DEXTROSE MONOHYDRATE 50 MG/ML
100 INJECTION, SOLUTION INTRAVENOUS PRN
Status: DISCONTINUED | OUTPATIENT
Start: 2019-06-10 | End: 2019-06-12 | Stop reason: HOSPADM

## 2019-06-10 RX ORDER — FUROSEMIDE 10 MG/ML
20 INJECTION INTRAMUSCULAR; INTRAVENOUS 2 TIMES DAILY
Status: DISCONTINUED | OUTPATIENT
Start: 2019-06-10 | End: 2019-06-12 | Stop reason: HOSPADM

## 2019-06-10 RX ORDER — DEXTROSE MONOHYDRATE 25 G/50ML
12.5 INJECTION, SOLUTION INTRAVENOUS PRN
Status: DISCONTINUED | OUTPATIENT
Start: 2019-06-10 | End: 2019-06-12 | Stop reason: HOSPADM

## 2019-06-10 RX ORDER — NICOTINE POLACRILEX 4 MG
15 LOZENGE BUCCAL PRN
Status: DISCONTINUED | OUTPATIENT
Start: 2019-06-10 | End: 2019-06-12 | Stop reason: HOSPADM

## 2019-06-10 RX ADMIN — PANTOPRAZOLE SODIUM 8 MG/HR: 40 INJECTION, POWDER, FOR SOLUTION INTRAVENOUS at 21:19

## 2019-06-10 RX ADMIN — FUROSEMIDE 20 MG: 10 INJECTION, SOLUTION INTRAMUSCULAR; INTRAVENOUS at 10:12

## 2019-06-10 RX ADMIN — IPRATROPIUM BROMIDE AND ALBUTEROL SULFATE 1 AMPULE: 2.5; .5 SOLUTION RESPIRATORY (INHALATION) at 19:07

## 2019-06-10 RX ADMIN — IPRATROPIUM BROMIDE AND ALBUTEROL SULFATE 1 AMPULE: 2.5; .5 SOLUTION RESPIRATORY (INHALATION) at 14:27

## 2019-06-10 RX ADMIN — IPRATROPIUM BROMIDE AND ALBUTEROL SULFATE 1 AMPULE: 2.5; .5 SOLUTION RESPIRATORY (INHALATION) at 07:05

## 2019-06-10 RX ADMIN — ACETAMINOPHEN 325 MG: 325 TABLET ORAL at 13:10

## 2019-06-10 RX ADMIN — PANTOPRAZOLE SODIUM 8 MG/HR: 40 INJECTION, POWDER, FOR SOLUTION INTRAVENOUS at 10:55

## 2019-06-10 RX ADMIN — NYSTATIN 1500000 UNITS: 100000 SUSPENSION ORAL at 19:59

## 2019-06-10 RX ADMIN — CEFAZOLIN SODIUM 1 G: 1 INJECTION, SOLUTION INTRAVENOUS at 01:00

## 2019-06-10 RX ADMIN — INSULIN LISPRO 2 UNITS: 100 INJECTION, SOLUTION INTRAVENOUS; SUBCUTANEOUS at 13:11

## 2019-06-10 RX ADMIN — METOPROLOL TARTRATE 25 MG: 25 TABLET ORAL at 19:58

## 2019-06-10 RX ADMIN — VANCOMYCIN HYDROCHLORIDE 1250 MG: 10 INJECTION, POWDER, LYOPHILIZED, FOR SOLUTION INTRAVENOUS at 17:49

## 2019-06-10 RX ADMIN — IPRATROPIUM BROMIDE AND ALBUTEROL SULFATE 1 AMPULE: 2.5; .5 SOLUTION RESPIRATORY (INHALATION) at 10:46

## 2019-06-10 RX ADMIN — Medication 10 ML: at 22:26

## 2019-06-10 RX ADMIN — FUROSEMIDE 20 MG: 10 INJECTION, SOLUTION INTRAMUSCULAR; INTRAVENOUS at 16:05

## 2019-06-10 RX ADMIN — MEROPENEM 1 G: 1 INJECTION, POWDER, FOR SOLUTION INTRAVENOUS at 16:49

## 2019-06-10 RX ADMIN — INSULIN LISPRO 1 UNITS: 100 INJECTION, SOLUTION INTRAVENOUS; SUBCUTANEOUS at 22:55

## 2019-06-10 RX ADMIN — ACETAMINOPHEN 325 MG: 325 TABLET ORAL at 08:56

## 2019-06-10 RX ADMIN — METOPROLOL TARTRATE 25 MG: 25 TABLET ORAL at 08:56

## 2019-06-10 RX ADMIN — FUROSEMIDE 20 MG: 10 INJECTION, SOLUTION INTRAMUSCULAR; INTRAVENOUS at 19:59

## 2019-06-10 RX ADMIN — NYSTATIN 1500000 UNITS: 100000 SUSPENSION ORAL at 08:56

## 2019-06-10 RX ADMIN — Medication 10 ML: at 08:59

## 2019-06-10 RX ADMIN — CEFAZOLIN SODIUM 1 G: 1 INJECTION, SOLUTION INTRAVENOUS at 08:56

## 2019-06-10 RX ADMIN — I.V. FAT EMULSION 250 ML: 20 EMULSION INTRAVENOUS at 19:58

## 2019-06-10 RX ADMIN — ACETAMINOPHEN 325 MG: 325 TABLET ORAL at 19:59

## 2019-06-10 RX ADMIN — ACETAMINOPHEN 325 MG: 325 TABLET ORAL at 16:50

## 2019-06-10 RX ADMIN — ASCORBIC ACID, VITAMIN A PALMITATE, CHOLECALCIFEROL, THIAMINE HYDROCHLORIDE, RIBOFLAVIN-5 PHOSPHATE SODIUM, PYRIDOXINE HYDROCHLORIDE, NIACINAMIDE, DEXPANTHENOL, ALPHA-TOCOPHEROL ACETATE, VITAMIN K1, FOLIC ACID, BIOTIN, CYANOCOBALAMIN: 200; 3300; 200; 6; 3.6; 6; 40; 15; 10; 150; 600; 60; 5 INJECTION, SOLUTION INTRAVENOUS at 19:58

## 2019-06-10 ASSESSMENT — PAIN SCALES - GENERAL
PAINLEVEL_OUTOF10: 4
PAINLEVEL_OUTOF10: 2
PAINLEVEL_OUTOF10: 2
PAINLEVEL_OUTOF10: 1
PAINLEVEL_OUTOF10: 2
PAINLEVEL_OUTOF10: 5

## 2019-06-10 NOTE — PROGRESS NOTES
Physical Therapy  Name: Royal Boswell  MRN:  [id number]  Date of service:  6/10/2019    Patient having trouble with oxygen sats today, unable to participate with therapy at this time. Will continue to check on patient daily.     Electronically signed by Ty Niño PTA on 6/10/2019 at 4:36 PM

## 2019-06-10 NOTE — PROGRESS NOTES
Nephrology ([facility]) Progress Note    Patient:  Tristen Avila  YOB: 1931  Date of Service: 6/10/2019  MRN: [id number]   Acct: [de-identified]   Primary Care Physician: Maru Kelly  Advance Directive: Full Code  Admit Date: 6/1/2019       Hospital Day: 9  Referring Provider: Tracee Bird MD    Patient independently seen and examined, Chart, Consults, Notes, Operative notes, Labs, Cardiology, and Radiology studies reviewed as able. Subjective:  Tristen Avila is a 80 y.o. female  whom we were consulted for acute kidney injury. Patient denies any history of chronic kidney disease. On June 1, she presented to the emergency room with severe abdominal pain. Five days prior to this presentation, she had been complaining of severe sciatica and has taken ibuprofen 400 mg at least 4-5 times a day. For the last 3 days prior to admission, she had been complaining of abdominal pain and the pain was getting worse, so the patient decided to call 911. On arrival to her local hospital, CT scan of the abdomen was done which was consistent with free air in the abdomen. She was transferred to [facility] for emergent laparotomy. Expiratory laparotomy was then done, consistent with perforated duodenal ulcer. Surgery was performed without any complication. Today, she was dyspneic and appeared puffy. Allergies:  Patient has no known allergies.     Medicines:  Current Facility-Administered Medications   Medication Dose Route Frequency Provider Last Rate Last Dose    glucose (GLUTOSE) 40 % oral gel 15 g  15 g Oral PRN Tracee Bird MD        dextrose 50 % IV solution  12.5 g Intravenous PRN Tracee Bird MD        glucagon (rDNA) injection 1 mg  1 mg Intramuscular PRN Tracee Bird MD        dextrose 5 % solution  100 mL/hr Intravenous PRN Tracee Bird MD        nystatin (MYCOSTATIN) 016994 UNIT/ML suspension 1,500,000 Units  15 mL Oral TID Ngoc Gallagher MD   1,500,000 Units at 06/10/19 0856    PN-Adult Premix 5/20 - Standard Electrolytes - Central Line   Intravenous Continuous TPN Jacquelyn Wan MD 75 mL/hr at 06/09/19 1810      magic (miracle) mouthwash  5 mL Swish & Swallow 4x Daily PRN Isabel Veloz, DO   5 mL at 06/09/19 2218    benzonatate (TESSALON) capsule 100 mg  100 mg Oral TID PRN Isabel Veloz, DO   100 mg at 06/08/19 1402    ceFAZolin (ANCEF) 1 g in dextrose 5 % 50 mL IVPB (premix)  1 g Intravenous Q8H Audra Wyatt  mL/hr at 06/10/19 0856 1 g at 06/10/19 0856    acetaminophen (TYLENOL) tablet 325 mg  325 mg Oral 4x Daily Audra Wyatt MD   325 mg at 06/10/19 0856    HYDROmorphone (DILAUDID) injection 0.25 mg  0.25 mg Intravenous Q3H PRN Audra Wyatt MD   0.25 mg at 06/06/19 1707    metoprolol tartrate (LOPRESSOR) tablet 25 mg  25 mg Oral BID Audra Wyatt MD   25 mg at 06/10/19 0856    vitamin D (ERGOCALCIFEROL) capsule 50,000 Units  50,000 Units Oral Weekly Jeremias Bautista MD   50,000 Units at 06/06/19 1638    HYDROcodone-acetaminophen (NORCO) 5-325 MG per tablet 1 tablet  1 tablet Oral Q4H PRN Thelda Bamberger, MD   1 tablet at 06/08/19 0405    Or    HYDROcodone-acetaminophen (NORCO) 5-325 MG per tablet 2 tablet  2 tablet Oral Q4H PRN Thelda Bamberger, MD        magnesium sulfate 1 g in dextrose 5% 100 mL IVPB  1 g Intravenous PRN Audra Wyatt MD   Stopped at 06/08/19 1032    potassium chloride 20 mEq/50 mL IVPB (Central Line)  20 mEq Intravenous PRN Audra Wyatt MD 50 mL/hr at 06/09/19 0844 20 mEq at 06/09/19 0844    insulin lispro (HUMALOG) injection vial 0-6 Units  0-6 Units Subcutaneous TID WC Audra Wyatt MD   1 Units at 06/09/19 1700    insulin lispro (HUMALOG) injection vial 0-3 Units  0-3 Units Subcutaneous Nightly Audra Wyatt MD   1 Units at 06/09/19 2220    morphine injection 0.5 mg  0.5 mg Intravenous Q30 Min PRN Audra Wyatt MD   0.5 mg at 06/06/19 1221    fat emulsion 20 % insecurity:     Worry: Not on file     Inability: Not on file    Transportation needs:     Medical: Not on file     Non-medical: Not on file   Tobacco Use    Smoking status: Never Smoker    Smokeless tobacco: Never Used   Substance and Sexual Activity    Alcohol use: Not Currently    Drug use: Never    Sexual activity: Not on file   Lifestyle    Physical activity:     Days per week: Not on file     Minutes per session: Not on file    Stress: Not on file   Relationships    Social connections:     Talks on phone: Not on file     Gets together: Not on file     Attends [employer] service: Not on file     Active member of club or organization: Not on file     Attends meetings of clubs or organizations: Not on file     Relationship status: Not on file    Intimate partner violence:     Fear of current or ex partner: Not on file     Emotionally abused: Not on file     Physically abused: Not on file     Forced sexual activity: Not on file   Other Topics Concern    Not on file   Social History Narrative    CODE STATUS: Full Code    HEALTH CARE PROXY: her daughter, Mrs. Guy Child: lives in a private home, no stairs inside home, lives alone, has 1 step in to home    AMBULATES: ambulates independantly         Review of Systems:  History obtained from chart review and the patient  General ROS: No fever or chills  Respiratory ROS: + cough, +shortness of breath, -wheezing  Cardiovascular ROS: No chest pain or palpitations  Gastrointestinal ROS: No abdominal pain or melena  Genito-Urinary ROS: No dysuria or hematuria  Musculoskeletal ROS: No joint pain or swelling         Objective:  Patient Vitals for the past 24 hrs:   BP Temp Temp src Pulse Resp SpO2   06/10/19 0706 -- -- -- -- -- 93 %   06/10/19 0650 (!) 156/80 100.1 °F (37.8 °C) Temporal 104 16 90 %   06/10/19 0210 (!) 150/80 98.6 °F (37 °C) Temporal 100 18 94 %   06/09/19 2313 (!) 152/72 98.9 °F (37.2 °C) Temporal 96 18 92 %   06/09/19 2130 -- -- -- 92 -- --   06/09/19 2007 -- -- -- -- -- 92 %   06/09/19 1942 (!) 158/84 96.9 °F (36.1 °C) Temporal 120 18 92 %   06/09/19 1524 -- -- -- -- -- 92 %   06/09/19 1430 (!) 149/67 98.7 °F (37.1 °C) Temporal 99 16 91 %   06/09/19 1011 (!) 140/58 99 °F (37.2 °C) Temporal 86 16 95 %       Intake/Output Summary (Last 24 hours) at 6/10/2019 0941  Last data filed at 6/10/2019 0910  Gross per 24 hour   Intake 2703 ml   Output 2855 ml   Net -152 ml     General: awake/alert   Chest:  clear to auscultation bilaterally without respiratory distress  CVS: regular rate and rhythm  Abdominal: soft, nontender, normal bowel sounds  Extremities: no cyanosis, ble edema  Skin: warm and dry without rash    Labs:  BMP:   Recent Labs     06/08/19  0400  06/09/19  0459 06/09/19  1100 06/10/19  0500     --  141  --  139   K 3.4*   < > 3.2* 4.0 3.6     --  99  --  98   CO2 30*  --  33*  --  32*   BUN 35*  --  30*  --  25*   CREATININE 0.9  --  0.8  --  0.7   CALCIUM 8.6*  --  8.5*  --  8.4*    < > = values in this interval not displayed. CBC:   Recent Labs     06/08/19  0400 06/09/19  0459 06/10/19  0500   WBC 16.6* 16.6* 20.1*   HGB 7.3* 7.9* 7.8*   HCT 23.4* 25.1* 25.1*   MCV 85.7 86.3 87.2    324 320     LIVER PROFILE:   No results for input(s): AST, ALT, LIPASE, BILIDIR, BILITOT, ALKPHOS in the last 72 hours. Invalid input(s): AMYLASE,  ALB  PT/INR: No results for input(s): PROTIME, INR in the last 72 hours. APTT: No results for input(s): APTT in the last 72 hours. BNP:  No results for input(s): BNP in the last 72 hours. Ionized Calcium:No results for input(s): IONCA in the last 72 hours. Magnesium:  Recent Labs     06/08/19  0400 06/09/19  0459   MG 1.5* 1.6     Phosphorus:No results for input(s): PHOS in the last 72 hours. HgbA1C: No results for input(s): LABA1C in the last 72 hours. Hepatic:   No results for input(s): ALKPHOS, ALT, AST, PROT, BILITOT, BILIDIR, LABALBU in the last 72 hours.   Lactic Acid: No results for input(s): LACTA in the last 72 hours. Troponin: No results for input(s): CKTOTAL, CKMB, TROPONINT in the last 72 hours. ABGs:   Lab Results   Component Value Date    PHART 7.520 06/10/2019    PO2ART 74.0 06/07/2019    SRU7BRQ 44.0 06/07/2019     CRP:  No results for input(s): CRP in the last 72 hours. Sed Rate:  No results for input(s): SEDRATE in the last 72 hours. Culture Results:   Blood Culture Recent: No results for input(s): BC in the last 720 hours. Urine Culture Recent : No results for input(s): LABURIN in the last 720 hours. Radiology reports as per the Radiologist  Radiology: Us Renal Complete    Result Date: 6/2/2019  RENAL ULTRASOUND COMPLETE 6/2/2019 8:45 AM REASON FOR EXAM: Acute kidney injury  COMPARISON: None  TECHNIQUE: Multiple longitudinal and transverse realtime sonographic images of the kidneys and urinary bladder are obtained. FINDINGS: The right kidney measures 11.0 cm. The cortical thickness within the right kidney is 10 mm and 10 mm respectively in the upper and lower pole. The right kidney is normal in size, shape, contour and position. There is an exophytic cyst involving the upper pole of the right kidney measuring 2.3 x 1.9 x 1.8 cm in size. The cortical thickness is normal, with preservation of the corticomedullary differentiation. The central echo complex is compact with no evidence for hydronephrosis. No nephrolithiasis or abnormal perinephric fluid collections are seen. No hydroureter. The left kidney measures 11.5 cm. The cortical thickness within the left kidney is 19 mm  and 17 mm respectively in the upper and lower pole. The left kidney is normal in size, shape, contour and position. There is a cyst involving the upper pole the left kidney measuring 1.9 x 1.9 x 2.6 cm in size. The cortical thickness is normal, with preservation of the corticomedullary differentiation. The central echo complex is compact with no evidence for hydronephrosis.  No nephrolithiasis or abnormal perinephric fluid collections are seen. No hydroureter. Scanning through the pelvis demonstrates a Mclain catheter within the bladder. . The wall thickness and contour cannot be assessed. There is no surrounding ascites. 1. Cortical cysts of the kidneys. Otherwise normal renal ultrasound. 2. Mclain catheter in place within the bladder. . Signed by Dr Patti Schaefer on 6/2/2019 10:38 AM    Xr Chest Portable    Result Date: 6/1/2019  EXAMINATION: Chest one view 6/1/2019 HISTORY: Line placement FINDINGS: Upright frontal projection of the chest demonstrates pulmonary venous hypertension with interstitial edema and small effusions. An NG tube has been advanced with the tip in the distal body of the stomach. A right IJ deep line has been placed with no evidence of complications. 1.. NG tube and right IJ deep line placed with no complications. They are well-positioned. 2. Pulmonary venous hypertension with interstitial edema and small effusions. Signed by Dr Patti Schaefer on 6/1/2019 10:49 AM       Assessment   -Acute kidney injury with unknown baseline  -Suspect some chronic kidney disease at baseline  -Perforated duodenal ulcer status post exploratory laparotomy  -Metabolic acidosis-resolved  -Acute blood loss anemia with iron deficiency  -Vitamin D deficiency  -Secondary hyperparathyroidism    Plan: Will ad diuretics. Renal function is back to normal. Will sign off for now.      Nelida Ross MD  06/10/19  9:41 AM

## 2019-06-10 NOTE — PROGRESS NOTES
Speech Language Pathology  Facility/Department: [facility] SURG SERVICES   CLINICAL BEDSIDE SWALLOW EVALUATION    NAME: [place]  : 11/3/1931  MRN: [id number]    ADMISSION DATE: 2019  ADMITTING DIAGNOSIS: has Perforated duodenal ulcer ([facility] [address] Essential hypertension; CKD (chronic kidney disease), baseline stage unknown; JEFF (acute kidney injury) ([facility] [address] Bowel perforation ([facility] [address] s/p repair now on ; and Cheilitis on their problem list.    Date of Eval: 6/10/2019  Evaluating Therapist: Javier Brian    Current Diet level:  Current Diet : (Full liquids and thin liquids)  Current Liquid Diet : Full    Reason for Referral  [place] was referred for a bedside swallow evaluation to assess the efficiency of her swallow function, identify signs and symptoms of aspiration and make recommendations regarding safe dietary consistencies, effective compensatory strategies, and safe eating environment. Impression  SLP assessed the patient's swallow function. The patient presented with sluggish and mildly decreased laryngeal elevation with puree and thin H20 for adequate airway protection during swallowing. Even so, no outward S/S of penetration/aspiration noted across all food/drink trials. At this time, would recommend continuation of a full liquid diet and thin liquids, secondary to complaints of a sore mouth. Meds whole in applesauce/pudding. TOTAL FEED. Treatment Plan  Requires SLP Intervention: Yes    Recommended Diet and Intervention  Diet Solids Recommendation: (Full liquid)  Liquid Consistency Recommendation: Full  Recommended Form of Meds: Whole in puree as able  Recommendations: Total feed  Therapeutic Interventions: Patient/Family education;Diet tolerance monitoring    Compensatory Swallowing Strategies  Compensatory Swallowing Strategies: Small bites/sips;Upright as possible for all oral intake;Remain upright for 30-45 minutes after meals;Eat/Feed slowly; Total feed    Treatment/Goals  Short-term Goals  Timeframe for Short-term Goals: 1x a day for 2-3 days. Goal 1: Patient will tolerate a full liquid diet and thin liquids with minimal S/S of penetration/aspiration during PO intake. Goal 2: Patient/caregiver education. Goal 3: Re-assessment of swallow function for potential diet upgrade. General  Chart Reviewed: Yes  Behavior/Cognition: Alert; Cooperative;Pleasant mood  O2 Device: Venturi mask  Communication Observation: (Patient 100% intelligible to unfamiliar listners.)  Follows Directions: Simple  Patient Positioning: Upright in bed  Consistencies Administered: Puree; Thin - straw    Oral Phase Dysfunction  Oral Phase: Exceptions  Oral Phase - Comment: The SLP administered trials of puree and thin H20 via straw. Oral prep characterized by functional mastication with puree consistency. Oral transit measured 1-2 seconds in length across all food/drink trials. No observed oral cavity residue noted post swallows. Indicators of Pharyngeal Phase Dysfunction  Pharyngeal Phase: Exceptions  Pharyngeal: The patient presented with sluggish and mildly decreased laryngeal elevation with puree and thin H20 for adequate airway protection during swallowing. Even so, no outward S/S of penetration/aspiration noted across all food/drink trials. At this time, would recommend continuation of a full liquid diet and thin liquids, secondary to complaints of a sore mouth. Meds whole in applesauce/pudding. TOTAL FEED.       Mara Ramirez SLP Graduate Clinician  6/10/2019 1:38 PM

## 2019-06-10 NOTE — PROGRESS NOTES
Procedure Component Value Units Date/Time     Potassium, Whole Blood [[id number]] Collected: 06/10/19 0940      Updated: 06/10/19 0943      Potassium, Whole Blood 3.3     Narrative:       TYRESE Trevizo [id number],  Christelle Delgado RN, 06/10/2019 09:43, by Petty Braun     Blood Gas, Arterial [[id number]] (Abnormal) Collected: 06/10/19 0940     Specimen: Blood gases Updated: 06/10/19 0943      pH, Arterial 7.520      pCO2, Arterial 42.0 mmHg       pO2, Arterial 55.0 mmHg       HCO3, Arterial 34.3 mmol/L       Base Excess, Arterial 10.5 mmol/L       Hemoglobin, Art, Extended 8.3 g/dL       O2 Sat, Arterial 91.0 %       Carboxyhgb, Arterial 2.1 %       Methemoglobin, Arterial 0.6 %       O2 Content, Arterial 10.7 mL/dL       O2 Therapy Unknown     Narrative:       Edwar Flower [id number],  Christelle Delgado RN, 06/10/2019 09:43, by Petty Braun     50% VMASK  RR20 RB POS CANDELARIO

## 2019-06-10 NOTE — PROGRESS NOTES
Pharmacy Note  Vancomycin Consult    Patience Favorite is a 80 y.o. female started on Vancomycin for pneumonia; consult received from Dr. Maximus Soriano to manage therapy. Also receiving the following antibiotics: Cefazolin and Meropenem. Patient Active Problem List   Diagnosis    Perforated duodenal ulcer ([facility] [address]    Essential hypertension    CKD (chronic kidney disease), baseline stage unknown    JEFF (acute kidney injury) ([facility] [address]    Bowel perforation ([facility] [address] s/p repair now on [id number]    Cheilitis       Allergies:  Patient has no known allergies. Temp max: 101.4    Recent Labs     06/09/19  0459 06/10/19  0500   BUN 30* 25*       Recent Labs     06/09/19  0459 06/10/19  0500   CREATININE 0.8 0.7       Recent Labs     06/09/19  0459 06/10/19  0500   WBC 16.6* 20.1*       Intake/Output Summary (Last 24 hours) at 6/10/2019 1619  Last data filed at 6/10/2019 1608  Gross per 24 hour   Intake 1818 ml   Output 4630 ml   Net -2812 ml     Culture Date Source Results   06/10/19 Throat culture Ordered               Ht Readings from Last 1 Encounters:   06/01/19 5' 4\" (1.626 m)        Wt Readings from Last 1 Encounters:   06/06/19 167 lb 5 oz (75.9 kg)       Body mass index is 28.72 kg/m². Estimated Creatinine Clearance: 56 mL/min (based on SCr of 0.7 mg/dL). Assessment/Plan:  Will initiate vancomycin 1250 mg IV every 24 hours. Timing of trough level will be determined based on culture results, renal function, and clinical response. Thank you for the consult. Will continue to follow.     Electronically signed by Edwar Garcia, [address] [place] [address] on 6/10/2019 at 4:19 PM

## 2019-06-10 NOTE — PROGRESS NOTES
Hospitalist Progress Note  6/10/2019 9:08 AM  Subjective:   Admit Date: 6/1/2019  PCP: Sony Yoon    Chief Complaint: abdominal pain    Subjective: Pain in lips and mouth unimproved on nystatin. SaO2 in mid 80%s on 5 LPM today with equivocal waveform and increased respiratory rate. Discussed possibility of LTACH placement with the patient, she says her daughter will need to be involved in the decision. History is otherwise unchanged. ROS: 14 point review of systems is negative except as specifically addressed above.     DIET FULL LIQUID;  PN-Adult Premix 5/20 - Standard Electrolytes - Central Line    Intake/Output Summary (Last 24 hours) at 6/10/2019 0908  Last data filed at 6/10/2019 0826  Gross per 24 hour   Intake 2653 ml   Output 2855 ml   Net -202 ml     Medications:   PN-Adult Premix 5/20 - Standard Electrolytes - Central Line 75 mL/hr at 06/09/19 1810    pantoprozole (PROTONIX) infusion 8 mg/hr (06/09/19 2320)     Current Facility-Administered Medications   Medication Dose Route Frequency Provider Last Rate Last Dose    nystatin (MYCOSTATIN) 220463 UNIT/ML suspension 1,500,000 Units  15 mL Oral TID Kevin Reddy MD   1,500,000 Units at 06/10/19 0856    PN-Adult Premix 5/20 - Standard Electrolytes - Central Line   Intravenous Continuous TPN Kevin Reddy MD 75 mL/hr at 06/09/19 1810      magic (miracle) mouthwash  5 mL Swish & Swallow 4x Daily PRN Devonda Olszewski, DO   5 mL at 06/09/19 2218    benzonatate (TESSALON) capsule 100 mg  100 mg Oral TID PRN Devonda Olszewski, DO   100 mg at 06/08/19 1402    ceFAZolin (ANCEF) 1 g in dextrose 5 % 50 mL IVPB (premix)  1 g Intravenous Q8H Gabbi Quiorz  mL/hr at 06/10/19 0856 1 g at 06/10/19 0856    acetaminophen (TYLENOL) tablet 325 mg  325 mg Oral 4x Daily Gabbi Quiroz MD   325 mg at 06/10/19 0856    HYDROmorphone (DILAUDID) injection 0.25 mg  0.25 mg Intravenous Q3H PRN Gabbi Quiroz MD   0.25 mg at 06/06/19 2871    metoprolol tartrate (LOPRESSOR) tablet 25 mg  25 mg Oral BID Jero Gordillo MD   25 mg at 06/10/19 0856    vitamin D (ERGOCALCIFEROL) capsule 50,000 Units  50,000 Units Oral Weekly Moy Silveira MD   50,000 Units at 06/06/19 1638    HYDROcodone-acetaminophen (NORCO) 5-325 MG per tablet 1 tablet  1 tablet Oral Q4H PRN Lindsey Machado MD   1 tablet at 06/08/19 0405    Or    HYDROcodone-acetaminophen (NORCO) 5-325 MG per tablet 2 tablet  2 tablet Oral Q4H PRN Lindsey Machado MD        magnesium sulfate 1 g in dextrose 5% 100 mL IVPB  1 g Intravenous PRN Jero Gordillo MD   Stopped at 06/08/19 1032    potassium chloride 20 mEq/50 mL IVPB (Central Line)  20 mEq Intravenous PRN Jero Gordillo MD 50 mL/hr at 06/09/19 0844 20 mEq at 06/09/19 0844    insulin lispro (HUMALOG) injection vial 0-6 Units  0-6 Units Subcutaneous TID WC Jero Gordillo MD   1 Units at 06/09/19 1700    insulin lispro (HUMALOG) injection vial 0-3 Units  0-3 Units Subcutaneous Nightly Jero Gordillo MD   1 Units at 06/09/19 2220    morphine injection 0.5 mg  0.5 mg Intravenous Q30 Min PRN Jero Gordillo MD   0.5 mg at 06/06/19 1221    fat emulsion 20 % infusion 250 mL  250 mL Intravenous Once per day on Mon Thu Jero Gordillo MD   Stopped at 06/06/19 1908    ipratropium-albuterol (DUONEB) nebulizer solution 1 ampule  1 ampule Inhalation Q4H WA Jero Gordillo MD   1 ampule at 06/10/19 0705    ondansetron (ZOFRAN) injection 4 mg  4 mg Intravenous Q6H PRN Jero Gordillo MD   4 mg at 06/01/19 1212    sodium chloride flush 0.9 % injection 10 mL  10 mL Intravenous 2 times per day Jero Gordillo MD   10 mL at 06/10/19 0859    sodium chloride flush 0.9 % injection 10 mL  10 mL Intravenous PRN Jero Gordillo MD   10 mL at 06/03/19 0004    pantoprazole (PROTONIX) 80 mg in sodium chloride 0.9 % 100 mL infusion  8 mg/hr Intravenous Continuous Jero Gordillo MD 10 mL/hr at 06/09/19 2320 8 mg/hr at 06/09/19 2320        Labs: Recent Labs     06/08/19  0400 06/09/19  0459 06/10/19  0500   WBC 16.6* 16.6* 20.1*   RBC 2.73* 2.91* 2.88*   HGB 7.3* 7.9* 7.8*   HCT 23.4* 25.1* 25.1*   MCV 85.7 86.3 87.2   MCH 26.7* 27.1 27.1   MCHC 31.2* 31.5* 31.1*    324 320     Recent Labs     06/08/19  0400  06/09/19  0459 06/09/19  1100 06/10/19  0500     --  141  --  139   K 3.4*   < > 3.2* 4.0 3.6   ANIONGAP 10  --  9  --  9     --  99  --  98   CO2 30*  --  33*  --  32*   BUN 35*  --  30*  --  25*   CREATININE 0.9  --  0.8  --  0.7   GLUCOSE 170*  --  180*  --  178*   CALCIUM 8.6*  --  8.5*  --  8.4*    < > = values in this interval not displayed. Recent Labs     06/08/19  0400 06/09/19  0459   MG 1.5* 1.6     No results for input(s): AST, ALT, ALB, BILITOT, ALKPHOS, ALB in the last 72 hours. ABGs:No results for input(s): PH, PO2, PCO2, HCO3, BE, O2SAT in the last 72 hours. Troponin T: No results for input(s): TROPONINI in the last 72 hours. INR: No results for input(s): INR in the last 72 hours. Lactic Acid: No results for input(s): LACTA in the last 72 hours.     Objective:   Vitals: BP (!) 156/80   Pulse 104   Temp 100.1 °F (37.8 °C) (Temporal)   Resp 16   Ht 5' 4\" (1.626 m)   Wt 167 lb 5 oz (75.9 kg)   SpO2 93%   BMI 28.72 kg/m²   24HR INTAKE/OUTPUT:      Intake/Output Summary (Last 24 hours) at 6/10/2019 0908  Last data filed at 6/10/2019 2722  Gross per 24 hour   Intake 2653 ml   Output 2855 ml   Net -202 ml     General appearance: alert and cooperative with exam  HEENT: atraumatic, eyes with clear conjunctiva and normal lids, pupils and irises normal, external ears and nose are normal, lips normal  Neck without masses, lympadenopathy, bruit, thyroid normal  Lungs: no increased work of breathing, diminished breath sounds bilaterally and wheezes bilaterally  Heart: tachycardic but regular  Abdomen: soft, nondistended, bowel sounds present  Extremities: extremities normal, atraumatic, no cyanosis or edema  Neurologic: no focal neurologic deficits, normal sensation, alert and oriented, affect and mood appropriate  Skin: no rashes, nodules    Assessment and Plan:   Principal Problem:    Perforated duodenal ulcer ([facility] [address]  Active Problems:    Essential hypertension    CKD (chronic kidney disease), baseline stage unknown    JEFF (acute kidney injury) ([facility] [address]    Bowel perforation ([facility] [address] s/p repair now on [id number]    Cheilitis  Resolved Problems:    * No resolved hospital problems. *    Day #10 cefazolin empiric therapy for perforated duodenal ulcer. Per general surgery. Titrate oxygen to maintain SaO2 > 91%. If the patient requires Venturi mask, will need ABGs. Throat culture and throat fungal culture with KOH prep. Advance Directive: Full Code    DVT prophylaxis: enoxaparin    Discharge planning:  To SNF      Dearl DO Aniyah Iglesias Hospitalist

## 2019-06-10 NOTE — PROGRESS NOTES
Patient resting well this shift , vs stable and patient seems to have better respiratory status this evening, cont pulse ox noted,   Abdominal incision is d/i, and arcadio drain noted to have no drainage will cont to monitor Electronically signed by Sasha Wallace RN on 6/10/2019 at 12:46 AM

## 2019-06-10 NOTE — PROGRESS NOTES
Blood Gas, Arterial [[id number]] (Abnormal) Collected: 06/10/19 1626     Specimen: Blood gases Updated: 06/10/19 1629      pH, Arterial 7.550      pCO2, Arterial 42.0 mmHg       pO2, Arterial 54.0 mmHg       HCO3, Arterial 36.7 mmol/L       Base Excess, Arterial 13.1 mmol/L       Hemoglobin, Art, Extended 8.5 g/dL       O2 Sat, Arterial 91.2 %       Carboxyhgb, Arterial 2.2 %       Methemoglobin, Arterial 0.9 %       O2 Content, Arterial 10.9 mL/dL       O2 Therapy Unknown     Narrative:       Kell Hodges [id number], Bharati Francois, JING Francois, JING, 06/10/2019 16:29, by Aaron Nguyen     Potassium, Whole Blood [[id number]] Collected: 06/10/19 1626     AT+, RR = 22, L rad, O2 @ 10 lpm, BIPAP 12/6 Updated: 06/10/19 1627      Potassium, Whole Blood 3.1

## 2019-06-11 PROBLEM — J96.01 ACUTE RESPIRATORY FAILURE WITH HYPOXIA (HCC): Status: ACTIVE | Noted: 2019-06-11

## 2019-06-11 LAB
ANION GAP SERPL CALCULATED.3IONS-SCNC: 11 MMOL/L (ref 7–19)
BASOPHILS ABSOLUTE: 0 K/UL (ref 0–0.2)
BASOPHILS RELATIVE PERCENT: 0.1 % (ref 0–1)
BUN BLDV-MCNC: 24 MG/DL (ref 8–23)
CALCIUM SERPL-MCNC: 8.3 MG/DL (ref 8.8–10.2)
CHLORIDE BLD-SCNC: 96 MMOL/L (ref 98–111)
CO2: 32 MMOL/L (ref 22–29)
CREAT SERPL-MCNC: 0.8 MG/DL (ref 0.5–0.9)
EOSINOPHILS ABSOLUTE: 0.1 K/UL (ref 0–0.6)
EOSINOPHILS RELATIVE PERCENT: 0.3 % (ref 0–5)
GFR NON-AFRICAN AMERICAN: >60
GLUCOSE BLD-MCNC: 171 MG/DL (ref 74–109)
GLUCOSE BLD-MCNC: 184 MG/DL (ref 70–99)
GLUCOSE BLD-MCNC: 211 MG/DL (ref 70–99)
GLUCOSE BLD-MCNC: 245 MG/DL (ref 70–99)
HCT VFR BLD CALC: 25.6 % (ref 37–47)
HEMOGLOBIN: 7.8 G/DL (ref 12–16)
LYMPHOCYTES ABSOLUTE: 0.9 K/UL (ref 1.1–4.5)
LYMPHOCYTES RELATIVE PERCENT: 4.2 % (ref 20–40)
MCH RBC QN AUTO: 26.6 PG (ref 27–31)
MCHC RBC AUTO-ENTMCNC: 30.5 G/DL (ref 33–37)
MCV RBC AUTO: 87.4 FL (ref 81–99)
MONOCYTES ABSOLUTE: 0.9 K/UL (ref 0–0.9)
MONOCYTES RELATIVE PERCENT: 4.4 % (ref 0–10)
NEUTROPHILS ABSOLUTE: 19.1 K/UL (ref 1.5–7.5)
NEUTROPHILS RELATIVE PERCENT: 89.6 % (ref 50–65)
PDW BLD-RTO: 17.3 % (ref 11.5–14.5)
PERFORMED ON: ABNORMAL
PLATELET # BLD: 336 K/UL (ref 130–400)
PMV BLD AUTO: 10 FL (ref 9.4–12.3)
POTASSIUM SERPL-SCNC: 3.2 MMOL/L (ref 3.5–5)
RBC # BLD: 2.93 M/UL (ref 4.2–5.4)
SODIUM BLD-SCNC: 139 MMOL/L (ref 136–145)
WBC # BLD: 21.3 K/UL (ref 4.8–10.8)

## 2019-06-11 PROCEDURE — 36592 COLLECT BLOOD FROM PICC: CPT

## 2019-06-11 PROCEDURE — C9113 INJ PANTOPRAZOLE SODIUM, VIA: HCPCS | Performed by: SURGERY

## 2019-06-11 PROCEDURE — 6370000000 HC RX 637 (ALT 250 FOR IP): Performed by: SURGERY

## 2019-06-11 PROCEDURE — 80048 BASIC METABOLIC PNL TOTAL CA: CPT

## 2019-06-11 PROCEDURE — 2580000003 HC RX 258: Performed by: SURGERY

## 2019-06-11 PROCEDURE — 2500000003 HC RX 250 WO HCPCS: Performed by: SURGERY

## 2019-06-11 PROCEDURE — 1210000000 HC MED SURG R&B

## 2019-06-11 PROCEDURE — 6360000002 HC RX W HCPCS: Performed by: SURGERY

## 2019-06-11 PROCEDURE — 94640 AIRWAY INHALATION TREATMENT: CPT

## 2019-06-11 PROCEDURE — 99024 POSTOP FOLLOW-UP VISIT: CPT | Performed by: SURGERY

## 2019-06-11 PROCEDURE — 94762 N-INVAS EAR/PLS OXIMTRY CONT: CPT

## 2019-06-11 PROCEDURE — 94660 CPAP INITIATION&MGMT: CPT

## 2019-06-11 PROCEDURE — 85025 COMPLETE CBC W/AUTO DIFF WBC: CPT

## 2019-06-11 PROCEDURE — 6360000002 HC RX W HCPCS: Performed by: INTERNAL MEDICINE

## 2019-06-11 PROCEDURE — 2580000003 HC RX 258: Performed by: FAMILY MEDICINE

## 2019-06-11 PROCEDURE — 82948 REAGENT STRIP/BLOOD GLUCOSE: CPT

## 2019-06-11 PROCEDURE — 6360000002 HC RX W HCPCS: Performed by: FAMILY MEDICINE

## 2019-06-11 PROCEDURE — 2700000000 HC OXYGEN THERAPY PER DAY

## 2019-06-11 RX ADMIN — Medication 10 ML: at 20:52

## 2019-06-11 RX ADMIN — MEROPENEM 1 G: 1 INJECTION, POWDER, FOR SOLUTION INTRAVENOUS at 10:17

## 2019-06-11 RX ADMIN — NYSTATIN 500000 UNITS: 100000 SUSPENSION ORAL at 09:55

## 2019-06-11 RX ADMIN — IPRATROPIUM BROMIDE AND ALBUTEROL SULFATE 1 AMPULE: 2.5; .5 SOLUTION RESPIRATORY (INHALATION) at 11:25

## 2019-06-11 RX ADMIN — IPRATROPIUM BROMIDE AND ALBUTEROL SULFATE 1 AMPULE: 2.5; .5 SOLUTION RESPIRATORY (INHALATION) at 07:26

## 2019-06-11 RX ADMIN — FUROSEMIDE 20 MG: 10 INJECTION, SOLUTION INTRAMUSCULAR; INTRAVENOUS at 10:22

## 2019-06-11 RX ADMIN — VANCOMYCIN HYDROCHLORIDE 1250 MG: 10 INJECTION, POWDER, LYOPHILIZED, FOR SOLUTION INTRAVENOUS at 18:21

## 2019-06-11 RX ADMIN — IPRATROPIUM BROMIDE AND ALBUTEROL SULFATE 1 AMPULE: 2.5; .5 SOLUTION RESPIRATORY (INHALATION) at 19:29

## 2019-06-11 RX ADMIN — IPRATROPIUM BROMIDE AND ALBUTEROL SULFATE 1 AMPULE: 2.5; .5 SOLUTION RESPIRATORY (INHALATION) at 15:29

## 2019-06-11 RX ADMIN — METOPROLOL TARTRATE 25 MG: 25 TABLET ORAL at 10:18

## 2019-06-11 RX ADMIN — ACETAMINOPHEN 325 MG: 325 TABLET ORAL at 18:21

## 2019-06-11 RX ADMIN — PANTOPRAZOLE SODIUM 8 MG/HR: 40 INJECTION, POWDER, FOR SOLUTION INTRAVENOUS at 06:48

## 2019-06-11 RX ADMIN — INSULIN LISPRO 1 UNITS: 100 INJECTION, SOLUTION INTRAVENOUS; SUBCUTANEOUS at 21:09

## 2019-06-11 RX ADMIN — MEROPENEM 1 G: 1 INJECTION, POWDER, FOR SOLUTION INTRAVENOUS at 19:54

## 2019-06-11 RX ADMIN — FUROSEMIDE 20 MG: 10 INJECTION, SOLUTION INTRAMUSCULAR; INTRAVENOUS at 18:21

## 2019-06-11 RX ADMIN — Medication 5 ML: at 13:40

## 2019-06-11 RX ADMIN — ACETAMINOPHEN 325 MG: 325 TABLET ORAL at 20:51

## 2019-06-11 RX ADMIN — METOPROLOL TARTRATE 25 MG: 25 TABLET ORAL at 20:52

## 2019-06-11 RX ADMIN — ASCORBIC ACID, VITAMIN A PALMITATE, CHOLECALCIFEROL, THIAMINE HYDROCHLORIDE, RIBOFLAVIN-5 PHOSPHATE SODIUM, PYRIDOXINE HYDROCHLORIDE, NIACINAMIDE, DEXPANTHENOL, ALPHA-TOCOPHEROL ACETATE, VITAMIN K1, FOLIC ACID, BIOTIN, CYANOCOBALAMIN: 200; 3300; 200; 6; 3.6; 6; 40; 15; 10; 150; 600; 60; 5 INJECTION, SOLUTION INTRAVENOUS at 20:10

## 2019-06-11 RX ADMIN — ACETAMINOPHEN 325 MG: 325 TABLET ORAL at 10:19

## 2019-06-11 RX ADMIN — PANTOPRAZOLE SODIUM 8 MG/HR: 40 INJECTION, POWDER, FOR SOLUTION INTRAVENOUS at 18:21

## 2019-06-11 RX ADMIN — NYSTATIN 1500000 UNITS: 100000 SUSPENSION ORAL at 20:51

## 2019-06-11 RX ADMIN — MEROPENEM 1 G: 1 INJECTION, POWDER, FOR SOLUTION INTRAVENOUS at 01:52

## 2019-06-11 RX ADMIN — POTASSIUM CHLORIDE 20 MEQ: 400 INJECTION, SOLUTION INTRAVENOUS at 06:35

## 2019-06-11 RX ADMIN — POTASSIUM CHLORIDE 20 MEQ: 400 INJECTION, SOLUTION INTRAVENOUS at 07:35

## 2019-06-11 RX ADMIN — Medication 10 ML: at 10:18

## 2019-06-11 ASSESSMENT — PAIN SCALES - GENERAL
PAINLEVEL_OUTOF10: 6
PAINLEVEL_OUTOF10: 7

## 2019-06-11 NOTE — CARE COORDINATION
Pt is unable to work with PT/OT to evaluate her level of function because of her not feeling well. SW will continue to monitor Pt status and update as needed.  Electronically signed by José Luis Azevedo on 6/11/2019 at 11:00 AM

## 2019-06-11 NOTE — PROGRESS NOTES
Hospitalist Progress Note  6/11/2019 1:09 PM  Subjective:   Admit Date: 6/1/2019  PCP: Kera Yoon    Chief Complaint: abdominal pain    Subjective: Pain in lips and mouth and shortness of breath unimproved. Chest X-ray showed suggestion of developing pneumonia along with vascular congestion and thus she was started on vancomycin and meropenem and placed on biPAP with acute respiratory failure unable to be managed on Venturi mask. History is otherwise unchanged. ROS: 14 point review of systems is negative except as specifically addressed above.     DIET FULL LIQUID;  Dietary Nutrition Supplements: Low Calorie High Protein Supplement  PN-Adult Premix 5/20 - Standard Electrolytes - Central Line    Intake/Output Summary (Last 24 hours) at 6/11/2019 1309  Last data filed at 6/11/2019 1056  Gross per 24 hour   Intake 3630.7 ml   Output 5705 ml   Net -2074.3 ml     Medications:   dextrose      PN-Adult Premix 5/20 - Standard Electrolytes - Central Line 75 mL/hr at 06/10/19 1958    pantoprozole (PROTONIX) infusion 8 mg/hr (06/11/19 1878)     Current Facility-Administered Medications   Medication Dose Route Frequency Provider Last Rate Last Dose    glucose (GLUTOSE) 40 % oral gel 15 g  15 g Oral PRN Sherren Ducking, MD        dextrose 50 % IV solution  12.5 g Intravenous PRN Sherren Ducking, MD        glucagon (rDNA) injection 1 mg  1 mg Intramuscular PRN Sherren Ducking, MD        dextrose 5 % solution  100 mL/hr Intravenous PRN Sherren Ducking, MD        furosemide (LASIX) injection 20 mg  20 mg Intravenous BID Ozzie Morales MD   20 mg at 06/11/19 1022    meropenem (MERREM) 1 g in sodium chloride 0.9 % 100 mL IVPB (mini-bag)  1 g Intravenous Q8H Rosalio Kmi, DO   Stopped at 06/11/19 1044    vancomycin (VANCOCIN) 1,250 mg in dextrose 5 % 250 mL IVPB  1,250 mg Intravenous Q24H Rosalio Kim, DO   Stopped at 06/10/19 1919    vancomycin (VANCOCIN) intermittent dosing (placeholder)   Other RX Placeholder Jeraline Minter, DO        PN-Adult Premix 5/20 - Standard Electrolytes - Central Line   Intravenous Continuous TPN Lyubov Spangler MD 75 mL/hr at 06/10/19 1958      nystatin (MYCOSTATIN) [id number] UNIT/ML suspension 1,500,000 Units  15 mL Oral TID Tram Mcconnell MD   500,000 Units at 06/11/19 0955    magic (miracle) mouthwash  5 mL Swish & Swallow 4x Daily PRN Nilda Fuchs DO   5 mL at 06/09/19 2218    benzonatate (TESSALON) capsule 100 mg  100 mg Oral TID PRN Nilda Fuchs DO   100 mg at 06/08/19 1402    acetaminophen (TYLENOL) tablet 325 mg  325 mg Oral 4x Daily Lyubov Spangler MD   325 mg at 06/11/19 1019    HYDROmorphone (DILAUDID) injection 0.25 mg  0.25 mg Intravenous Q3H PRN Lyubov Spangler MD   0.25 mg at 06/06/19 1707    metoprolol tartrate (LOPRESSOR) tablet 25 mg  25 mg Oral BID Lyubov Spangler MD   25 mg at 06/11/19 1018    vitamin D (ERGOCALCIFEROL) capsule 50,000 Units  50,000 Units Oral Weekly Deann Taylor MD   50,000 Units at 06/06/19 1638    HYDROcodone-acetaminophen (NORCO) 5-325 MG per tablet 1 tablet  1 tablet Oral Q4H PRN Sheree Morocho MD   1 tablet at 06/08/19 0405    Or    HYDROcodone-acetaminophen (NORCO) 5-325 MG per tablet 2 tablet  2 tablet Oral Q4H PRN Sheree Morocho MD        magnesium sulfate 1 g in dextrose 5% 100 mL IVPB  1 g Intravenous PRN Lyubov Spangler MD   Stopped at 06/08/19 1032    potassium chloride 20 mEq/50 mL IVPB (Central Line)  20 mEq Intravenous PRN Lyubov Spangler MD 50 mL/hr at 06/11/19 0735 20 mEq at 06/11/19 0735    insulin lispro (HUMALOG) injection vial 0-6 Units  0-6 Units Subcutaneous TID WC Lyubov Spangler MD   2 Units at 06/10/19 1311    insulin lispro (HUMALOG) injection vial 0-3 Units  0-3 Units Subcutaneous Nightly Lyubov Spangler MD   1 Units at 06/10/19 2255    morphine injection 0.5 mg  0.5 mg Intravenous Q30 Min PRN Lyubov Spangler MD   0.5 mg at 06/06/19 1221    fat emulsion 20 % infusion 250 mL  250 mL Intravenous (Temporal)   Resp 20   Ht 5' 4\" (1.626 m)   Wt 165 lb 11.2 oz (75.2 kg)   SpO2 (!) 89%   BMI 28.44 kg/m²   24HR INTAKE/OUTPUT:      Intake/Output Summary (Last 24 hours) at 6/11/2019 1309  Last data filed at 6/11/2019 1056  Gross per 24 hour   Intake 3630.7 ml   Output 5705 ml   Net -2074.3 ml     General appearance: alert and cooperative with exam  HEENT: atraumatic, eyes with clear conjunctiva and normal lids, pupils and irises normal, external ears and nose are normal, lips normal  Neck without masses, lympadenopathy, bruit, thyroid normal  Lungs: no increased work of breathing, diminished breath sounds bilaterally and wheezes bilaterally  Heart: tachycardic but regular  Abdomen: soft, nondistended, bowel sounds present  Extremities: extremities normal, atraumatic, no cyanosis or edema  Neurologic: no focal neurologic deficits, normal sensation, alert and oriented, affect and mood appropriate  Skin: no rashes, nodules    Assessment and Plan:   Principal Problem:    Perforated duodenal ulcer ([address]  Active Problems:    Essential hypertension    CKD (chronic kidney disease), baseline stage unknown    JEFF (acute kidney injury) ([address]    Bowel perforation ([address] s/p repair now on [id number]    Cheilitis    Acute respiratory failure with hypoxia (HCC)  Resolved Problems:    * No resolved hospital problems. *    Day #2 vancomycin and meropenem empiric therapy for healthcare-associated pneumonia. Previously received 10 days of cefazolin empiric therapy for perforated duodenal ulcer. On biPAP to maintain SaO2 > 91%. Throat culture and throat fungal culture with KOH prep. Continue magic mouthwash and nystatin.     Advance Directive: Full Code    DVT prophylaxis: enoxaparin    [facility]

## 2019-06-11 NOTE — PROGRESS NOTES
S: Pt. currently on CPAP and will respond to questioning. Still having Bms and flatus per nursing staff. CXR yesterday revealed:   Pulmonary edema and small effusions without significant change. Persistent bibasilar opacities could be superimposed pneumonia and/or   atelectasis     O: BP (!) 157/86   Pulse 113   Temp 100.5 °F (38.1 °C) (Axillary)   Resp 20   Ht 5' 4\" (1.626 m)   Wt 165 lb 11.2 oz (75.2 kg)   SpO2 (!) 85% Comment: rn notified  BMI 28.44 kg/m²    Lungs: Has bilateral course rhonchi to ausc., Abd: more distended due to CPAP, soft with no tenderness. Wounds stable. Lab Results   Component Value Date    WBC 21.3 (H) 06/11/2019    HGB 7.8 (L) 06/11/2019    HCT 25.6 (L) 06/11/2019    MCV 87.4 06/11/2019     06/11/2019     Lab Results   Component Value Date     06/11/2019    K 3.2 06/11/2019    K 4.5 06/01/2019    CL 96 06/11/2019    CO2 32 06/11/2019    BUN 24 06/11/2019    CREATININE 0.8 06/11/2019    GLUCOSE 171 06/11/2019    CALCIUM 8.3 06/11/2019        A: Pulmonary edema with possible bilateral infiltrates    P: CPT. ? discuss DNR status with family. Electronically signed by Crystal Claire PA-C on 6/11/19 at 10:52 AM    I have seen Ms. Mita Greenfield and examined her. I concur with Mr Mendez's note as recorded above. Appears comfortable in bed. BiPAP in place. Breathing easily. Coarse breath sounds bilaterally. Abdomen is soft. Minimal tenderness. Incision clean and dry. NEHAL with serous output. Pulmonary issues dominate at present. Continue gentle diuresis as well as antibiotics for possible pneumonia. BiPAP support as needed. Continue parenteral nutrition. It may be a while before she can eat enough to meet her needs. Discussed her current status with her daughter yesterday evening.     Electronically signed by Mervyn Moritz, MD on 6/11/2019 at 1:09 PM

## 2019-06-11 NOTE — PROGRESS NOTES
Physical Therapy  Gray Arias  [id number]     06/11/19 1004   General   Missed reason Patient declined;Patient ill   Subjective   Subjective Pt refused and stated her mouth is burning.    Electronically signed by Elvia Phan PTA on 6/11/2019 at 10:06 AM

## 2019-06-11 NOTE — PROGRESS NOTES
Speech Language Pathology  {SLP ALL NOTES:[id number]  Pt was unable to tolerate treatment this date due to decline in pulmo alissa status. Pt's O2 stats drop to 82 without out BiPAP. Pt's mouth continues to be very sore. Per nursing, pt was given a few small sips at lunch, but she didn't eat much due to mouth pain.

## 2019-06-12 ENCOUNTER — HOSPITAL ENCOUNTER (OUTPATIENT)
Facility: HOSPITAL | Age: 84
Discharge: ANOTHER HOSPITAL | End: 2019-06-19
Attending: INTERNAL MEDICINE | Admitting: INTERNAL MEDICINE

## 2019-06-12 ENCOUNTER — APPOINTMENT (OUTPATIENT)
Dept: GENERAL RADIOLOGY | Facility: HOSPITAL | Age: 84
End: 2019-06-12

## 2019-06-12 VITALS
OXYGEN SATURATION: 97 % | BODY MASS INDEX: 28.29 KG/M2 | HEIGHT: 64 IN | HEART RATE: 105 BPM | RESPIRATION RATE: 20 BRPM | WEIGHT: 165.7 LBS | SYSTOLIC BLOOD PRESSURE: 142 MMHG | TEMPERATURE: 99.1 F | DIASTOLIC BLOOD PRESSURE: 72 MMHG

## 2019-06-12 PROBLEM — K26.5 PERFORATED DUODENAL ULCER (HCC): Status: RESOLVED | Noted: 2019-06-01 | Resolved: 2019-06-12

## 2019-06-12 PROBLEM — K63.1 BOWEL PERFORATION (HCC): Status: RESOLVED | Noted: 2019-06-01 | Resolved: 2019-06-12

## 2019-06-12 LAB
ANION GAP SERPL CALCULATED.3IONS-SCNC: 9 MMOL/L (ref 7–19)
BASOPHILS ABSOLUTE: 0 K/UL (ref 0–0.2)
BASOPHILS RELATIVE PERCENT: 0.1 % (ref 0–1)
BUN BLDV-MCNC: 28 MG/DL (ref 8–23)
CALCIUM SERPL-MCNC: 8.2 MG/DL (ref 8.8–10.2)
CHLORIDE BLD-SCNC: 99 MMOL/L (ref 98–111)
CO2: 34 MMOL/L (ref 22–29)
CREAT SERPL-MCNC: 0.8 MG/DL (ref 0.5–0.9)
EOSINOPHILS ABSOLUTE: 0.1 K/UL (ref 0–0.6)
EOSINOPHILS RELATIVE PERCENT: 0.7 % (ref 0–5)
GFR NON-AFRICAN AMERICAN: >60
GLUCOSE BLD-MCNC: 139 MG/DL (ref 74–109)
GLUCOSE BLD-MCNC: 164 MG/DL (ref 70–99)
GLUCOSE BLD-MCNC: 231 MG/DL (ref 70–99)
GLUCOSE BLDC GLUCOMTR-MCNC: 125 MG/DL (ref 70–130)
HCT VFR BLD CALC: 24.6 % (ref 37–47)
HEMOGLOBIN: 7.6 G/DL (ref 12–16)
LYMPHOCYTES ABSOLUTE: 0.9 K/UL (ref 1.1–4.5)
LYMPHOCYTES RELATIVE PERCENT: 5.7 % (ref 20–40)
MCH RBC QN AUTO: 27.4 PG (ref 27–31)
MCHC RBC AUTO-ENTMCNC: 30.9 G/DL (ref 33–37)
MCV RBC AUTO: 88.8 FL (ref 81–99)
MONOCYTES ABSOLUTE: 0.8 K/UL (ref 0–0.9)
MONOCYTES RELATIVE PERCENT: 5.1 % (ref 0–10)
NEUTROPHILS ABSOLUTE: 14 K/UL (ref 1.5–7.5)
NEUTROPHILS RELATIVE PERCENT: 87.4 % (ref 50–65)
ORGANISM: ABNORMAL
PDW BLD-RTO: 17.2 % (ref 11.5–14.5)
PERFORMED ON: ABNORMAL
PERFORMED ON: ABNORMAL
PLATELET # BLD: 350 K/UL (ref 130–400)
PMV BLD AUTO: 10.3 FL (ref 9.4–12.3)
POTASSIUM SERPL-SCNC: 3.2 MMOL/L (ref 3.5–5)
POTASSIUM SERPL-SCNC: 3.8 MMOL/L (ref 3.5–5)
RBC # BLD: 2.77 M/UL (ref 4.2–5.4)
SODIUM BLD-SCNC: 142 MMOL/L (ref 136–145)
THROAT CULTURE: ABNORMAL
THROAT CULTURE: ABNORMAL
WBC # BLD: 16 K/UL (ref 4.8–10.8)

## 2019-06-12 PROCEDURE — 94640 AIRWAY INHALATION TREATMENT: CPT

## 2019-06-12 PROCEDURE — 85025 COMPLETE CBC W/AUTO DIFF WBC: CPT

## 2019-06-12 PROCEDURE — 25010000002 VANCOMYCIN: Performed by: INTERNAL MEDICINE

## 2019-06-12 PROCEDURE — 6370000000 HC RX 637 (ALT 250 FOR IP): Performed by: SURGERY

## 2019-06-12 PROCEDURE — C9113 INJ PANTOPRAZOLE SODIUM, VIA: HCPCS | Performed by: SURGERY

## 2019-06-12 PROCEDURE — 94762 N-INVAS EAR/PLS OXIMTRY CONT: CPT

## 2019-06-12 PROCEDURE — 6360000002 HC RX W HCPCS: Performed by: INTERNAL MEDICINE

## 2019-06-12 PROCEDURE — 94761 N-INVAS EAR/PLS OXIMETRY MLT: CPT

## 2019-06-12 PROCEDURE — 25010000002 FUROSEMIDE PER 20 MG: Performed by: INTERNAL MEDICINE

## 2019-06-12 PROCEDURE — 94660 CPAP INITIATION&MGMT: CPT

## 2019-06-12 PROCEDURE — 71045 X-RAY EXAM CHEST 1 VIEW: CPT

## 2019-06-12 PROCEDURE — 6360000002 HC RX W HCPCS: Performed by: SURGERY

## 2019-06-12 PROCEDURE — 2580000003 HC RX 258: Performed by: FAMILY MEDICINE

## 2019-06-12 PROCEDURE — 82948 REAGENT STRIP/BLOOD GLUCOSE: CPT

## 2019-06-12 PROCEDURE — 36592 COLLECT BLOOD FROM PICC: CPT

## 2019-06-12 PROCEDURE — 6360000002 HC RX W HCPCS: Performed by: FAMILY MEDICINE

## 2019-06-12 PROCEDURE — 2580000003 HC RX 258: Performed by: SURGERY

## 2019-06-12 PROCEDURE — 82962 GLUCOSE BLOOD TEST: CPT

## 2019-06-12 PROCEDURE — 80048 BASIC METABOLIC PNL TOTAL CA: CPT

## 2019-06-12 PROCEDURE — 99232 SBSQ HOSP IP/OBS MODERATE 35: CPT | Performed by: FAMILY MEDICINE

## 2019-06-12 PROCEDURE — 25010000002 MEROPENEM: Performed by: INTERNAL MEDICINE

## 2019-06-12 PROCEDURE — 2700000000 HC OXYGEN THERAPY PER DAY

## 2019-06-12 PROCEDURE — 84132 ASSAY OF SERUM POTASSIUM: CPT

## 2019-06-12 RX ORDER — HYDROMORPHONE HYDROCHLORIDE 1 MG/ML
0.25 INJECTION, SOLUTION INTRAMUSCULAR; INTRAVENOUS; SUBCUTANEOUS
Status: DISCONTINUED | OUTPATIENT
Start: 2019-06-12 | End: 2019-06-18

## 2019-06-12 RX ORDER — SODIUM CHLORIDE 0.9 % (FLUSH) 0.9 %
10 SYRINGE (ML) INJECTION AS NEEDED
Status: DISCONTINUED | OUTPATIENT
Start: 2019-06-12 | End: 2019-06-19 | Stop reason: HOSPADM

## 2019-06-12 RX ORDER — LOSARTAN POTASSIUM 25 MG/1
25 TABLET ORAL
Status: DISCONTINUED | OUTPATIENT
Start: 2019-06-13 | End: 2019-06-19 | Stop reason: HOSPADM

## 2019-06-12 RX ORDER — HYDROCODONE BITARTRATE AND ACETAMINOPHEN 5; 325 MG/1; MG/1
1 TABLET ORAL EVERY 4 HOURS PRN
Status: DISCONTINUED | OUTPATIENT
Start: 2019-06-12 | End: 2019-06-19 | Stop reason: HOSPADM

## 2019-06-12 RX ORDER — HYDROCODONE BITARTRATE AND ACETAMINOPHEN 5; 325 MG/1; MG/1
2 TABLET ORAL EVERY 4 HOURS PRN
Status: DISCONTINUED | OUTPATIENT
Start: 2019-06-12 | End: 2019-06-19 | Stop reason: HOSPADM

## 2019-06-12 RX ORDER — MORPHINE SULFATE 2 MG/ML
0.5 INJECTION, SOLUTION INTRAMUSCULAR; INTRAVENOUS
Status: DISCONTINUED | OUTPATIENT
Start: 2019-06-12 | End: 2019-06-12 | Stop reason: SDUPTHER

## 2019-06-12 RX ORDER — ONDANSETRON 2 MG/ML
4 INJECTION INTRAMUSCULAR; INTRAVENOUS EVERY 6 HOURS PRN
Status: DISCONTINUED | OUTPATIENT
Start: 2019-06-12 | End: 2019-06-19 | Stop reason: HOSPADM

## 2019-06-12 RX ORDER — ERGOCALCIFEROL 1.25 MG/1
50000 CAPSULE ORAL
Status: DISCONTINUED | OUTPATIENT
Start: 2019-06-13 | End: 2019-06-19 | Stop reason: HOSPADM

## 2019-06-12 RX ORDER — IPRATROPIUM BROMIDE AND ALBUTEROL SULFATE 2.5; .5 MG/3ML; MG/3ML
3 SOLUTION RESPIRATORY (INHALATION)
Status: DISCONTINUED | OUTPATIENT
Start: 2019-06-12 | End: 2019-06-19 | Stop reason: HOSPADM

## 2019-06-12 RX ORDER — FUROSEMIDE 10 MG/ML
20 INJECTION INTRAMUSCULAR; INTRAVENOUS
Status: DISCONTINUED | OUTPATIENT
Start: 2019-06-12 | End: 2019-06-19 | Stop reason: HOSPADM

## 2019-06-12 RX ORDER — ACETAMINOPHEN 325 MG/1
325 TABLET ORAL EVERY 6 HOURS SCHEDULED
Status: DISCONTINUED | OUTPATIENT
Start: 2019-06-13 | End: 2019-06-19 | Stop reason: HOSPADM

## 2019-06-12 RX ORDER — SODIUM CHLORIDE 0.9 % (FLUSH) 0.9 %
10 SYRINGE (ML) INJECTION EVERY 8 HOURS
Status: DISCONTINUED | OUTPATIENT
Start: 2019-06-12 | End: 2019-06-19 | Stop reason: HOSPADM

## 2019-06-12 RX ORDER — DEXTROSE MONOHYDRATE 25 G/50ML
25 INJECTION, SOLUTION INTRAVENOUS
Status: DISCONTINUED | OUTPATIENT
Start: 2019-06-12 | End: 2019-06-19 | Stop reason: HOSPADM

## 2019-06-12 RX ORDER — MORPHINE SULFATE 2 MG/ML
0.5 INJECTION, SOLUTION INTRAMUSCULAR; INTRAVENOUS
Status: DISCONTINUED | OUTPATIENT
Start: 2019-06-12 | End: 2019-06-18 | Stop reason: ALTCHOICE

## 2019-06-12 RX ORDER — NICOTINE POLACRILEX 4 MG
15 LOZENGE BUCCAL
Status: DISCONTINUED | OUTPATIENT
Start: 2019-06-12 | End: 2019-06-19 | Stop reason: HOSPADM

## 2019-06-12 RX ORDER — BENZONATATE 100 MG/1
100 CAPSULE ORAL 3 TIMES DAILY PRN
Status: DISCONTINUED | OUTPATIENT
Start: 2019-06-12 | End: 2019-06-19 | Stop reason: HOSPADM

## 2019-06-12 RX ADMIN — MEROPENEM 1 G: 1 INJECTION, POWDER, FOR SOLUTION INTRAVENOUS at 18:08

## 2019-06-12 RX ADMIN — METOPROLOL TARTRATE 25 MG: 25 TABLET ORAL at 10:13

## 2019-06-12 RX ADMIN — Medication 5 ML: at 10:17

## 2019-06-12 RX ADMIN — IPRATROPIUM BROMIDE AND ALBUTEROL SULFATE 1 AMPULE: 2.5; .5 SOLUTION RESPIRATORY (INHALATION) at 07:18

## 2019-06-12 RX ADMIN — PANTOPRAZOLE SODIUM 8 MG/HR: 40 INJECTION, POWDER, FOR SOLUTION INTRAVENOUS at 02:20

## 2019-06-12 RX ADMIN — IPRATROPIUM BROMIDE AND ALBUTEROL SULFATE 1 AMPULE: 2.5; .5 SOLUTION RESPIRATORY (INHALATION) at 14:59

## 2019-06-12 RX ADMIN — POTASSIUM CHLORIDE 20 MEQ: 400 INJECTION, SOLUTION INTRAVENOUS at 04:46

## 2019-06-12 RX ADMIN — INSULIN LISPRO 2 UNITS: 100 INJECTION, SOLUTION INTRAVENOUS; SUBCUTANEOUS at 09:54

## 2019-06-12 RX ADMIN — MEROPENEM 1 G: 1 INJECTION, POWDER, FOR SOLUTION INTRAVENOUS at 01:36

## 2019-06-12 RX ADMIN — ACETAMINOPHEN 325 MG: 325 TABLET ORAL at 10:14

## 2019-06-12 RX ADMIN — POTASSIUM CHLORIDE 20 MEQ: 400 INJECTION, SOLUTION INTRAVENOUS at 05:56

## 2019-06-12 RX ADMIN — FUROSEMIDE 20 MG: 10 INJECTION, SOLUTION INTRAMUSCULAR; INTRAVENOUS at 18:08

## 2019-06-12 RX ADMIN — IPRATROPIUM BROMIDE AND ALBUTEROL SULFATE 1 AMPULE: 2.5; .5 SOLUTION RESPIRATORY (INHALATION) at 10:48

## 2019-06-12 RX ADMIN — ACETAMINOPHEN 325 MG: 325 TABLET ORAL at 18:18

## 2019-06-12 RX ADMIN — MEROPENEM 1 G: 1 INJECTION, POWDER, FOR SOLUTION INTRAVENOUS at 10:14

## 2019-06-12 RX ADMIN — FUROSEMIDE 20 MG: 10 INJECTION, SOLUTION INTRAMUSCULAR; INTRAVENOUS at 10:41

## 2019-06-12 RX ADMIN — Medication 30 ML: at 10:29

## 2019-06-12 RX ADMIN — Medication 5 ML: at 18:18

## 2019-06-12 ASSESSMENT — PAIN SCALES - GENERAL: PAINLEVEL_OUTOF10: 6

## 2019-06-12 NOTE — PROGRESS NOTES
Nacho Cobb, daughter, she has been updated about the plans for discharge to [facility] today. She has been speaking prominently with Charu at that facility including paperwork. She received an updated about the patient's condition and is agreeable with rehab. She however, would like to speak directly with Dr. Frank Pickens regarding some questions. Message to be relayed when available.  Electronically signed by Gopal Zimmerman RN on 6/12/2019 at 11:19 AM

## 2019-06-12 NOTE — DISCHARGE SUMMARY
Physician Discharge Summary     Patient ID:  Van Fernandez  [id number]  81 y.o.  11/3/1931    Admit date: 6/1/2019    Discharge date and time:  Anticipated for today for LTAC transfer 6/12/2019    Admitting Physician: Mervyn Moritz, MD     Discharge Physician: Dr. Naren Szymanski    Admission Diagnoses: Perforated duodenal ulcer ([facility] [address] [K26.5]    Discharge Diagnoses: Perforated duodenal ulcer from NSAIDS, postop. pneumonia (CAP)    Admission Condition: good    Discharged Condition: good    Indication for Admission: Free air in the abdomen from perforated PUD    Hospital Course: Mrs. Mita Greenfield is a pleasant 81 y/o, w/f that was seen in the ER with acute upper abd. pain secondary to free air. The risks, benefits, and options were discussed with the pt. and family and they were agreeable to proceed with emergent surgery. She was found to have a perforated duodenal ulcer that was repaired with a [place] patch closure. Postop. she was watched in the ICU and was intubated for several days due to respiratory failure. She was eventually extubated with pt. improving resulting in transfer to floor. She then developed some SOB and decrease in O2 sat. CXR revealed fluid overload that respond initially to Lasix. Repeat CXR revealed bilateral infiltrates that were treated with Merrum and Vanco. IV. She was requiring CPAP for O2 support due to her O2 sats. dropping. This has remained stable and now she has been accepted for LTAC placement. She has been medically cleared by hospitalist services.          Consults: Hospitalist servies     Significant Diagnostic Studies: See Epic notes    Treatments: surgery:     Discharge Exam:  See Epic notes    Disposition: LTAC at [facility]    In process/preliminary results:  Outstanding Order Results     Date and Time Order Name Status Description    6/10/2019 1200 Fungus Culture In process     6/10/2019 1200 Throat Culture Preliminary     6/1/2019 6547 Central Line In process           Patient Instructions:   Current Discharge Medication List  PER LTAC DUE TO TRANSFER        CONTINUE these medications which have NOT CHANGED    Details   METOPROLOL TARTRATE PO Take 25 mg by mouth 2 times daily       losartan (COZAAR) 50 MG tablet Take 50 mg by mouth daily           Activity: no heavy lifting for 6 weeks  Diet: regular diet  Will need TPN at LTAC until resp. status improves  Wound Care: keep wound clean and dry    Follow-up with Dr. Jocelyn Nelson in 2 weeks.     Signed:  Electronically signed by Ty Rodriguez PA-C on 6/12/19 at 10:51 AM      6/12/2019  10:39 AM

## 2019-06-12 NOTE — PROGRESS NOTES
Weight, Pertinent Labs      Electronically signed by Shakeel Coulter on 6/12/19 at 2:31 PM    Contact Number: [phone]

## 2019-06-12 NOTE — CARE COORDINATION
Pt has been accepted at [facility] at [facility] is able to DC to the facility on Wednesday June 12th.   [facility] LTAC   N   F  Electronically signed by Lyubov Ruano on 6/12/2019 at 8:28 AM

## 2019-06-12 NOTE — PROGRESS NOTES
Hospitalist Progress Note  6/12/2019 9:55 AM  Subjective:   Admit Date: 6/1/2019  PCP: Angela Yoon    Chief Complaint: abdominal pain    Subjective: Continuing to complain of pain in her lips and mouth. Shortness of breath not significantly better or worse per patient. Remains on biPAP but is stable with SaO2 in low 90%s. Accepted to [facility] today. History is otherwise unchanged. ROS: 14 point review of systems is negative except as specifically addressed above.     DIET FULL LIQUID;  Dietary Nutrition Supplements: Low Calorie High Protein Supplement  PN-Adult Premix 5/20 - Standard Electrolytes - Central Line    Intake/Output Summary (Last 24 hours) at 6/12/2019 0955  Last data filed at 6/12/2019 0718  Gross per 24 hour   Intake 3395.77 ml   Output 3810 ml   Net -414.23 ml     Medications:   PN-Adult Premix 5/20 - Standard Electrolytes - Central Line 75 mL/hr at 06/11/19 2010    dextrose      pantoprozole (PROTONIX) infusion 8 mg/hr (06/12/19 0220)     Current Facility-Administered Medications   Medication Dose Route Frequency Provider Last Rate Last Dose    PN-Adult Premix 5/20 - Standard Electrolytes - Central Line   Intravenous Continuous TPN Sal Rizo MD 75 mL/hr at 06/11/19 2010      glucose (GLUTOSE) 40 % oral gel 15 g  15 g Oral PRN Sal Rizo MD        dextrose 50 % IV solution  12.5 g Intravenous PRN Sal Rizo MD        glucagon (rDNA) injection 1 mg  1 mg Intramuscular PRN Sal Rizo MD        dextrose 5 % solution  100 mL/hr Intravenous PRN Sal Rizo MD        furosemide (LASIX) injection 20 mg  20 mg Intravenous BID Елена Estrada MD   20 mg at 06/11/19 1821    meropenem (MERREM) 1 g in sodium chloride 0.9 % 100 mL IVPB (mini-bag)  1 g Intravenous Q8H Anel Juba, DO   Stopped at 06/12/19 0206    vancomycin (VANCOCIN) 1,250 mg in dextrose 5 % 250 mL IVPB  1,250 mg Intravenous Q24H Anel Juba, DO   Stopped at 06/11/19 1951    vancomycin (VANCOCIN) ipratropium-albuterol (DUONEB) nebulizer solution 1 ampule  1 ampule Inhalation Q4H WA Michelle Ramirez MD   1 ampule at 06/12/19 0718    ondansetron (ZOFRAN) injection 4 mg  4 mg Intravenous Q6H PRN Michelle Ramirez MD   4 mg at 06/01/19 1212    sodium chloride flush 0.9 % injection 10 mL  10 mL Intravenous 2 times per day Michelle Ramirez MD   10 mL at 06/11/19 2052    sodium chloride flush 0.9 % injection 10 mL  10 mL Intravenous PRN Michelle Ramirez MD   10 mL at 06/03/19 0004    pantoprazole (PROTONIX) 80 mg in sodium chloride 0.9 % 100 mL infusion  8 mg/hr Intravenous Continuous Michelle Ramirez MD 10 mL/hr at 06/12/19 0220 8 mg/hr at 06/12/19 0220        Labs:     Recent Labs     06/10/19  0500 06/11/19  0230 06/12/19  0138   WBC 20.1* 21.3* 16.0*   RBC 2.88* 2.93* 2.77*   HGB 7.8* 7.8* 7.6*   HCT 25.1* 25.6* 24.6*   MCV 87.2 87.4 88.8   MCH 27.1 26.6* 27.4   MCHC 31.1* 30.5* 30.9*    336 350     Recent Labs     06/10/19  0500  06/11/19  0230 06/12/19  0138 06/12/19  0838     --  139 142  --    K 3.6   < > 3.2* 3.2* 3.8   ANIONGAP 9  --  11 9  --    CL 98  --  96* 99  --    CO2 32*  --  32* 34*  --    BUN 25*  --  24* 28*  --    CREATININE 0.7  --  0.8 0.8  --    GLUCOSE 178*  --  171* 139*  --    CALCIUM 8.4*  --  8.3* 8.2*  --     < > = values in this interval not displayed. No results for input(s): MG, PHOS in the last 72 hours. No results for input(s): AST, ALT, ALB, BILITOT, ALKPHOS, ALB in the last 72 hours. ABGs:No results for input(s): PH, PO2, PCO2, HCO3, BE, O2SAT in the last 72 hours. Troponin T: No results for input(s): TROPONINI in the last 72 hours. INR: No results for input(s): INR in the last 72 hours.   Lactic Acid:   Recent Labs     06/10/19  1626   LACTA 1.6       Objective:   Vitals: BP (!) 143/73   Pulse 109   Temp 98.9 °F (37.2 °C) (Temporal)   Resp 28   Ht 5' 4\" (1.626 m)   Wt 165 lb 11.2 oz (75.2 kg)   SpO2 94%   BMI 28.44 kg/m²   24HR INTAKE/OUTPUT: Intake/Output Summary (Last 24 hours) at 6/12/2019 0955  Last data filed at 6/12/2019 5669  Gross per 24 hour   Intake 3395.77 ml   Output 3810 ml   Net -414.23 ml     General appearance: alert and cooperative with exam  HEENT: atraumatic, eyes with clear conjunctiva and normal lids, pupils and irises normal, external ears and nose are normal, lips normal  Neck without masses, lympadenopathy, bruit, thyroid normal  Lungs: no increased work of breathing, diminished breath sounds bilaterally and wheezes bilaterally  Heart: tachycardic but regular  Abdomen: soft, nondistended, bowel sounds present  Extremities: extremities normal, atraumatic, no cyanosis or edema  Neurologic: no focal neurologic deficits, normal sensation, alert and oriented, affect and mood appropriate  Skin: no rashes, nodules    Assessment and Plan:   Principal Problem:    Perforated duodenal ulcer ([facility] [address]  Active Problems:    Essential hypertension    CKD (chronic kidney disease), baseline stage unknown    JEFF (acute kidney injury) ([facility] [address]    Bowel perforation ([facility] [address] s/p repair now on [id number]    Cheilitis    Acute respiratory failure with hypoxia (HCC)  Resolved Problems:    * No resolved hospital problems. *    Day #3 vancomycin and meropenem empiric therapy for healthcare-associated pneumonia. Previously received 10 days of cefazolin empiric therapy for perforated duodenal ulcer. On biPAP to maintain SaO2 > 91%. Throat culture and throat fungal culture with KOH prep showing only normal silverio without fungal growth, but nystatin had already been started when these were performed. Continue magic mouthwash and nystatin. Stable for transfer to [facility] from my perspective. The patient may need hospice care due to her frail state and lack of clinical improvement, but is not willing to consider this at this time.     Advance Directive: Full Code    DVT prophylaxis: enoxaparin    Discharge planning: To [facility], DO  Rounding Hospitalist

## 2019-06-12 NOTE — DISCHARGE INSTR - DIET
 Good nutrition is important when healing from an illness, injury, or surgery. Follow any nutrition recommendations given to you during your hospital stay.  If you were given an oral nutrition supplement while in the hospital, continue to take this supplement at home. You can take it with meals, in-between meals, and/or before bedtime. These supplements can be purchased at most local grocery stores, pharmacies, and chain super-stores.  If you have any questions about your diet or nutrition, call the hospital and ask for the dietitian. Full liquid    We can only make recommendations for TPN. Recommendations are: Clinimix E 5/20 @ 75 mL/hr. 20% Lipids 250 mL twice weekly. TPN and lipids will provide an average of 1713 kcals and 90 g/PRO daily. Thank you.

## 2019-06-13 LAB
ALBUMIN SERPL-MCNC: 2.8 G/DL (ref 3.5–5)
ALBUMIN SERPL-MCNC: 3 G/DL (ref 3.5–5)
ALBUMIN/GLOB SERPL: 1 G/DL (ref 1.1–2.5)
ALBUMIN/GLOB SERPL: 1 G/DL (ref 1.1–2.5)
ALP SERPL-CCNC: 201 U/L (ref 24–120)
ALP SERPL-CCNC: 206 U/L (ref 24–120)
ALT SERPL W P-5'-P-CCNC: 72 U/L (ref 0–54)
ALT SERPL W P-5'-P-CCNC: 79 U/L (ref 0–54)
ANION GAP SERPL CALCULATED.3IONS-SCNC: 3 MMOL/L (ref 4–13)
ANION GAP SERPL CALCULATED.3IONS-SCNC: 5 MMOL/L (ref 4–13)
AST SERPL-CCNC: 106 U/L (ref 7–45)
AST SERPL-CCNC: 83 U/L (ref 7–45)
BASOPHILS # BLD AUTO: 0.02 10*3/MM3 (ref 0–0.2)
BASOPHILS NFR BLD AUTO: 0.2 % (ref 0–2)
BILIRUB SERPL-MCNC: 0.6 MG/DL (ref 0.1–1)
BILIRUB SERPL-MCNC: 0.6 MG/DL (ref 0.1–1)
BUN BLD-MCNC: 30 MG/DL (ref 5–21)
BUN BLD-MCNC: 33 MG/DL (ref 5–21)
BUN/CREAT SERPL: 33.7 (ref 7–25)
BUN/CREAT SERPL: 38.4 (ref 7–25)
CALCIUM SPEC-SCNC: 8.3 MG/DL (ref 8.4–10.4)
CALCIUM SPEC-SCNC: 8.6 MG/DL (ref 8.4–10.4)
CHLORIDE SERPL-SCNC: 101 MMOL/L (ref 98–110)
CHLORIDE SERPL-SCNC: 101 MMOL/L (ref 98–110)
CHOLEST SERPL-MCNC: 106 MG/DL (ref 130–200)
CO2 SERPL-SCNC: 37 MMOL/L (ref 24–31)
CO2 SERPL-SCNC: 39 MMOL/L (ref 24–31)
CREAT BLD-MCNC: 0.86 MG/DL (ref 0.5–1.4)
CREAT BLD-MCNC: 0.89 MG/DL (ref 0.5–1.4)
CRP SERPL-MCNC: 15.69 MG/DL (ref 0–0.99)
DEPRECATED RDW RBC AUTO: 52.8 FL (ref 40–54)
EOSINOPHIL # BLD AUTO: 0.41 10*3/MM3 (ref 0–0.7)
EOSINOPHIL NFR BLD AUTO: 3.7 % (ref 0–4)
ERYTHROCYTE [DISTWIDTH] IN BLOOD BY AUTOMATED COUNT: 17.2 % (ref 12–15)
GFR SERPL CREATININE-BSD FRML MDRD: 60 ML/MIN/1.73
GFR SERPL CREATININE-BSD FRML MDRD: 62 ML/MIN/1.73
GLOBULIN UR ELPH-MCNC: 2.8 GM/DL
GLOBULIN UR ELPH-MCNC: 3 GM/DL
GLUCOSE BLD-MCNC: 111 MG/DL (ref 70–100)
GLUCOSE BLD-MCNC: 90 MG/DL (ref 70–100)
GLUCOSE BLDC GLUCOMTR-MCNC: 81 MG/DL (ref 70–130)
GLUCOSE BLDC GLUCOMTR-MCNC: 94 MG/DL (ref 70–130)
HCT VFR BLD AUTO: 25.4 % (ref 37–47)
HGB BLD-MCNC: 7.9 G/DL (ref 12–16)
IMM GRANULOCYTES # BLD AUTO: 0.14 10*3/MM3 (ref 0–0.05)
IMM GRANULOCYTES NFR BLD AUTO: 1.3 % (ref 0–5)
LYMPHOCYTES # BLD AUTO: 1.12 10*3/MM3 (ref 0.72–4.86)
LYMPHOCYTES NFR BLD AUTO: 10 % (ref 15–45)
MAGNESIUM SERPL-MCNC: 1.7 MG/DL (ref 1.4–2.2)
MCH RBC QN AUTO: 26.9 PG (ref 28–32)
MCHC RBC AUTO-ENTMCNC: 31.1 G/DL (ref 33–36)
MCV RBC AUTO: 86.4 FL (ref 82–98)
MONOCYTES # BLD AUTO: 0.81 10*3/MM3 (ref 0.19–1.3)
MONOCYTES NFR BLD AUTO: 7.3 % (ref 4–12)
NEUTROPHILS # BLD AUTO: 8.65 10*3/MM3 (ref 1.87–8.4)
NEUTROPHILS NFR BLD AUTO: 77.5 % (ref 39–78)
NRBC BLD AUTO-RTO: 0 /100 WBC (ref 0–0.2)
PHOSPHATE SERPL-MCNC: 2.8 MG/DL (ref 2.5–4.5)
PLATELET # BLD AUTO: 393 10*3/MM3 (ref 130–400)
PMV BLD AUTO: 10.2 FL (ref 6–12)
POTASSIUM BLD-SCNC: 3.1 MMOL/L (ref 3.5–5.3)
POTASSIUM BLD-SCNC: 3.3 MMOL/L (ref 3.5–5.3)
PREALB SERPL-MCNC: 10.9 MG/DL (ref 18–36)
PROT SERPL-MCNC: 5.6 G/DL (ref 6.3–8.7)
PROT SERPL-MCNC: 6 G/DL (ref 6.3–8.7)
RBC # BLD AUTO: 2.94 10*6/MM3 (ref 4.2–5.4)
SODIUM BLD-SCNC: 143 MMOL/L (ref 135–145)
SODIUM BLD-SCNC: 143 MMOL/L (ref 135–145)
TRIGL SERPL-MCNC: 78 MG/DL (ref 0–149)
WBC NRBC COR # BLD: 11.15 10*3/MM3 (ref 4.8–10.8)

## 2019-06-13 PROCEDURE — 85025 COMPLETE CBC W/AUTO DIFF WBC: CPT | Performed by: INTERNAL MEDICINE

## 2019-06-13 PROCEDURE — 97530 THERAPEUTIC ACTIVITIES: CPT | Performed by: PHYSICAL THERAPIST

## 2019-06-13 PROCEDURE — 25010000002 VANCOMYCIN: Performed by: INTERNAL MEDICINE

## 2019-06-13 PROCEDURE — 86140 C-REACTIVE PROTEIN: CPT | Performed by: INTERNAL MEDICINE

## 2019-06-13 PROCEDURE — 82962 GLUCOSE BLOOD TEST: CPT

## 2019-06-13 PROCEDURE — 92526 ORAL FUNCTION THERAPY: CPT

## 2019-06-13 PROCEDURE — 97166 OT EVAL MOD COMPLEX 45 MIN: CPT | Performed by: OCCUPATIONAL THERAPIST

## 2019-06-13 PROCEDURE — 84134 ASSAY OF PREALBUMIN: CPT | Performed by: INTERNAL MEDICINE

## 2019-06-13 PROCEDURE — 83735 ASSAY OF MAGNESIUM: CPT | Performed by: INTERNAL MEDICINE

## 2019-06-13 PROCEDURE — 84478 ASSAY OF TRIGLYCERIDES: CPT | Performed by: INTERNAL MEDICINE

## 2019-06-13 PROCEDURE — 97535 SELF CARE MNGMENT TRAINING: CPT | Performed by: OCCUPATIONAL THERAPIST

## 2019-06-13 PROCEDURE — 80053 COMPREHEN METABOLIC PANEL: CPT | Performed by: INTERNAL MEDICINE

## 2019-06-13 PROCEDURE — 97162 PT EVAL MOD COMPLEX 30 MIN: CPT | Performed by: PHYSICAL THERAPIST

## 2019-06-13 PROCEDURE — 92610 EVALUATE SWALLOWING FUNCTION: CPT

## 2019-06-13 PROCEDURE — 25010000002 MEROPENEM: Performed by: INTERNAL MEDICINE

## 2019-06-13 PROCEDURE — 25010000002 FUROSEMIDE PER 20 MG: Performed by: INTERNAL MEDICINE

## 2019-06-13 PROCEDURE — 82465 ASSAY BLD/SERUM CHOLESTEROL: CPT | Performed by: INTERNAL MEDICINE

## 2019-06-13 PROCEDURE — 84100 ASSAY OF PHOSPHORUS: CPT | Performed by: INTERNAL MEDICINE

## 2019-06-13 NOTE — H&P
[facility]  THA Quiles M.D.  HANK Colin APRN        Internal Medicine History and Physical      Name: Suzy Larkin  MRN: [id number]     Acct: [id number]  Room: [facility]    Admit Date: 2019  PCP: Provider, No Known    Chief Complaint:     Nutrition replacement and need for continued oxygen    History Obtained From:     Reviewed chart on file.     History of Present Illness:      Suzy Larkin is a  87 y.o.  female who presents to the  for continued nutritional replacement and oxygen.  Patient was recently admitted to [facility] from [facility] due to abdominal pain, CT at [place] showed free air in abdomen.  Patient stated she had been using high doses of Ibuprofen for approximately one month before abdominal pain and vomiting began. Patient underwent expiratory laparotomy, consistent with perforated duodenal ulcer.  No surgical complications.  Patient did however after extubation had poor renal function.  Nephrology consulted and treated with IVF and renal ultrasound.    Patient is now sitting up in bed, was eating minimal food at this time.  No family present at this time.  Patient states she did not get any sleep during the night, states pain well controlled.      Hgb low 7.9.  Renal function stable as well as electrolytes.    Dietician to assess for improved intake.      Past Medical History:     Bowel Perforation  Chronic kidney disease, unknown stage  Hypertension  Sciatica      Past Surgical History:     Bowel perforation repair, expiratory laparotomy    Medications Prior to Admission:       Medication list reviewed on chart.     Allergies:       Patient has no known allergies.    Social History:     Tobacco:   Never smoker  Alcohol:    Denies alcohol use  Drug Use: Denies drug use    Family History:     Mother:  smoke inhalation in fire  Father:  of prostate cancer    Review of Systems:  "    Review of Systems   Constitution: Positive for weakness and malaise/fatigue. Negative for decreased appetite and fever.   HENT: Negative for congestion, hearing loss and sore throat.         Dry mouth   Eyes: Negative for blurred vision and discharge.   Cardiovascular: Negative for chest pain and dyspnea on exertion.   Respiratory: Negative for cough and shortness of breath.    Skin: Negative for itching and rash.   Musculoskeletal: Positive for back pain and muscle weakness.   Gastrointestinal: Positive for bowel incontinence. Negative for abdominal pain, diarrhea, nausea and vomiting.   Neurological: Negative for dizziness and tremors.   Psychiatric/Behavioral: Negative for altered mental status and depression. The patient has insomnia.        Code Status:    Full Code    Physical Exam:     Vitals:  Ht 162.6 cm (64\")   Wt 64.5 kg (142 lb 4.8 oz)   BMI 24.43 kg/m²     T 98.8 P 97 R 22 /73 Sp02 90%      Physical Exam   Constitutional: She is oriented to person, place, and time. She appears well-developed. No distress.   HENT:   Head: Normocephalic and atraumatic.   Mouth/Throat: Mucous membranes are dry.   Eyes: Conjunctivae and EOM are normal. Pupils are equal, round, and reactive to light.   Neck: Normal range of motion. Neck supple.   Cardiovascular: Normal rate, regular rhythm, normal heart sounds and intact distal pulses.   Pulmonary/Chest: Effort normal and breath sounds normal.   Abdominal: Soft. Bowel sounds are normal. She exhibits no distension. There is tenderness.   Musculoskeletal: Normal range of motion.   Generalized weakness   Neurological: She is alert and oriented to person, place, and time.   Skin: Skin is warm and dry.   Psychiatric: She has a normal mood and affect.             Data:     Lab Results (last 7 days)     Procedure Component Value Units Date/Time    POC Glucose Once [[id number]]  (Normal) Collected:  06/13/19 1125    Specimen:  Blood Updated:  06/13/19 1206     Glucose 94 " mg/dL      Comment: : MADELEINE NaranjoMeter ID: [id number]       Prealbumin [[id number]]  (Abnormal) Collected:  06/13/19 0455    Specimen:  Blood Updated:  06/13/19 0553     Prealbumin 10.9 mg/dL     Comprehensive Metabolic Panel [[id number]]  (Abnormal) Collected:  06/13/19 0455    Specimen:  Blood Updated:  06/13/19 0548     Glucose 90 mg/dL      BUN 33 mg/dL      Creatinine 0.86 mg/dL      Sodium 143 mmol/L      Potassium 3.3 mmol/L      Chloride 101 mmol/L      CO2 39.0 mmol/L      Calcium 8.3 mg/dL      Total Protein 5.6 g/dL      Albumin 2.80 g/dL      ALT (SGPT) 79 U/L      AST (SGOT) 106 U/L      Alkaline Phosphatase 206 U/L      Total Bilirubin 0.6 mg/dL      eGFR Non African Amer 62 mL/min/1.73      Globulin 2.8 gm/dL      A/G Ratio 1.0 g/dL      BUN/Creatinine Ratio 38.4     Anion Gap 3.0 mmol/L     Narrative:       GFR Normal >60  Chronic Kidney Disease <60  Kidney Failure <15    CBC & Differential [[id number]] Collected:  06/13/19 0455    Specimen:  Blood Updated:  06/13/19 0540    Narrative:       The following orders were created for panel order CBC & Differential.  Procedure                               Abnormality         Status                     ---------                               -----------         ------                     CBC Auto Differential[[id number]]        Abnormal            Final result                 Please view results for these tests on the individual orders.    CBC Auto Differential [[id number]]  (Abnormal) Collected:  06/13/19 0455    Specimen:  Blood Updated:  06/13/19 0540     WBC 11.15 10*3/mm3      RBC 2.94 10*6/mm3      Hemoglobin 7.9 g/dL      Hematocrit 25.4 %      MCV 86.4 fL      MCH 26.9 pg      MCHC 31.1 g/dL      RDW 17.2 %      RDW-SD 52.8 fl      MPV 10.2 fL      Platelets 393 10*3/mm3      Neutrophil % 77.5 %      Lymphocyte % 10.0 %      Monocyte % 7.3 %      Eosinophil % 3.7 %      Basophil % 0.2 %      Immature Grans % 1.3 %      Neutrophils,  Absolute 8.65 10*3/mm3      Lymphocytes, Absolute 1.12 10*3/mm3      Monocytes, Absolute 0.81 10*3/mm3      Eosinophils, Absolute 0.41 10*3/mm3      Basophils, Absolute 0.02 10*3/mm3      Immature Grans, Absolute 0.14 10*3/mm3      nRBC 0.0 /100 WBC     POC Glucose Once [[id number]]  (Normal) Collected:  06/12/19 2346    Specimen:  Blood Updated:  06/12/19 2357     Glucose 125 mg/dL      Comment: : SHOSHANA HansenMeter ID: [id number]           Xr Chest 1 View    Result Date: 6/12/2019  Narrative: EXAMINATION: XR CHEST 1 VW-. 6/12/2019 8:35 PM CDT  CHEST, ONE VIEW:  HISTORY: Central venous catheter placement  COMPARISON: None  A single frontal chest radiograph was obtained.  FINDINGS:  Right IJ deep line well-positioned in superior vena cava region without pneumothorax.  There is patchy bilateral perihilar and lower lobe infiltrates with left lower lobe consolidation.  Acute infectious/inflammatory change, pneumonia considered as well as pulmonary edema.                                  Impression: 1. Right IJ deep line well-positioned without pneumothorax or postprocedural complications. 2. Bilateral parenchymal infiltrates.  This report was finalized on 06/12/2019 20:36 by Dr. Hever Williamson MD.        Assesment:         Plan:     1. S/p Duodenal Ulcer perforation repair  2. Chonic kidney disease  3. Hypertension  4. Protein calorie malnutrition  5. Anemia  6. Sciatica      Continue with current treatments.  Monitor counts.  Aggressive therapy as tolerated by patient.  Continue antibiotics. Maintain patient safety.  TPN continued.  Dietician consulted.  B12, Folate, Iron panel.  Labs Monday.       Electronically signed by HANK Vicente on 6/13/2019 at 12:52 PM     I have discussed the care of Suzy Larkin, including pertinent history and exam findings, with the nurse practitioner.    I have seen and examined the patient and the key elements of all parts of the encounter have been  performed by me.  I agree with the assessment, plan and orders as documented by HANK Vicente, after I modified the exam findings and the plan of treatments and the final version is my approved version of the assessment.        Electronically signed by Shorty Whalen MD on 6/13/2019 at 4:09 PM

## 2019-06-13 NOTE — PROGRESS NOTES
"Pharmacy Dosing Service  Pharmacokinetics  Vancomycin Initial Evaluation    Assessment/Action/Plan:  Initiated Vancomycin 1250 mg IVPB every 24 hours (patient was on this dose at previous facility).  Ordered trough level before dose at 2130 6/13/19.  Current vancomycin end date: 6/19/19. Pharmacy will monitor renal function and adjust dose accordingly.     Subjective:  Suzy Larkin is a 87 y.o. female with a Vancomycin \"Pharmacy to Dose\" consult for the treatment of Pneumonia.    Objective:  Ht: 162.6 cm (64\"); Wt: 64.5 kg (142 lb 4.8 oz)  Estimated Creatinine Clearance: 46.9 mL/min (by C-G formula based on SCr of 0.86 mg/dL).   Lab Results   Component Value Date    CREATININE 0.86 06/13/2019      Lab Results   Component Value Date    WBC 11.15 (H) 06/13/2019      Baseline culture results:  Microbiology Results (last 10 days)     ** No results found for the last 240 hours. **          BRIAN Hager RPH  06/13/19 8:05 AM    "

## 2019-06-13 NOTE — PROGRESS NOTES
"[facility] Fall Assessment Note    Suzy Larkin is a 87 y.o.female  [Ht: 162.6 cm (64\"); Wt: 64.5 kg (142 lb 4.8 oz)] admitted 6/12/2019  7:31 PM.    Current medications associated with an increased risk for fall include:    Current Facility-Administered Medications:     •  Furosemide (LASIX) injection 20 mg, 20 mg, Intravenous, BID  •  HYDROcodone-acetaminophen (NORCO) 5-325 MG per tablet 1 tablet, 1 tablet, Oral, Q4H PRN  •  HYDROcodone-acetaminophen (NORCO) 5-325 MG per tablet 2 tablet, 2 tablet, Oral, Q4H PRN  •  HYDROmorphone (DILAUDID) injection 0.25 mg, 0.25 mg, Intravenous, Q3H PRN  •  Metoprolol tartrate (LOPRESSOR) tablet 25 mg, 25 mg, Oral, Q12H  •  Morphine injection 0.5 mg, 0.5 mg, Intravenous, Q30 Min PRN  •  Ondansetron (ZOFRAN) injection 4 mg, 4 mg, Intravenous, Q6H PRN  •  Pantoprazole (PROTONIX) 40 mg/100 mL (0.4 mg/mL) in 0.9% NS IVPB, 8 mg/hr, Intravenous, Continuous      Emperatriz Jennings, PharmD  06/12/198:44 PM         "

## 2019-06-13 NOTE — PROGRESS NOTES
Adult Nutrition  Assessment/PES    Patient Name:  Suzy Larkin  YOB: 1931  MRN: [id number]  Admit Date:  6/12/2019    Assessment Date:  6/13/2019    Comments:  New admit to [facility]. Pt had been on TPN/Lipids and PO diet at previously facility. Ordered D20AA5 @ 75ml/hr with lipids x2 weekly; will monitor pertinent labs and adjust as needed. TPN to meet 100% of estimated calorie and protein needs and pt may eat as tolerated. Pt is on a Full liquid diet however has multiple mouth sores- miracle mouthwash for treatment. Will monitor PO intake and wean from TPN when able and per MD order. Noted pt to like ensure, ordered Boost daily with dinner.    Reason for Assessment     Row Name 06/13/19 1204          Reason for Assessment    Reason For Assessment  physician consult;TF/PN     Diagnosis  gastrointestinal disease;pulmonary disease         Nutrition/Diet History     Row Name 06/13/19 1206          Nutrition/Diet History    Typical Food/Fluid Intake  new admit to [facility]. pt transferred to [facility] from [facility] where she was on Full liquid diet and TPN with lipids. TPN to continue until MD wishes to discontinue.      Supplemental Drinks/Foods/Additives  noted pt to like drinking ensure, will order boost daily. pt has many mouth sores, miracle mouthwash is current on MAR     Factors Affecting Nutritional Intake  sore mouth         Anthropometrics     Row Name 06/13/19 1209          Admit Weight    Admit Weight  64.4 kg (142 lb)        Body Mass Index (BMI)    BMI Assessment  BMI 18.5-24.9: normal         Labs/Tests/Procedures/Meds     Row Name 06/13/19 1210          Labs/Procedures/Meds    Lab Results Reviewed  reviewed, pertinent        Diagnostic Tests/Procedures    Diagnostic Test/Procedure Reviewed  reviewed, pertinent        Medications    Pertinent Medications Reviewed  reviewed         Physical Findings     Row Name 06/13/19 1210          Physical Findings    Overall Physical Appearance  on  oxygen therapy     Skin  other (see comments);pressure injury;edema abd incision; coccyx PI; 2+ pitting edema, BLE; neto score 15         Estimated/Assessed Needs     Row Name 06/13/19 1007          Calculation Measurements    Weight Used For Calculations  64.4 kg (142 lb)        Estimated/Assessed Needs    Additional Documentation  Calorie Requirements (Group);Fluid Requirements (Group);Rogers-St. Jeor Equation (Group);Protein Requirements (Group);KCAL/KG (Group)        Calorie Requirements    Estimated Calorie Requirement (kcal/day)  1383     Estimated Calorie Need Method  Rogers-St Jeor;kcal/kg     Estimated Calorie Requirement Comment  9698-5812        KCAL/KG    KCAL/KG  25 Kcal/Kg (kcal)     25 Kcal/Kg (kcal)  1610.275        Rogers-St. Jeor Equation    RMR (Rogers-St. Jeor Equation)  1064.11        Protein Requirements    Est Protein Requirement Amount (gms/kg)  1.4 gm protein     Estimated Protein Requirements (gms/day)  90.18        Fluid Requirements    Estimated Fluid Requirement Method  RDA Method     Jessica-Pedro Method (over 20 kg)  2788.22         Nutrition Prescription Ordered     Row Name 06/13/19 1212          Nutrition Prescription PO    Current PO Diet  Full Liquid        Nutrition Prescription PN    PN Route  Central to be resumed tonight @1800     Lipid Volume (mL)  250 mL     Lipid Frequency  Other (comment) x2 weekly         Evaluation of Received Nutrient/Fluid Intake     Row Name 06/13/19 1007          Calculation Measurements    Weight Used For Calculations  64.4 kg (142 lb)        Nutrient/Fluid Evaluation    Number of Days Evaluated  1 day     Additional Documentation  Fluid Intake Evaluation (Group)        Fluid Intake Evaluation    Oral Fluid (mL)  240     IV Fluid (mL)  504        PO Evaluation    Number of Days PO Intake Evaluated  1 day     Number of Meals  1     % PO Intake  25         Evaluation of Prescribed Nutrient/Fluid Intake     Row Name 06/13/19 1007           Calculation Measurements    Weight Used For Calculations  64.4 kg (142 lb)             Problem/Interventions:  Problem 1     Row Name 06/13/19 1217          Nutrition Diagnoses Problem 1    Problem 1  Needs Alternate Route     Etiology (related to)  Medical Diagnosis;Factors Affecting Nutrition     Gastrointestinal  Duodenal ulcer perforated duodenal ulcer s/p repair     Oral  Mouth Pain mouth sores present     Signs/Symptoms (evidenced by)  PO Intake;Other (comment) PN to aid with meeting nutritional needs     Percent (%) intake recorded  25 %     Over number of meals  1                 Intervention Goal     Row Name 06/13/19 1219          Intervention Goal    General  Nutrition support treatment;Maintain nutrition;Improved nutrition related lab(s);Reduce/improve symptoms;Meet nutritional needs for age/condition     PO  Increase intake;Meet estimated needs     TF/PN  Inititiate TF/PN     Transition  PN to PO     Weight  Maintain weight         Nutrition Intervention     Row Name 06/13/19 1220          Nutrition Intervention    RD/Tech Action  Follow Tx progress;Care plan reviewd;Recommend/ordered     Recommended/Ordered  PN         Nutrition Prescription     Row Name 06/13/19 1220          Nutrition Prescription PN    Parenteral Prescription  PN begin/change;Parenteral to supply     PN Route  Central     Dextrose Concentration (%)  20 %     Dextrose (Kcal)  1224     Amino Acid Concentration (%)  5.0%     Amino Acid (gm)  90     PN Goal Rate (mL/hr)  75 mL/hr     PN Starting Rate (mL/hr)  75 mL/hr     PN Goal Volume (mL)  1800 mL     PN Starting Volume (mL)  1800 mL     Lipid Concentration (%)  20%     Lipid Volume (mL)  250 mL     Lipid Frequency  Other (comment) x2 weekly     New PN Prescription Ordered?  Yes        PN to Supply    NPC/Day  1224 NPC/day     Kcal/Day  1727 Kcal/day     Kcal/Kg  27 Kcal/kg     Kcal/Kg Weight Method  Actual weight     Protein (gm/day)  90 gm/day     Meet Estimated Kcal Need (%)  100  %     Meet Estimated Protein Need (%)  100 %        Other Orders    Labs  Lipid Profile;Phos;Mg++     Labs Ordered?  Yes         Education/Evaluation     Row Name 06/13/19 5316          Education    Education  No discharge needs identified at this time        Monitor/Evaluation    Monitor  Per protocol;I&O;Pertinent labs;PN delivery/tolerance;Weight;Skin status;Symptoms           Electronically signed by:  Mary Viera  06/13/19 12:34 PM

## 2019-06-14 LAB
ALBUMIN SERPL-MCNC: 2.5 G/DL (ref 3.5–5)
ALBUMIN/GLOB SERPL: 0.9 G/DL (ref 1.1–2.5)
ALP SERPL-CCNC: 144 U/L (ref 24–120)
ALT SERPL W P-5'-P-CCNC: 58 U/L (ref 0–54)
ANION GAP SERPL CALCULATED.3IONS-SCNC: 2 MMOL/L (ref 4–13)
AST SERPL-CCNC: 47 U/L (ref 7–45)
BILIRUB SERPL-MCNC: 0.4 MG/DL (ref 0.1–1)
BUN BLD-MCNC: 26 MG/DL (ref 5–21)
BUN/CREAT SERPL: 30.2 (ref 7–25)
CALCIUM SPEC-SCNC: 8.3 MG/DL (ref 8.4–10.4)
CHLORIDE SERPL-SCNC: 102 MMOL/L (ref 98–110)
CO2 SERPL-SCNC: 36 MMOL/L (ref 24–31)
CREAT BLD-MCNC: 0.86 MG/DL (ref 0.5–1.4)
FOLATE SERPL-MCNC: 9.85 NG/ML (ref 4.78–24.2)
GFR SERPL CREATININE-BSD FRML MDRD: 62 ML/MIN/1.73
GLOBULIN UR ELPH-MCNC: 2.8 GM/DL
GLUCOSE BLD-MCNC: 205 MG/DL (ref 70–100)
GLUCOSE BLDC GLUCOMTR-MCNC: 202 MG/DL (ref 70–130)
GLUCOSE BLDC GLUCOMTR-MCNC: 215 MG/DL (ref 70–130)
GLUCOSE BLDC GLUCOMTR-MCNC: 229 MG/DL (ref 70–130)
GLUCOSE BLDC GLUCOMTR-MCNC: 97 MG/DL (ref 70–130)
IRON 24H UR-MRATE: 19 MCG/DL (ref 42–180)
IRON SATN MFR SERPL: 10 % (ref 20–45)
MAGNESIUM SERPL-MCNC: 1.8 MG/DL (ref 1.4–2.2)
PHOSPHATE SERPL-MCNC: 2.8 MG/DL (ref 2.5–4.5)
POTASSIUM BLD-SCNC: 3.2 MMOL/L (ref 3.5–5.3)
PROT SERPL-MCNC: 5.3 G/DL (ref 6.3–8.7)
SODIUM BLD-SCNC: 140 MMOL/L (ref 135–145)
TIBC SERPL-MCNC: 190 MCG/DL (ref 225–420)
VANCOMYCIN TROUGH SERPL-MCNC: 12.01 MCG/ML (ref 10–20)
VIT B12 BLD-MCNC: 667 PG/ML (ref 239–931)

## 2019-06-14 PROCEDURE — 82607 VITAMIN B-12: CPT | Performed by: INTERNAL MEDICINE

## 2019-06-14 PROCEDURE — 82746 ASSAY OF FOLIC ACID SERUM: CPT | Performed by: INTERNAL MEDICINE

## 2019-06-14 PROCEDURE — 80053 COMPREHEN METABOLIC PANEL: CPT | Performed by: INTERNAL MEDICINE

## 2019-06-14 PROCEDURE — 83540 ASSAY OF IRON: CPT | Performed by: INTERNAL MEDICINE

## 2019-06-14 PROCEDURE — 92526 ORAL FUNCTION THERAPY: CPT

## 2019-06-14 PROCEDURE — 25010000002 MEROPENEM: Performed by: INTERNAL MEDICINE

## 2019-06-14 PROCEDURE — 83735 ASSAY OF MAGNESIUM: CPT | Performed by: INTERNAL MEDICINE

## 2019-06-14 PROCEDURE — 80202 ASSAY OF VANCOMYCIN: CPT | Performed by: INTERNAL MEDICINE

## 2019-06-14 PROCEDURE — 25010000002 FUROSEMIDE PER 20 MG: Performed by: INTERNAL MEDICINE

## 2019-06-14 PROCEDURE — 84100 ASSAY OF PHOSPHORUS: CPT | Performed by: INTERNAL MEDICINE

## 2019-06-14 PROCEDURE — 25010000002 VANCOMYCIN: Performed by: INTERNAL MEDICINE

## 2019-06-14 PROCEDURE — 25010000002 HYDROMORPHONE 1 MG/ML SOLUTION: Performed by: INTERNAL MEDICINE

## 2019-06-14 PROCEDURE — 63710000001 INSULIN LISPRO (HUMAN) PER 5 UNITS: Performed by: INTERNAL MEDICINE

## 2019-06-14 PROCEDURE — 82962 GLUCOSE BLOOD TEST: CPT

## 2019-06-14 PROCEDURE — 83550 IRON BINDING TEST: CPT | Performed by: INTERNAL MEDICINE

## 2019-06-14 RX ORDER — PANTOPRAZOLE SODIUM 40 MG/10ML
40 INJECTION, POWDER, LYOPHILIZED, FOR SOLUTION INTRAVENOUS EVERY 12 HOURS SCHEDULED
Status: DISCONTINUED | OUTPATIENT
Start: 2019-06-14 | End: 2019-06-19 | Stop reason: ALTCHOICE

## 2019-06-14 RX ORDER — BUSPIRONE HYDROCHLORIDE 10 MG/1
10 TABLET ORAL EVERY 12 HOURS SCHEDULED
Status: DISCONTINUED | OUTPATIENT
Start: 2019-06-14 | End: 2019-06-19 | Stop reason: HOSPADM

## 2019-06-14 NOTE — PROGRESS NOTES
[facility]  Juan R Whalen M.D.  VASILIY Whalen M.D.  HANK Colin APRN      Internal Medicine Progress Note    6/14/2019   9:18 AM    Name:  Suzy Larkin  MRN:    [id number]     Acct:     [id number]   Room:  [address] [place] [address] Day: 0     Admit Date: 6/12/2019  7:31 PM  PCP: Provider, No Known    Subjective:     C/C: nutrition replacement and oxygen weaning    Interval History: Status: stayed the same.  Resting in bed, no family present at this time.  States she did sleep better last night.  States she is just very weak, unable to work with therapy this am.  States she will continue to work with therapy.  Appears very anxious.     Review of Systems   Constitution: Positive for weakness. Negative for chills, decreased appetite and fever.   HENT: Negative for congestion, ear pain, nosebleeds and sore throat.    Eyes: Negative for blurred vision and discharge.   Cardiovascular: Negative for chest pain and palpitations.   Respiratory: Negative for cough, shortness of breath and wheezing.    Skin: Negative for itching and rash.   Musculoskeletal: Positive for back pain and muscle weakness. Negative for arthritis.   Gastrointestinal: Positive for abdominal pain and bowel incontinence. Negative for constipation, diarrhea, nausea and vomiting.   Genitourinary: Negative for dysuria and urgency.   Neurological: Negative for dizziness and numbness.   Psychiatric/Behavioral: Negative for altered mental status and depression. The patient has insomnia.          Medications:     Allergies: Allergies no known allergies    Current Meds:   Current Facility-Administered Medications:   •  acetaminophen (TYLENOL) tablet 325 mg, 325 mg, Oral, Q6H, Shorty Whalen MD  •  Adult Standard Central TPN, , Intravenous, Q24H (TPN) **AND** fat emulsion (INTRALIPID,LIPOSYN) 20 % infusion 50 g, 250 mL, Intravenous, Once per day on Mon Thu, Shorty Whalen MD  •  benzonatate (TESSALON)  capsule 100 mg, 100 mg, Oral, TID PRN, Shorty Whalen MD  •  dextrose (D50W) 25 g/ 50mL Intravenous Solution 25 g, 25 g, Intravenous, Q15 Min PRN, Shorty Whalen MD  •  dextrose (GLUTOSE) oral gel 15 g, 15 g, Oral, Q15 Min PRN, Shorty Whalen MD  •  furosemide (LASIX) injection 20 mg, 20 mg, Intravenous, BID, Shorty Whalen MD  •  glucagon (human recombinant) (GLUCAGEN DIAGNOSTIC) injection 1 mg, 1 mg, Subcutaneous, PRN, Shorty Whalen MD  •  HYDROcodone-acetaminophen (NORCO) 5-325 MG per tablet 1 tablet, 1 tablet, Oral, Q4H PRN, Shorty Whalen MD  •  HYDROcodone-acetaminophen (NORCO) 5-325 MG per tablet 2 tablet, 2 tablet, Oral, Q4H PRN, Shorty Whalen MD  •  HYDROmorphone (DILAUDID) injection 0.25 mg, 0.25 mg, Intravenous, Q3H PRN, Shorty Whalen MD  •  insulin lispro (humaLOG) injection 2-7 Units, 2-7 Units, Subcutaneous, Q6H, Shorty Whalen MD  •  ipratropium-albuterol (DUO-NEB) nebulizer solution 3 mL, 3 mL, Nebulization, Q4H While Awake - RT, Shorty Whalen MD  •  lidocaine-Maalox-diphenhydramine (MIRACLE MOUTHWASH) oral suspension 5 mL, 5 mL, Swish & Swallow, 4x Daily PRN, Shorty Whalen MD  •  losartan (COZAAR) tablet 25 mg, 25 mg, Oral, Q24H, Shorty Whalen MD  •  meropenem (MERREM) 1 g/100 mL 0.9% NS VTB (mbp), 1 g, Intravenous, Q8H, Shorty Whalen MD  •  metoprolol tartrate (LOPRESSOR) tablet 25 mg, 25 mg, Oral, Q12H, Shorty Whalen MD  •  morphine injection 0.5 mg, 0.5 mg, Intravenous, Q30 Min PRN, Shorty Whalen MD  •  nystatin (MYCOSTATIN) [id number] UNIT/ML suspension 500,000 Units, 5 mL, Swish & Swallow, 4x Daily, Shorty Whalen MD  •  ondansetron (ZOFRAN) injection 4 mg, 4 mg, Intravenous, Q6H PRN, Shorty Whalen MD  •  pantoprazole (PROTONIX) 40 mg/100 mL (0.4 mg/mL) in 0.9% NS IVPB, 8 mg/hr, Intravenous, Continuous, Shorty Whalen MD  •  sodium chloride 0.9 %  "flush 10 mL, 10 mL, Intracatheter, Q8H, Shorty Whalen MD  •  sodium chloride 0.9 % flush 10 mL, 10 mL, Intracatheter, PRN, Shorty Whalen MD  •  vancomycin 1250 mg/250 mL 0.9% NS IVPB ([facility]), 1,250 mg, Intravenous, Q24H, Shorty Whalen MD  •  vitamin D (ERGOCALCIFEROL) capsule 50,000 Units, 50,000 Units, Oral, Q7 Days, Shorty Whalen MD    Data:     Code Status:    There are no questions and answers to display.       No family history on file.    Social History     Socioeconomic History   • Marital status: Single     Spouse name: Not on file   • Number of children: Not on file   • Years of education: Not on file   • Highest education level: Not on file       Vitals:  Ht 162.6 cm (64\")   Wt 64.5 kg (142 lb 4.8 oz)   BMI 24.43 kg/m²     T 98.6 P 108 R 18 /60  Sp02 96% (NC)          I/O (24Hr):  No intake or output data in the 24 hours ending 06/14/19 0918    Labs and imaging:      Lab Results (last 24 hours)     Procedure Component Value Units Date/Time    Comprehensive Metabolic Panel [[id number]]  (Abnormal) Collected:  06/14/19 0554    Specimen:  Blood Updated:  06/14/19 0700     Glucose 205 mg/dL      BUN 26 mg/dL      Creatinine 0.86 mg/dL      Sodium 140 mmol/L      Potassium 3.2 mmol/L      Chloride 102 mmol/L      CO2 36.0 mmol/L      Calcium 8.3 mg/dL      Total Protein 5.3 g/dL      Albumin 2.50 g/dL      ALT (SGPT) 58 U/L      AST (SGOT) 47 U/L      Alkaline Phosphatase 144 U/L      Total Bilirubin 0.4 mg/dL      eGFR Non African Amer 62 mL/min/1.73      Globulin 2.8 gm/dL      A/G Ratio 0.9 g/dL      BUN/Creatinine Ratio 30.2     Anion Gap 2.0 mmol/L     Narrative:       GFR Normal >60  Chronic Kidney Disease <60  Kidney Failure <15    Magnesium [[id number]]  (Normal) Collected:  06/14/19 0554    Specimen:  Blood Updated:  06/14/19 0658     Magnesium 1.8 mg/dL     Phosphorus [[id number]]  (Normal) Collected:  06/14/19 0554    Specimen:  Blood Updated:  06/14/19 0658     " Phosphorus 2.8 mg/dL     POC Glucose Once [[id number]]  (Abnormal) Collected:  06/14/19 0603    Specimen:  Blood Updated:  06/14/19 0614     Glucose 202 mg/dL      Comment: : JMCCALCIDES Pires ID: [id number]       POC Glucose Once [[id number]]  (Normal) Collected:  06/14/19 0010    Specimen:  Blood Updated:  06/14/19 0021     Glucose 97 mg/dL      Comment: : JMCCALCIDES Pires ID: [id number]       POC Glucose Once [[id number]]  (Normal) Collected:  06/13/19 1611    Specimen:  Blood Updated:  06/13/19 1649     Glucose 81 mg/dL      Comment: : [id number] Avery Davis ID: [id number]       C-reactive Protein [[id number]]  (Abnormal) Collected:  06/13/19 0455    Specimen:  Blood Updated:  06/13/19 1440     C-Reactive Protein 15.69 mg/dL     Cholesterol, Total [[id number]]  (Abnormal) Collected:  06/13/19 0455    Specimen:  Blood Updated:  06/13/19 1426     Total Cholesterol 106 mg/dL     Magnesium [[id number]]  (Normal) Collected:  06/13/19 0455    Specimen:  Blood Updated:  06/13/19 1426     Magnesium 1.7 mg/dL     Phosphorus [[id number]]  (Normal) Collected:  06/13/19 0455    Specimen:  Blood Updated:  06/13/19 1426     Phosphorus 2.8 mg/dL     Triglycerides [[id number]]  (Normal) Collected:  06/13/19 0455    Specimen:  Blood Updated:  06/13/19 1426     Triglycerides 78 mg/dL     Comprehensive Metabolic Panel [[id number]]  (Abnormal) Collected:  06/13/19 1313    Specimen:  Blood Updated:  06/13/19 1401     Glucose 111 mg/dL      BUN 30 mg/dL      Creatinine 0.89 mg/dL      Sodium 143 mmol/L      Potassium 3.1 mmol/L      Chloride 101 mmol/L      CO2 37.0 mmol/L      Calcium 8.6 mg/dL      Total Protein 6.0 g/dL      Albumin 3.00 g/dL      ALT (SGPT) 72 U/L      AST (SGOT) 83 U/L      Alkaline Phosphatase 201 U/L      Total Bilirubin 0.6 mg/dL      eGFR Non African Amer 60 mL/min/1.73      Globulin 3.0 gm/dL      A/G Ratio 1.0 g/dL      BUN/Creatinine Ratio 33.7     Anion Gap 5.0 mmol/L      Narrative:       GFR Normal >60  Chronic Kidney Disease <60  Kidney Failure <15    POC Glucose Once [[id number]]  (Normal) Collected:  06/13/19 1125    Specimen:  Blood Updated:  06/13/19 1206     Glucose 94 mg/dL      Comment: : MADELEINE NaranjoMeter ID: [id number]               Xr Chest 1 View    Result Date: 6/12/2019  Narrative: EXAMINATION: XR CHEST 1 VW-. 6/12/2019 8:35 PM CDT  CHEST, ONE VIEW:  HISTORY: Central venous catheter placement  COMPARISON: None  A single frontal chest radiograph was obtained.  FINDINGS:  Right IJ deep line well-positioned in superior vena cava region without pneumothorax.  There is patchy bilateral perihilar and lower lobe infiltrates with left lower lobe consolidation.  Acute infectious/inflammatory change, pneumonia considered as well as pulmonary edema.                                  Impression: 1. Right IJ deep line well-positioned without pneumothorax or postprocedural complications. 2. Bilateral parenchymal infiltrates.  This report was finalized on 06/12/2019 20:36 by Dr. Hever Williamson MD.                        Physical Examination:        Physical Exam   Constitutional: She is oriented to person, place, and time. She appears well-developed. No distress.   HENT:   Head: Normocephalic and atraumatic.   Mouth/Throat: Mucous membranes are dry.   Eyes: Conjunctivae and EOM are normal. Pupils are equal, round, and reactive to light.   Neck: Normal range of motion. Neck supple.   Cardiovascular: Regular rhythm, normal heart sounds and intact distal pulses. Tachycardia present.   Pulmonary/Chest: Effort normal and breath sounds normal. No respiratory distress. She has no wheezes.   Abdominal: Soft. Bowel sounds are normal. There is tenderness.   Musculoskeletal:   General weakness   Neurological: She is alert and oriented to person, place, and time.   Skin: Skin is warm and dry.   Psychiatric: Her behavior is normal. Her mood appears anxious.         Assessment:           Plan:        1. S/p Duodenal Ulcer perforation repair  2. Chronic Kidney Disease  3. Hypertension  4. Protein calorie malnutrition  5. Anemia  6. Sciatica   7. Anxiety    Continue with current treatments.  Monitor counts.  Aggressive therapy as tolerated by patient.  Continue antibiotics. Maintain patient safety.  TPN continued.  B12, Folate, Iron panel.  Labs Monday. Buspar 10mg PO BID      Electronically signed by HANK Vicente on 6/14/2019 at 9:18 AM     I have discussed the care of Suzy Larkin, including pertinent history and exam findings, with the nurse practitioner.    I have seen and examined the patient and the key elements of all parts of the encounter have been performed by me.  I agree with the assessment, plan and orders as documented by HANK Vicente, after I modified the exam findings and the plan of treatments and the final version is my approved version of the assessment.        Electronically signed by Shorty Whalen MD on 6/14/2019 at 4:08 PM

## 2019-06-15 LAB
ALBUMIN SERPL-MCNC: 2.5 G/DL (ref 3.5–5)
ALBUMIN/GLOB SERPL: 0.9 G/DL (ref 1.1–2.5)
ALP SERPL-CCNC: 112 U/L (ref 24–120)
ALT SERPL W P-5'-P-CCNC: 45 U/L (ref 0–54)
ANION GAP SERPL CALCULATED.3IONS-SCNC: 4 MMOL/L (ref 4–13)
AST SERPL-CCNC: 45 U/L (ref 7–45)
BILIRUB SERPL-MCNC: 0.3 MG/DL (ref 0.1–1)
BUN BLD-MCNC: 25 MG/DL (ref 5–21)
BUN/CREAT SERPL: 34.7 (ref 7–25)
CA-I BLD-MCNC: 4.75 MG/DL (ref 4.6–5.4)
CALCIUM SPEC-SCNC: 8.3 MG/DL (ref 8.4–10.4)
CHLORIDE SERPL-SCNC: 102 MMOL/L (ref 98–110)
CO2 SERPL-SCNC: 34 MMOL/L (ref 24–31)
CREAT BLD-MCNC: 0.72 MG/DL (ref 0.5–1.4)
GFR SERPL CREATININE-BSD FRML MDRD: 77 ML/MIN/1.73
GLOBULIN UR ELPH-MCNC: 2.7 GM/DL
GLUCOSE BLD-MCNC: 197 MG/DL (ref 70–100)
GLUCOSE BLDC GLUCOMTR-MCNC: 182 MG/DL (ref 70–130)
GLUCOSE BLDC GLUCOMTR-MCNC: 198 MG/DL (ref 70–130)
Lab: NORMAL
MAGNESIUM SERPL-MCNC: 1.7 MG/DL (ref 1.4–2.2)
PHOSPHATE SERPL-MCNC: 2.3 MG/DL (ref 2.5–4.5)
POTASSIUM BLD-SCNC: 3.1 MMOL/L (ref 3.5–5.3)
PROT SERPL-MCNC: 5.2 G/DL (ref 6.3–8.7)
SODIUM BLD-SCNC: 140 MMOL/L (ref 135–145)

## 2019-06-15 PROCEDURE — 84100 ASSAY OF PHOSPHORUS: CPT | Performed by: INTERNAL MEDICINE

## 2019-06-15 PROCEDURE — 25010000002 MEROPENEM: Performed by: INTERNAL MEDICINE

## 2019-06-15 PROCEDURE — 25010000002 HYDROMORPHONE 1 MG/ML SOLUTION: Performed by: INTERNAL MEDICINE

## 2019-06-15 PROCEDURE — 25010000002 FUROSEMIDE PER 20 MG: Performed by: INTERNAL MEDICINE

## 2019-06-15 PROCEDURE — 80053 COMPREHEN METABOLIC PANEL: CPT | Performed by: INTERNAL MEDICINE

## 2019-06-15 PROCEDURE — 82962 GLUCOSE BLOOD TEST: CPT

## 2019-06-15 PROCEDURE — 25010000002 MORPHINE PER 10 MG: Performed by: INTERNAL MEDICINE

## 2019-06-15 PROCEDURE — 83735 ASSAY OF MAGNESIUM: CPT | Performed by: INTERNAL MEDICINE

## 2019-06-15 PROCEDURE — 82330 ASSAY OF CALCIUM: CPT

## 2019-06-15 PROCEDURE — 25010000002 VANCOMYCIN: Performed by: INTERNAL MEDICINE

## 2019-06-15 PROCEDURE — 97535 SELF CARE MNGMENT TRAINING: CPT

## 2019-06-15 PROCEDURE — 25010000002 IRON SUCROSE PER 1 MG: Performed by: NURSE PRACTITIONER

## 2019-06-15 PROCEDURE — 25010000003 POTASSIUM CHLORIDE PER 2 MEQ: Performed by: NURSE PRACTITIONER

## 2019-06-15 PROCEDURE — 63710000001 INSULIN LISPRO (HUMAN) PER 5 UNITS: Performed by: INTERNAL MEDICINE

## 2019-06-15 PROCEDURE — 97530 THERAPEUTIC ACTIVITIES: CPT

## 2019-06-15 RX ORDER — POTASSIUM CHLORIDE 29.8 MG/ML
20 INJECTION INTRAVENOUS
Status: DISPENSED | OUTPATIENT
Start: 2019-06-15 | End: 2019-06-15

## 2019-06-15 RX ORDER — GUAIFENESIN 600 MG/1
600 TABLET, EXTENDED RELEASE ORAL EVERY 12 HOURS SCHEDULED
Status: DISCONTINUED | OUTPATIENT
Start: 2019-06-15 | End: 2019-06-18 | Stop reason: ALTCHOICE

## 2019-06-15 NOTE — PROGRESS NOTES
"Pharmacy Dosing Service  Pharmacokinetics  Vancomycin Follow-up Evaluation    Assessment/Action/Plan:  Trough drawn ~24  hours post dose and was subtherapeutic for the indication.  Increased the dose to Vancomycin 1500 IV Q24H.  SCr stable.   Pharmacy will continue to monitor renal function and adjust dose accordingly.     Subjective:  Suzy Larkin is a 87 y.o. female currently on Vancomycin 1250 mg IV every 24 hours for the treatment of PNA, patient was on this dose at the previous facility.   (Current vancomycin end date: 6/19).    Objective:  Ht: 162.6 cm (64\"); Wt: 64.5 kg (142 lb 4.8 oz)  Estimated Creatinine Clearance: 46.9 mL/min (by C-G formula based on SCr of 0.86 mg/dL).   Lab Results   Component Value Date    CREATININE 0.86 06/14/2019    CREATININE 0.89 06/13/2019    CREATININE 0.86 06/13/2019      Lab Results   Component Value Date    WBC 11.15 (H) 06/13/2019         Lab Results   Component Value Date    VANCOTROUGH 12.01 06/14/2019       Culture Results:  Microbiology Results (last 10 days)       ** No results found for the last 240 hours. **            Tena Dorado, [facility]   06/14/19 11:19 PM    "

## 2019-06-15 NOTE — PROGRESS NOTES
[facility]  Juan R Whalen M.D.  VASILIY Whalen M.D.  HANK Colin APRN      Internal Medicine Progress Note    6/15/2019   8:34 AM    Name:  Suzy Larkin  MRN:    [id number]     Acct:     [id number]   Room:  [address] [place] [address] Day: 0     Admit Date: 6/12/2019  7:31 PM  PCP: Provider, No Known    Subjective:     C/C: nutrition replacement and oxygen weaning    Interval History: Status: stayed the same.  Resting in bed, no family present at this time.  C/O weakness and oral pain with intake.  Encouraged increasing activity. C/O congested.  Abdominal incision with scant serous drainage, non tender. Requested MOM.  Low grade temp.    Review of Systems   Constitution: Positive for weakness. Negative for chills, decreased appetite and fever.   HENT: Negative for congestion, ear pain, nosebleeds and sore throat.         Oral pain with intake   Eyes: Negative for blurred vision and discharge.   Cardiovascular: Negative for chest pain and palpitations.   Respiratory: Negative for cough, shortness of breath and wheezing.    Skin: Negative for itching and rash.   Musculoskeletal: Positive for back pain and muscle weakness. Negative for arthritis.   Gastrointestinal: Positive for abdominal pain and bowel incontinence. Negative for constipation, diarrhea, nausea and vomiting.   Genitourinary: Negative for dysuria and urgency.   Neurological: Negative for dizziness and numbness.   Psychiatric/Behavioral: Negative for altered mental status and depression. The patient has insomnia.          Medications:     Allergies: Allergies no known allergies    Current Meds:   Current Facility-Administered Medications:   •  acetaminophen (TYLENOL) tablet 325 mg, 325 mg, Oral, Q6H, Shorty Whalen MD  •  Adult Standard Central TPN, , Intravenous, Q24H (TPN) **AND** fat emulsion (INTRALIPID,LIPOSYN) 20 % infusion 50 g, 250 mL, Intravenous, Once per day on Mon Thu, Shorty Whalen MD  •   benzonatate (TESSALON) capsule 100 mg, 100 mg, Oral, TID PRN, Shorty Whalen MD  •  busPIRone (BUSPAR) tablet 10 mg, 10 mg, Oral, Q12H, Shorty Whalen MD  •  dextrose (D50W) 25 g/ 50mL Intravenous Solution 25 g, 25 g, Intravenous, Q15 Min PRN, Shorty Whalen MD  •  dextrose (GLUTOSE) oral gel 15 g, 15 g, Oral, Q15 Min PRN, Shorty Whalen MD  •  furosemide (LASIX) injection 20 mg, 20 mg, Intravenous, BID, Shorty Whalen MD  •  glucagon (human recombinant) (GLUCAGEN DIAGNOSTIC) injection 1 mg, 1 mg, Subcutaneous, PRN, Shorty Whalen MD  •  HYDROcodone-acetaminophen (NORCO) 5-325 MG per tablet 1 tablet, 1 tablet, Oral, Q4H PRN, Shorty Whalen MD  •  HYDROcodone-acetaminophen (NORCO) 5-325 MG per tablet 2 tablet, 2 tablet, Oral, Q4H PRN, Shorty Whalen MD  •  HYDROmorphone (DILAUDID) injection 0.25 mg, 0.25 mg, Intravenous, Q3H PRN, Shorty Whalen MD  •  insulin lispro (humaLOG) injection 2-7 Units, 2-7 Units, Subcutaneous, Q6H, Shorty Whalen MD  •  ipratropium-albuterol (DUO-NEB) nebulizer solution 3 mL, 3 mL, Nebulization, Q4H While Awake - RT, Shorty Whalen MD  •  lidocaine-Maalox-diphenhydramine (MIRACLE MOUTHWASH) oral suspension 5 mL, 5 mL, Swish & Swallow, 4x Daily PRN, Shorty Whalen MD  •  losartan (COZAAR) tablet 25 mg, 25 mg, Oral, Q24H, Shorty Whalen MD  •  meropenem (MERREM) 1 g/100 mL 0.9% NS VTB (mbp), 1 g, Intravenous, Q8H, Shorty Whalen MD  •  metoprolol tartrate (LOPRESSOR) tablet 25 mg, 25 mg, Oral, Q12H, Shorty Whalen MD  •  morphine injection 0.5 mg, 0.5 mg, Intravenous, Q30 Min PRN, Shorty Whalen MD  •  nystatin (MYCOSTATIN) [id number] UNIT/ML suspension 500,000 Units, 5 mL, Swish & Swallow, 4x Daily, Shorty Whalen MD  •  ondansetron (ZOFRAN) injection 4 mg, 4 mg, Intravenous, Q6H PRN, Shorty Whalen MD  •  pantoprazole (PROTONIX) injection 40 mg, 40 mg,  "Intravenous, Q12H, Shorty Whalen MD  •  sodium chloride 0.9 % flush 10 mL, 10 mL, Intracatheter, Q8H, Shorty Whalen MD  •  sodium chloride 0.9 % flush 10 mL, 10 mL, Intracatheter, PRN, Shorty Whalen MD  •  vancomycin 1500 mg/500 mL 0.9% NS IVPB ([facility]), 1,500 mg, Intravenous, Q24H, Shroty Whalen MD  •  vitamin D (ERGOCALCIFEROL) capsule 50,000 Units, 50,000 Units, Oral, Q7 Days, Shorty Whalen MD    Data:     Code Status:    There are no questions and answers to display.       No family history on file.    Social History     Socioeconomic History   • Marital status: Single     Spouse name: Not on file   • Number of children: Not on file   • Years of education: Not on file   • Highest education level: Not on file       Vitals:  Ht 162.6 cm (64\")   Wt 64.5 kg (142 lb 4.8 oz)   BMI 24.43 kg/m²     T 99.3 P 102 R 18 /61  Sp02 96% (NC)      I/O (24Hr):  No intake or output data in the 24 hours ending 06/15/19 0834    Labs and imaging:      Lab Results (last 24 hours)     Procedure Component Value Units Date/Time    Comprehensive Metabolic Panel [[id number]]  (Abnormal) Collected:  06/15/19 0425    Specimen:  Blood Updated:  06/15/19 0449     Glucose 197 mg/dL      BUN 25 mg/dL      Creatinine 0.72 mg/dL      Sodium 140 mmol/L      Potassium 3.1 mmol/L      Chloride 102 mmol/L      CO2 34.0 mmol/L      Calcium 8.3 mg/dL      Total Protein 5.2 g/dL      Albumin 2.50 g/dL      ALT (SGPT) 45 U/L      AST (SGOT) 45 U/L      Alkaline Phosphatase 112 U/L      Total Bilirubin 0.3 mg/dL      eGFR Non African Amer 77 mL/min/1.73      Globulin 2.7 gm/dL      A/G Ratio 0.9 g/dL      BUN/Creatinine Ratio 34.7     Anion Gap 4.0 mmol/L     Narrative:       GFR Normal >60  Chronic Kidney Disease <60  Kidney Failure <15    Magnesium [[id number]]  (Normal) Collected:  06/15/19 0425    Specimen:  Blood Updated:  06/15/19 0449     Magnesium 1.7 mg/dL     Phosphorus [[id number]]  (Abnormal) " Collected:  06/15/19 0425    Specimen:  Blood Updated:  06/15/19 0449     Phosphorus 2.3 mg/dL     Vancomycin, Trough Please draw prior to the 2130 dose, thank you. [[id number]]  (Normal) Collected:  06/14/19 2110    Specimen:  Blood Updated:  06/14/19 2208     Vancomycin Trough 12.01 mcg/mL     POC Glucose Once [[id number]]  (Abnormal) Collected:  06/14/19 1735    Specimen:  Blood Updated:  06/14/19 1747     Glucose 215 mg/dL      Comment: : EBLUME Blume EliseMeter ID: [id number]       POC Glucose Once [[id number]]  (Abnormal) Collected:  06/14/19 1201    Specimen:  Blood Updated:  06/14/19 1212     Glucose 229 mg/dL      Comment: : EBLUME Blume EliseMeter ID: [id number]       Vitamin B12 [[id number]]  (Normal) Collected:  06/14/19 1001    Specimen:  Blood Updated:  06/14/19 1132     Vitamin B-12 667 pg/mL     Folate [[id number]]  (Normal) Collected:  06/14/19 1001    Specimen:  Blood Updated:  06/14/19 1132     Folate 9.85 ng/mL     Iron Profile [[id number]]  (Abnormal) Collected:  06/14/19 1001    Specimen:  Blood Updated:  06/14/19 1035     Iron 19 mcg/dL      TIBC 190 mcg/dL      Iron Saturation 10 %             Xr Chest 1 View    Result Date: 6/12/2019  Narrative: EXAMINATION: XR CHEST 1 VW-. 6/12/2019 8:35 PM CDT  CHEST, ONE VIEW:  HISTORY: Central venous catheter placement  COMPARISON: None  A single frontal chest radiograph was obtained.  FINDINGS:  Right IJ deep line well-positioned in superior vena cava region without pneumothorax.  There is patchy bilateral perihilar and lower lobe infiltrates with left lower lobe consolidation.  Acute infectious/inflammatory change, pneumonia considered as well as pulmonary edema.                                  Impression: 1. Right IJ deep line well-positioned without pneumothorax or postprocedural complications. 2. Bilateral parenchymal infiltrates.  This report was finalized on 06/12/2019 20:36 by Dr. Hever Williamson MD.        Physical Examination:         Physical Exam   Constitutional: She is oriented to person, place, and time. She appears well-developed. No distress.   HENT:   Head: Normocephalic and atraumatic.   Mouth/Throat: Mucous membranes are dry. Oropharyngeal exudate present.   Eyes: Conjunctivae and EOM are normal. Pupils are equal, round, and reactive to light.   Neck: Normal range of motion. Neck supple.   Cardiovascular: Regular rhythm, normal heart sounds and intact distal pulses. Tachycardia present.   Pulmonary/Chest: Effort normal and breath sounds normal. No respiratory distress. She has no wheezes.   Abdominal: Soft. Bowel sounds are normal. There is no tenderness.   Abdominal Incision with scant serous drainage   Musculoskeletal:   General weakness   Neurological: She is alert and oriented to person, place, and time.   Skin: Skin is warm and dry.   Psychiatric: Her behavior is normal. Her mood appears anxious.         Assessment:          Plan:        1. S/p Duodenal Ulcer perforation repair  2. Chronic Kidney Disease  3. Hypertension  4. Protein calorie malnutrition  5. Iron deficiency anemia  6. Sciatica   7. Anxiety    Continue with current treatments.  Monitor counts.  Aggressive therapy as tolerated by patient.  Continue antibiotics. Maintain patient safety.  TPN continued.  Labs Monday. Continue miracle mouth wash, nystatin swish and swallow, and needs frequent oral care. Replace potassium and monitor. Replace iron with Venofer. Add Mucinex.and MOM.      Electronically signed by HANK Coronel on 6/15/2019 at 8:34 AM

## 2019-06-16 VITALS — WEIGHT: 142.3 LBS | HEIGHT: 64 IN | BODY MASS INDEX: 24.3 KG/M2

## 2019-06-16 LAB
ANION GAP SERPL CALCULATED.3IONS-SCNC: 4 MMOL/L (ref 4–13)
BUN BLD-MCNC: 25 MG/DL (ref 5–21)
BUN/CREAT SERPL: 36.8 (ref 7–25)
CALCIUM SPEC-SCNC: 8.2 MG/DL (ref 8.4–10.4)
CHLORIDE SERPL-SCNC: 104 MMOL/L (ref 98–110)
CO2 SERPL-SCNC: 31 MMOL/L (ref 24–31)
CREAT BLD-MCNC: 0.68 MG/DL (ref 0.5–1.4)
GFR SERPL CREATININE-BSD FRML MDRD: 82 ML/MIN/1.73
GLUCOSE BLD-MCNC: 135 MG/DL (ref 70–100)
GLUCOSE BLDC GLUCOMTR-MCNC: 146 MG/DL (ref 70–130)
GLUCOSE BLDC GLUCOMTR-MCNC: 147 MG/DL (ref 70–130)
GLUCOSE BLDC GLUCOMTR-MCNC: 153 MG/DL (ref 70–130)
GLUCOSE BLDC GLUCOMTR-MCNC: 167 MG/DL (ref 70–130)
MAGNESIUM SERPL-MCNC: 2 MG/DL (ref 1.4–2.2)
PHOSPHATE SERPL-MCNC: 2.3 MG/DL (ref 2.5–4.5)
POTASSIUM BLD-SCNC: 3.7 MMOL/L (ref 3.5–5.3)
SODIUM BLD-SCNC: 139 MMOL/L (ref 135–145)
VANCOMYCIN TROUGH SERPL-MCNC: 20.07 MCG/ML (ref 10–20)

## 2019-06-16 PROCEDURE — 97110 THERAPEUTIC EXERCISES: CPT

## 2019-06-16 PROCEDURE — 25010000002 FUROSEMIDE PER 20 MG: Performed by: INTERNAL MEDICINE

## 2019-06-16 PROCEDURE — 25010000002 IRON SUCROSE PER 1 MG: Performed by: NURSE PRACTITIONER

## 2019-06-16 PROCEDURE — 25010000002 VANCOMYCIN: Performed by: INTERNAL MEDICINE

## 2019-06-16 PROCEDURE — 84100 ASSAY OF PHOSPHORUS: CPT | Performed by: INTERNAL MEDICINE

## 2019-06-16 PROCEDURE — 63710000001 INSULIN LISPRO (HUMAN) PER 5 UNITS: Performed by: INTERNAL MEDICINE

## 2019-06-16 PROCEDURE — 83735 ASSAY OF MAGNESIUM: CPT | Performed by: INTERNAL MEDICINE

## 2019-06-16 PROCEDURE — 80048 BASIC METABOLIC PNL TOTAL CA: CPT | Performed by: INTERNAL MEDICINE

## 2019-06-16 PROCEDURE — 82962 GLUCOSE BLOOD TEST: CPT

## 2019-06-16 PROCEDURE — 80202 ASSAY OF VANCOMYCIN: CPT | Performed by: INTERNAL MEDICINE

## 2019-06-16 PROCEDURE — 25010000002 MEROPENEM: Performed by: INTERNAL MEDICINE

## 2019-06-16 PROCEDURE — 97116 GAIT TRAINING THERAPY: CPT

## 2019-06-16 RX ORDER — POTASSIUM CHLORIDE 750 MG/1
20 CAPSULE, EXTENDED RELEASE ORAL DAILY
Status: DISCONTINUED | OUTPATIENT
Start: 2019-06-16 | End: 2019-06-19 | Stop reason: HOSPADM

## 2019-06-16 NOTE — PROGRESS NOTES
[facility]  THA Quiles M.D.  HANK Colin APRN      Internal Medicine Progress Note    6/16/2019   10:04 AM    Name:  Suzy Larkin  MRN:    [id number]     Acct:     [id number]   Room:  [address] [place] [id number] Day: 0     Admit Date: 6/12/2019  7:31 PM  PCP: Provider, No Known    Subjective:     C/C: nutrition replacement and oxygen weaning    Interval History: Status: stayed the same.  Resting in chair, no family present at this time.  Continues to C/O weakness and oral pain with intake.  Encouraged increasing activity. Abdominal incision scabbed and non tender. Afebrile. Potassium normalized, 3.7.     Review of Systems   Constitution: Positive for weakness. Negative for chills, decreased appetite and fever.   HENT: Negative for congestion, ear pain, nosebleeds and sore throat.         Oral pain with intake   Eyes: Negative for blurred vision and discharge.   Cardiovascular: Negative for chest pain and palpitations.   Respiratory: Negative for cough, shortness of breath and wheezing.    Skin: Negative for itching and rash.   Musculoskeletal: Positive for back pain and muscle weakness. Negative for arthritis.   Gastrointestinal: Positive for abdominal pain and bowel incontinence. Negative for constipation, diarrhea, nausea and vomiting.   Genitourinary: Negative for dysuria and urgency.   Neurological: Negative for dizziness and numbness.   Psychiatric/Behavioral: Negative for altered mental status and depression. The patient has insomnia.          Medications:     Allergies: Allergies no known allergies    Current Meds:   Current Facility-Administered Medications:   •  acetaminophen (TYLENOL) tablet 325 mg, 325 mg, Oral, Q6H, Shorty Whalen MD  •  Adult Standard Central TPN, , Intravenous, Q24H (TPN) **AND** fat emulsion (INTRALIPID,LIPOSYN) 20 % infusion 50 g, 250 mL, Intravenous, Once per day on Mon Thu, Shorty Whalen MD  •   benzonatate (TESSALON) capsule 100 mg, 100 mg, Oral, TID PRN, Shorty Whalen MD  •  busPIRone (BUSPAR) tablet 10 mg, 10 mg, Oral, Q12H, Shorty Whalen MD  •  dextrose (D50W) 25 g/ 50mL Intravenous Solution 25 g, 25 g, Intravenous, Q15 Min PRN, Shorty Whalen MD  •  dextrose (GLUTOSE) oral gel 15 g, 15 g, Oral, Q15 Min PRN, Shorty Whalen MD  •  furosemide (LASIX) injection 20 mg, 20 mg, Intravenous, BID, Shorty Whalen MD  •  glucagon (human recombinant) (GLUCAGEN DIAGNOSTIC) injection 1 mg, 1 mg, Subcutaneous, PRN, Shorty Whalen MD  •  guaiFENesin (MUCINEX) 12 hr tablet 600 mg, 600 mg, Oral, Q12H, Octavia Fraser, HANK  •  HYDROcodone-acetaminophen (NORCO) 5-325 MG per tablet 1 tablet, 1 tablet, Oral, Q4H PRN, Shorty Whalen MD  •  HYDROcodone-acetaminophen (NORCO) 5-325 MG per tablet 2 tablet, 2 tablet, Oral, Q4H PRN, Shorty Whalen MD  •  HYDROmorphone (DILAUDID) injection 0.25 mg, 0.25 mg, Intravenous, Q3H PRN, Shorty Whalen MD  •  insulin lispro (humaLOG) injection 2-7 Units, 2-7 Units, Subcutaneous, Q6H, Shorty Whalen MD  •  ipratropium-albuterol (DUO-NEB) nebulizer solution 3 mL, 3 mL, Nebulization, Q4H While Awake - RT, Shorty Whalen MD  •  iron sucrose (VENOFER) 400 mg in sodium chloride 0.9 % 100 mL IVPB, 400 mg, Intravenous, Once **FOLLOWED BY** iron sucrose (VENOFER) 300 mg in sodium chloride 0.9 % 100 mL IVPB, 300 mg, Intravenous, Q24H, Octavia Fraser APRN  •  lidocaine-Maalox-diphenhydramine (MIRACLE MOUTHWASH) oral suspension 5 mL, 5 mL, Swish & Swallow, 4x Daily PRN, Shorty Whalen MD  •  losartan (COZAAR) tablet 25 mg, 25 mg, Oral, Q24H, Shorty Whalen MD  •  magnesium hydroxide (MILK OF MAGNESIA) suspension 2400 mg/10mL 10 mL, 10 mL, Oral, Daily PRN, Octavia Fraser, APRN  •  meropenem (MERREM) 1 g/100 mL 0.9% NS VTB (mbp), 1 g, Intravenous, Q8H, Shoryt Whalen MD  •  metoprolol tartrate  "(LOPRESSOR) tablet 25 mg, 25 mg, Oral, Q12H, Shorty Whalen MD  •  morphine injection 0.5 mg, 0.5 mg, Intravenous, Q30 Min PRN, Shorty Whalen MD  •  nystatin (MYCOSTATIN) 642883 UNIT/ML suspension 500,000 Units, 5 mL, Swish & Swallow, 4x Daily, Shorty Whalen MD  •  ondansetron (ZOFRAN) injection 4 mg, 4 mg, Intravenous, Q6H PRN, Shorty Wahlen MD  •  pantoprazole (PROTONIX) injection 40 mg, 40 mg, Intravenous, Q12H, Shorty Whalen MD  •  sodium chloride 0.9 % flush 10 mL, 10 mL, Intracatheter, Q8H, Shorty Whalen MD  •  sodium chloride 0.9 % flush 10 mL, 10 mL, Intracatheter, PRN, Shorty Whalen MD  •  vancomycin 1500 mg/500 mL 0.9% NS IVPB ([facility]), 1,500 mg, Intravenous, Q24H, Shorty Whalen MD  •  vitamin D (ERGOCALCIFEROL) capsule 50,000 Units, 50,000 Units, Oral, Q7 Days, Shorty Whalen MD    Data:     Code Status:    There are no questions and answers to display.       No family history on file.    Social History     Socioeconomic History   • Marital status: Single     Spouse name: Not on file   • Number of children: Not on file   • Years of education: Not on file   • Highest education level: Not on file       Vitals:  Ht 162.6 cm (64\")   Wt 64.5 kg (142 lb 4.8 oz)   BMI 24.43 kg/m²     T 98.8 P 86 R 20 BP 84/60  Sp02 95% (NC)      I/O (24Hr):  No intake or output data in the 24 hours ending 06/16/19 1004    Labs and imaging:      Lab Results (last 24 hours)     Procedure Component Value Units Date/Time    POC Glucose Once [[id number]]  (Abnormal) Collected:  06/16/19 0600    Specimen:  Blood Updated:  06/16/19 0614     Glucose 153 mg/dL      Comment: : STELLA Wilson LisaMeter ID: [id number]       Basic Metabolic Panel [[id number]]  (Abnormal) Collected:  06/16/19 0434    Specimen:  Blood Updated:  06/16/19 0550     Glucose 135 mg/dL      BUN 25 mg/dL      Creatinine 0.68 mg/dL      Sodium 139 mmol/L      Potassium 3.7 mmol/L  "     Chloride 104 mmol/L      CO2 31.0 mmol/L      Calcium 8.2 mg/dL      eGFR Non African Amer 82 mL/min/1.73      BUN/Creatinine Ratio 36.8     Anion Gap 4.0 mmol/L     Narrative:       GFR Normal >60  Chronic Kidney Disease <60  Kidney Failure <15    Magnesium [[id number]]  (Normal) Collected:  06/16/19 0434    Specimen:  Blood Updated:  06/16/19 0550     Magnesium 2.0 mg/dL     Phosphorus [[id number]]  (Abnormal) Collected:  06/16/19 0434    Specimen:  Blood Updated:  06/16/19 0550     Phosphorus 2.3 mg/dL     POC Glucose Once [[id number]]  (Abnormal) Collected:  06/15/19 2359    Specimen:  Blood Updated:  06/16/19 0011     Glucose 167 mg/dL      Comment: : TSELLA SinghaMeter ID: [id number]       POC Glucose Once [[id number]]  (Abnormal) Collected:  06/15/19 1643    Specimen:  Blood Updated:  06/15/19 1654     Glucose 182 mg/dL      Comment: : PRESLEY ColindreseMeter ID: [id number]       POC Glucose Once [[id number]]  (Abnormal) Collected:  06/15/19 1105    Specimen:  Blood Updated:  06/15/19 1116     Glucose 198 mg/dL      Comment: : PRESLEY Baumannaniceto EliseMeter ID: [id number]               Xr Chest 1 View    Result Date: 6/12/2019  Narrative: EXAMINATION: XR CHEST 1 VW-. 6/12/2019 8:35 PM CDT  CHEST, ONE VIEW:  HISTORY: Central venous catheter placement  COMPARISON: None  A single frontal chest radiograph was obtained.  FINDINGS:  Right IJ deep line well-positioned in superior vena cava region without pneumothorax.  There is patchy bilateral perihilar and lower lobe infiltrates with left lower lobe consolidation.  Acute infectious/inflammatory change, pneumonia considered as well as pulmonary edema.                                  Impression: 1. Right IJ deep line well-positioned without pneumothorax or postprocedural complications. 2. Bilateral parenchymal infiltrates.  This report was finalized on 06/12/2019 20:36 by Dr. Hever Williamson MD.        Physical Examination:        Physical  Exam   Constitutional: She is oriented to person, place, and time. She appears well-developed. No distress.   HENT:   Head: Normocephalic and atraumatic.   Mouth/Throat: Mucous membranes are dry. Oropharyngeal exudate present.   Eyes: Conjunctivae and EOM are normal. Pupils are equal, round, and reactive to light.   Neck: Normal range of motion. Neck supple.   Cardiovascular: Regular rhythm, normal heart sounds and intact distal pulses. Tachycardia present.   Pulmonary/Chest: Effort normal and breath sounds normal. No respiratory distress. She has no wheezes.   Abdominal: Soft. Bowel sounds are normal. There is no tenderness.   Abdominal Incision with scant serous drainage   Musculoskeletal:   General weakness   Neurological: She is alert and oriented to person, place, and time.   Skin: Skin is warm and dry.   Psychiatric: Her behavior is normal. Her mood appears anxious.         Assessment:          Plan:        1. S/p Duodenal Ulcer perforation repair  2. Chronic Kidney Disease  3. Hypertension  4. Protein calorie malnutrition  5. Iron deficiency anemia  6. Sciatica   7. Anxiety    Continue with current treatments.  Monitor counts.  Aggressive therapy as tolerated by patient.  Continue antibiotics. Maintain patient safety.  TPN continued.  Labs Monday. Continue miracle mouth wash, nystatin swish and swallow, and needs frequent oral care. Monitor potassium closely, add daily replacement with 20 mEq while being diuressed. Continue Venofer day 2 of 3.       Electronically signed by HANK Coronel on 6/16/2019 at 10:04 AM

## 2019-06-17 LAB
ALBUMIN SERPL-MCNC: 2.9 G/DL (ref 3.5–5)
ALBUMIN/GLOB SERPL: 1 G/DL (ref 1.1–2.5)
ALP SERPL-CCNC: 108 U/L (ref 24–120)
ALT SERPL W P-5'-P-CCNC: 37 U/L (ref 0–54)
ANION GAP SERPL CALCULATED.3IONS-SCNC: 6 MMOL/L (ref 4–13)
ANION GAP SERPL CALCULATED.3IONS-SCNC: 6 MMOL/L (ref 4–13)
AST SERPL-CCNC: 52 U/L (ref 7–45)
BASOPHILS # BLD AUTO: 0.02 10*3/MM3 (ref 0–0.2)
BASOPHILS NFR BLD AUTO: 0.2 % (ref 0–2)
BILIRUB SERPL-MCNC: 0.3 MG/DL (ref 0.1–1)
BUN BLD-MCNC: 27 MG/DL (ref 5–21)
BUN BLD-MCNC: 28 MG/DL (ref 5–21)
BUN/CREAT SERPL: 31.8 (ref 7–25)
BUN/CREAT SERPL: 34.1 (ref 7–25)
CA-I BLD-MCNC: 4.85 MG/DL (ref 4.6–5.4)
CALCIUM SPEC-SCNC: 8.6 MG/DL (ref 8.4–10.4)
CALCIUM SPEC-SCNC: 8.7 MG/DL (ref 8.4–10.4)
CHLORIDE SERPL-SCNC: 105 MMOL/L (ref 98–110)
CHLORIDE SERPL-SCNC: 105 MMOL/L (ref 98–110)
CO2 SERPL-SCNC: 29 MMOL/L (ref 24–31)
CO2 SERPL-SCNC: 30 MMOL/L (ref 24–31)
CREAT BLD-MCNC: 0.82 MG/DL (ref 0.5–1.4)
CREAT BLD-MCNC: 0.85 MG/DL (ref 0.5–1.4)
CRP SERPL-MCNC: 3.7 MG/DL (ref 0–0.99)
DEPRECATED RDW RBC AUTO: 53.2 FL (ref 40–54)
EOSINOPHIL # BLD AUTO: 0.74 10*3/MM3 (ref 0–0.7)
EOSINOPHIL NFR BLD AUTO: 6.2 % (ref 0–4)
ERYTHROCYTE [DISTWIDTH] IN BLOOD BY AUTOMATED COUNT: 17.1 % (ref 12–15)
ERYTHROCYTE [SEDIMENTATION RATE] IN BLOOD: 15 MM/HR (ref 0–20)
GFR SERPL CREATININE-BSD FRML MDRD: 63 ML/MIN/1.73
GFR SERPL CREATININE-BSD FRML MDRD: 66 ML/MIN/1.73
GLOBULIN UR ELPH-MCNC: 3 GM/DL
GLUCOSE BLD-MCNC: 136 MG/DL (ref 70–100)
GLUCOSE BLD-MCNC: 142 MG/DL (ref 70–100)
GLUCOSE BLDC GLUCOMTR-MCNC: 141 MG/DL (ref 70–130)
GLUCOSE BLDC GLUCOMTR-MCNC: 149 MG/DL (ref 70–130)
GLUCOSE BLDC GLUCOMTR-MCNC: 151 MG/DL (ref 70–130)
GLUCOSE BLDC GLUCOMTR-MCNC: 155 MG/DL (ref 70–130)
GLUCOSE BLDC GLUCOMTR-MCNC: 164 MG/DL (ref 70–130)
GLUCOSE BLDC GLUCOMTR-MCNC: 200 MG/DL (ref 70–130)
HCT VFR BLD AUTO: 25.4 % (ref 37–47)
HGB BLD-MCNC: 7.8 G/DL (ref 12–16)
IMM GRANULOCYTES # BLD AUTO: 0.18 10*3/MM3 (ref 0–0.05)
IMM GRANULOCYTES NFR BLD AUTO: 1.5 % (ref 0–5)
LYMPHOCYTES # BLD AUTO: 1.42 10*3/MM3 (ref 0.72–4.86)
LYMPHOCYTES NFR BLD AUTO: 11.8 % (ref 15–45)
Lab: NORMAL
MCH RBC QN AUTO: 26.9 PG (ref 28–32)
MCHC RBC AUTO-ENTMCNC: 30.7 G/DL (ref 33–36)
MCV RBC AUTO: 87.6 FL (ref 82–98)
MONOCYTES # BLD AUTO: 0.76 10*3/MM3 (ref 0.19–1.3)
MONOCYTES NFR BLD AUTO: 6.3 % (ref 4–12)
NEUTROPHILS # BLD AUTO: 8.9 10*3/MM3 (ref 1.87–8.4)
NEUTROPHILS NFR BLD AUTO: 74 % (ref 39–78)
NRBC BLD AUTO-RTO: 0 /100 WBC (ref 0–0.2)
PLATELET # BLD AUTO: 472 10*3/MM3 (ref 130–400)
PMV BLD AUTO: 10.1 FL (ref 6–12)
POTASSIUM BLD-SCNC: 3.2 MMOL/L (ref 3.5–5.3)
POTASSIUM BLD-SCNC: 3.6 MMOL/L (ref 3.5–5.3)
PREALB SERPL-MCNC: 20.1 MG/DL (ref 18–36)
PROT SERPL-MCNC: 5.9 G/DL (ref 6.3–8.7)
RBC # BLD AUTO: 2.9 10*6/MM3 (ref 4.2–5.4)
SODIUM BLD-SCNC: 140 MMOL/L (ref 135–145)
SODIUM BLD-SCNC: 141 MMOL/L (ref 135–145)
WBC NRBC COR # BLD: 12.02 10*3/MM3 (ref 4.8–10.8)

## 2019-06-17 PROCEDURE — 25010000002 IRON SUCROSE PER 1 MG: Performed by: NURSE PRACTITIONER

## 2019-06-17 PROCEDURE — 97535 SELF CARE MNGMENT TRAINING: CPT

## 2019-06-17 PROCEDURE — 25010000002 VANCOMYCIN: Performed by: INTERNAL MEDICINE

## 2019-06-17 PROCEDURE — 97110 THERAPEUTIC EXERCISES: CPT

## 2019-06-17 PROCEDURE — 82962 GLUCOSE BLOOD TEST: CPT

## 2019-06-17 PROCEDURE — 85651 RBC SED RATE NONAUTOMATED: CPT | Performed by: INTERNAL MEDICINE

## 2019-06-17 PROCEDURE — 80053 COMPREHEN METABOLIC PANEL: CPT | Performed by: INTERNAL MEDICINE

## 2019-06-17 PROCEDURE — 25010000002 MEROPENEM: Performed by: INTERNAL MEDICINE

## 2019-06-17 PROCEDURE — 63710000001 INSULIN LISPRO (HUMAN) PER 5 UNITS: Performed by: INTERNAL MEDICINE

## 2019-06-17 PROCEDURE — 82330 ASSAY OF CALCIUM: CPT

## 2019-06-17 PROCEDURE — 25010000002 FUROSEMIDE PER 20 MG: Performed by: INTERNAL MEDICINE

## 2019-06-17 PROCEDURE — 80048 BASIC METABOLIC PNL TOTAL CA: CPT | Performed by: INTERNAL MEDICINE

## 2019-06-17 PROCEDURE — 84134 ASSAY OF PREALBUMIN: CPT | Performed by: INTERNAL MEDICINE

## 2019-06-17 PROCEDURE — 85025 COMPLETE CBC W/AUTO DIFF WBC: CPT | Performed by: INTERNAL MEDICINE

## 2019-06-17 PROCEDURE — 97116 GAIT TRAINING THERAPY: CPT

## 2019-06-17 PROCEDURE — 86140 C-REACTIVE PROTEIN: CPT | Performed by: INTERNAL MEDICINE

## 2019-06-17 RX ORDER — LORAZEPAM 2 MG/ML
0.5 INJECTION INTRAMUSCULAR EVERY 8 HOURS PRN
Status: DISCONTINUED | OUTPATIENT
Start: 2019-06-17 | End: 2019-06-19

## 2019-06-17 RX ORDER — LORAZEPAM 2 MG/ML
0.5 INJECTION INTRAMUSCULAR EVERY 8 HOURS PRN
Status: DISCONTINUED | OUTPATIENT
Start: 2019-06-17 | End: 2019-06-17 | Stop reason: SDUPTHER

## 2019-06-17 NOTE — PROGRESS NOTES
"Pharmacy Dosing Service  Pharmacokinetics  Vancomycin Follow-up Evaluation    Assessment/Action/Plan:  Trough drawn ~22h post dose is slightly supratherapeutic. SCr up from yesterday. Decreased dose from vancomycin 1500 mg q24h to vancomycin 1250 mg q24h (2 doses remain). Pharmacy will continue to monitor renal function and adjust dose accordingly.     Subjective:  Suzy Larkin is a 87 y.o. female currently on Vancomycin 1500 mg IV every 24 hours for the treatment of PNA (Current vancomycin end date: 6/19/19).    Objective:  Ht: 162.6 cm (64\"); Wt: 64.5 kg (142 lb 4.8 oz)  Estimated Creatinine Clearance: 49.2 mL/min (by C-G formula based on SCr of 0.82 mg/dL).   Lab Results   Component Value Date    CREATININE 0.82 06/17/2019    CREATININE 0.68 06/16/2019    CREATININE 0.72 06/15/2019      Lab Results   Component Value Date    WBC 12.02 (H) 06/17/2019    WBC 11.15 (H) 06/13/2019         Lab Results   Component Value Date    VANCOTROUGH 20.07 (H) 06/16/2019       Culture Results:  Microbiology Results (last 10 days)       ** No results found for the last 240 hours. **            Donna Gan, PharmD   06/17/19 8:39 AM    "

## 2019-06-18 ENCOUNTER — TELEPHONE (OUTPATIENT)
Dept: SURGERY | Age: 84
End: 2019-06-18

## 2019-06-18 LAB
GLUCOSE BLDC GLUCOMTR-MCNC: 132 MG/DL (ref 70–130)
GLUCOSE BLDC GLUCOMTR-MCNC: 139 MG/DL (ref 70–130)
GLUCOSE BLDC GLUCOMTR-MCNC: 148 MG/DL (ref 70–130)
GLUCOSE BLDC GLUCOMTR-MCNC: 185 MG/DL (ref 70–130)
MAGNESIUM SERPL-MCNC: 2.1 MG/DL (ref 1.4–2.2)
PHOSPHATE SERPL-MCNC: 3.7 MG/DL (ref 2.5–4.5)

## 2019-06-18 PROCEDURE — 25010000002 MORPHINE PER 10 MG: Performed by: INTERNAL MEDICINE

## 2019-06-18 PROCEDURE — 83735 ASSAY OF MAGNESIUM: CPT | Performed by: INTERNAL MEDICINE

## 2019-06-18 PROCEDURE — 87186 SC STD MICRODIL/AGAR DIL: CPT | Performed by: INTERNAL MEDICINE

## 2019-06-18 PROCEDURE — 25010000002 VANCOMYCIN: Performed by: INTERNAL MEDICINE

## 2019-06-18 PROCEDURE — 92526 ORAL FUNCTION THERAPY: CPT | Performed by: SPEECH-LANGUAGE PATHOLOGIST

## 2019-06-18 PROCEDURE — 25010000002 FUROSEMIDE PER 20 MG: Performed by: INTERNAL MEDICINE

## 2019-06-18 PROCEDURE — 97116 GAIT TRAINING THERAPY: CPT

## 2019-06-18 PROCEDURE — 97530 THERAPEUTIC ACTIVITIES: CPT

## 2019-06-18 PROCEDURE — 25010000002 HYDROMORPHONE 1 MG/ML SOLUTION: Performed by: INTERNAL MEDICINE

## 2019-06-18 PROCEDURE — 25010000002 MEROPENEM: Performed by: INTERNAL MEDICINE

## 2019-06-18 PROCEDURE — 84100 ASSAY OF PHOSPHORUS: CPT | Performed by: INTERNAL MEDICINE

## 2019-06-18 PROCEDURE — 87070 CULTURE OTHR SPECIMN AEROBIC: CPT | Performed by: INTERNAL MEDICINE

## 2019-06-18 PROCEDURE — 87205 SMEAR GRAM STAIN: CPT | Performed by: INTERNAL MEDICINE

## 2019-06-18 PROCEDURE — 82962 GLUCOSE BLOOD TEST: CPT

## 2019-06-18 PROCEDURE — 25010000002 HYDROMORPHONE 1 MG/ML SOLUTION: Performed by: NURSE PRACTITIONER

## 2019-06-18 RX ORDER — CEPHALEXIN 500 MG/1
500 CAPSULE ORAL 3 TIMES DAILY
Qty: 30 CAPSULE | Refills: 0 | Status: ON HOLD | OUTPATIENT
Start: 2019-06-18 | End: 2019-06-20 | Stop reason: HOSPADM

## 2019-06-18 RX ORDER — HYDROMORPHONE HYDROCHLORIDE 1 MG/ML
0.5 INJECTION, SOLUTION INTRAMUSCULAR; INTRAVENOUS; SUBCUTANEOUS
Status: DISCONTINUED | OUTPATIENT
Start: 2019-06-18 | End: 2019-06-19 | Stop reason: HOSPADM

## 2019-06-18 NOTE — TELEPHONE ENCOUNTER
Marlomyrna Panda @ [facility] continue care [phone] states pt's wound has purulent drainage     Cc: Kamar McgowanstANA chi

## 2019-06-18 NOTE — PROGRESS NOTES
[facility]  THA Quiles M.D.  HANK Colin APRN      Internal Medicine Progress Note    6/18/2019   10:08 AM    Name:  Suzy Larkin  MRN:    [id number]     Acct:     [id number]   Room:  [address] [place] [address] Day: 0     Admit Date: 6/12/2019  7:31 PM  PCP: Provider, No Known    Subjective:     C/C: nutrition replacement and oxygen weaning    Interval History: Status: stayed the same. Resting in bed. No family at bedside. Slowly progressing with therapy. Still with mouth discomfort/odynophagia. Tolerating TPN. Refuses diet trials with speech due to mouth pain.  Will advance diet as tolerated.     Review of Systems   Constitution: Positive for weakness. Negative for chills, decreased appetite and fever.   HENT: Positive for odynophagia. Negative for congestion, ear pain, nosebleeds and sore throat.    Eyes: Negative for blurred vision and discharge.   Cardiovascular: Negative for chest pain and palpitations.   Respiratory: Negative for cough, shortness of breath and wheezing.    Endocrine: Negative for cold intolerance.   Hematologic/Lymphatic: Negative for adenopathy.   Skin: Negative for itching and rash.   Musculoskeletal: Positive for back pain and muscle weakness. Negative for arthritis.   Gastrointestinal: Positive for abdominal pain and bowel incontinence. Negative for constipation, diarrhea, nausea and vomiting.   Genitourinary: Negative for dysuria and urgency.   Neurological: Negative for dizziness and numbness.   Psychiatric/Behavioral: Negative for altered mental status and depression. The patient has insomnia.          Medications:     Allergies: Allergies no known allergies    Current Meds:   Current Facility-Administered Medications:   •  acetaminophen (TYLENOL) tablet 325 mg, 325 mg, Oral, Q6H, Shorty Whalen MD  •  Adult Standard Central TPN, , Intravenous, Q24H (TPN) **AND** fat emulsion (INTRALIPID,LIPOSYN) 20 % infusion 50 g,  250 mL, Intravenous, Once per day on Mon Thu, Shorty Whalen MD  •  benzonatate (TESSALON) capsule 100 mg, 100 mg, Oral, TID PRN, Shorty Whalen MD  •  busPIRone (BUSPAR) tablet 10 mg, 10 mg, Oral, Q12H, Shorty Whalen MD  •  dextrose (D50W) 25 g/ 50mL Intravenous Solution 25 g, 25 g, Intravenous, Q15 Min PRN, Shorty Whalen MD  •  dextrose (GLUTOSE) oral gel 15 g, 15 g, Oral, Q15 Min PRN, Shorty Whalen MD  •  furosemide (LASIX) injection 20 mg, 20 mg, Intravenous, BID, Shorty Whalen MD  •  glucagon (human recombinant) (GLUCAGEN DIAGNOSTIC) injection 1 mg, 1 mg, Subcutaneous, PRN, Shorty Whalen MD  •  guaiFENesin (MUCINEX) 12 hr tablet 600 mg, 600 mg, Oral, Q12H, Octavia Fraser, APRN  •  HYDROcodone-acetaminophen (NORCO) 5-325 MG per tablet 1 tablet, 1 tablet, Oral, Q4H PRN, Shorty Whalen MD  •  HYDROcodone-acetaminophen (NORCO) 5-325 MG per tablet 2 tablet, 2 tablet, Oral, Q4H PRN, Shorty Whalen MD  •  HYDROmorphone (DILAUDID) injection 0.25 mg, 0.25 mg, Intravenous, Q3H PRN, Shorty Whalen MD  •  insulin lispro (humaLOG) injection 2-7 Units, 2-7 Units, Subcutaneous, Q6H, Shorty Whalen MD  •  ipratropium-albuterol (DUO-NEB) nebulizer solution 3 mL, 3 mL, Nebulization, Q4H While Awake - RT, Shorty Whalen MD  •  iron sucrose (VENOFER) 400 mg in sodium chloride 0.9 % 100 mL IVPB, 400 mg, Intravenous, Once **FOLLOWED BY** iron sucrose (VENOFER) 300 mg in sodium chloride 0.9 % 100 mL IVPB, 300 mg, Intravenous, Q24H, Octavia Fraser, APRN  •  lidocaine-Maalox-diphenhydramine (MIRACLE MOUTHWASH) oral suspension 5 mL, 5 mL, Swish & Swallow, 4x Daily PRN, Shorty Whalen MD  •  LORazepam (ATIVAN) injection 0.5 mg, 0.5 mg, Intravenous, Q8H PRN, Shorty Whalen MD  •  losartan (COZAAR) tablet 25 mg, 25 mg, Oral, Q24H, Shorty Whalen MD  •  magnesium hydroxide (MILK OF MAGNESIA) suspension 2400 mg/10mL 10 mL,  "10 mL, Oral, Daily PRN, Octavia Fraser APRN  •  meropenem (MERREM) 1 g/100 mL 0.9% NS VTB (mbp), 1 g, Intravenous, Q8H, Shorty Whalen MD  •  metoprolol tartrate (LOPRESSOR) tablet 25 mg, 25 mg, Oral, Q12H, Shorty Whalen MD  •  morphine injection 0.5 mg, 0.5 mg, Intravenous, Q30 Min PRN, Shorty Whalen MD  •  nystatin (MYCOSTATIN) 357174 UNIT/ML suspension 500,000 Units, 5 mL, Swish & Swallow, 4x Daily, Shorty Whalen MD  •  ondansetron (ZOFRAN) injection 4 mg, 4 mg, Intravenous, Q6H PRN, Shorty Whalen MD  •  pantoprazole (PROTONIX) injection 40 mg, 40 mg, Intravenous, Q12H, Shorty Whalen MD  •  potassium chloride (MICRO-K) CR capsule 20 mEq, 20 mEq, Oral, Daily, Shorty Whalen MD  •  sodium chloride 0.9 % flush 10 mL, 10 mL, Intracatheter, Q8H, Shorty Whalen MD  •  sodium chloride 0.9 % flush 10 mL, 10 mL, Intracatheter, PRN, Shorty Whalen MD  •  vancomycin 1250 mg/250 mL 0.9% NS IVPB ([facility]), 1,250 mg, Intravenous, Q24H, Shorty Whalen MD  •  vitamin D (ERGOCALCIFEROL) capsule 50,000 Units, 50,000 Units, Oral, Q7 Days, Shorty Whalen MD    Data:     Code Status:    There are no questions and answers to display.       No family history on file.    Social History     Socioeconomic History   • Marital status: Single     Spouse name: Not on file   • Number of children: Not on file   • Years of education: Not on file   • Highest education level: Not on file       Vitals:  Ht 162.6 cm (64\")   Wt 64.5 kg (142 lb 4.8 oz)   BMI 24.43 kg/m²     T 98.1 P 85 R 18 /58  Sp02 97% (2 lpm)      I/O (24Hr):  No intake or output data in the 24 hours ending 06/18/19 1008    Labs and imaging:      Lab Results (last 24 hours)     Procedure Component Value Units Date/Time    POC Glucose Once [[id number]]  (Abnormal) Collected:  06/18/19 0517    Specimen:  Blood Updated:  06/18/19 0545     Glucose 132 mg/dL      Comment: : SHOSHANA " Evin KathyMeter ID: [id number]       Phosphorus [[id number]]  (Normal) Collected:  06/18/19 0444    Specimen:  Blood Updated:  06/18/19 0532     Phosphorus 3.7 mg/dL     Magnesium [[id number]]  (Normal) Collected:  06/18/19 0444    Specimen:  Blood Updated:  06/18/19 0529     Magnesium 2.1 mg/dL     POC Glucose Once [[id number]]  (Abnormal) Collected:  06/17/19 2358    Specimen:  Blood Updated:  06/18/19 0016     Glucose 185 mg/dL      Comment: : SHOSHANA Cleayr KathyMeter ID: [id number]       POC Glucose Once [[id number]]  (Abnormal) Collected:  06/17/19 1710    Specimen:  Blood Updated:  06/17/19 1721     Glucose 151 mg/dL      Comment: : MICKY Gomez ID: [id number]       POC Glucose Once [[id number]]  (Abnormal) Collected:  06/17/19 0516    Specimen:  Blood Updated:  06/17/19 1554     Glucose 164 mg/dL      Comment: : dorismelissaaissatou Evin KathyMeter ID: [id number]       POC Glucose Once [[id number]]  (Abnormal) Collected:  06/16/19 2345    Specimen:  Blood Updated:  06/17/19 1554     Glucose 155 mg/dL      Comment: : shoshana Cleary KathyMeter ID: [id number]       POC Glucose Once [[id number]]  (Abnormal) Collected:  06/15/19 0518    Specimen:  Blood Updated:  06/17/19 1554     Glucose 200 mg/dL      Comment: : ashok SimaMeter ID: [id number]       POC Glucose Once [[id number]]  (Abnormal) Collected:  06/15/19 0148    Specimen:  Blood Updated:  06/17/19 1554     Glucose 149 mg/dL      Comment: : ashok Varela TaraMeter ID: [id number]       Prealbumin [[id number]]  (Normal) Collected:  06/17/19 1212    Specimen:  Blood Updated:  06/17/19 1246     Prealbumin 20.1 mg/dL     Comprehensive Metabolic Panel [[id number]]  (Abnormal) Collected:  06/17/19 1212    Specimen:  Blood Updated:  06/17/19 1241     Glucose 142 mg/dL      BUN 27 mg/dL      Creatinine 0.85 mg/dL      Sodium 140 mmol/L      Potassium 3.2 mmol/L      Chloride 105 mmol/L      CO2  29.0 mmol/L      Calcium 8.7 mg/dL      Total Protein 5.9 g/dL      Albumin 2.90 g/dL      ALT (SGPT) 37 U/L      AST (SGOT) 52 U/L      Alkaline Phosphatase 108 U/L      Total Bilirubin 0.3 mg/dL      eGFR Non African Amer 63 mL/min/1.73      Globulin 3.0 gm/dL      A/G Ratio 1.0 g/dL      BUN/Creatinine Ratio 31.8     Anion Gap 6.0 mmol/L     Narrative:       GFR Normal >60  Chronic Kidney Disease <60  Kidney Failure <15    Calcium, Ionized [[id number]] Collected:  06/17/19 1217    Specimen:  Blood Updated:  06/17/19 1234     Ionized Calcium 4.85 mg/dL      Collected by [id number]     Comment: Meter: [id number]     :  [id number]       POC Glucose Once [[id number]]  (Abnormal) Collected:  06/17/19 1122    Specimen:  Blood Updated:  06/17/19 1133     Glucose 141 mg/dL      Comment: : MICKY Gomez ID: [id number]               Xr Chest 1 View    Result Date: 6/12/2019  Narrative: EXAMINATION: XR CHEST 1 VW-. 6/12/2019 8:35 PM CDT  CHEST, ONE VIEW:  HISTORY: Central venous catheter placement  COMPARISON: None  A single frontal chest radiograph was obtained.  FINDINGS:  Right IJ deep line well-positioned in superior vena cava region without pneumothorax.  There is patchy bilateral perihilar and lower lobe infiltrates with left lower lobe consolidation.  Acute infectious/inflammatory change, pneumonia considered as well as pulmonary edema.                                  Impression: 1. Right IJ deep line well-positioned without pneumothorax or postprocedural complications. 2. Bilateral parenchymal infiltrates.  This report was finalized on 06/12/2019 20:36 by Dr. Hever Williamson MD.        Physical Examination:        Physical Exam   Constitutional: She is oriented to person, place, and time. She appears well-developed. No distress.   HENT:   Head: Normocephalic and atraumatic.   Mouth/Throat: Mucous membranes are dry. Oropharyngeal exudate present.   Lips dry, cracked, bleeding   Eyes:  Conjunctivae and EOM are normal. Pupils are equal, round, and reactive to light.   Neck: Normal range of motion. Neck supple.   Cardiovascular: Normal rate, regular rhythm, normal heart sounds and intact distal pulses.   Pulmonary/Chest: Effort normal and breath sounds normal. No respiratory distress. She has no wheezes.   Abdominal: Soft. Bowel sounds are normal. There is no tenderness.   Abdominal Incision with scant serous drainage   Musculoskeletal:   General weakness   Neurological: She is alert and oriented to person, place, and time.   Skin: Skin is warm and dry.   Psychiatric: She has a normal mood and affect. Her behavior is normal.   Nursing note and vitals reviewed.        Assessment:          Plan:        1. S/p Duodenal Ulcer perforation repair  2. Chronic Kidney Disease  3. HTN  4. Moderate Protein calorie malnutrition  5. Iron deficiency anemia  6. Sciatica   7. Anxiety  8. Stomatitis    Continue current treatment.  Monitor counts. Increase activity.  Aggressive therapies  Continue antibiotics and gentle diuresis. Maintain patient safety. Continue TPN and lipids/nutritional support. Labs Thursday. Miracle mouthwash and frequent oral care. Change pain control, increased Dilaudid to 0.5mg every 3 hours. Change Mucinex to Robitussin for ease of administration.       Electronically signed by HANK Vicente on 6/18/2019 at 10:08 AM

## 2019-06-18 NOTE — PROGRESS NOTES
Adult Nutrition  Assessment/PES    Patient Name:  Suzy Larkin  YOB: 1931  MRN: [id number]  Admit Date:  6/12/2019    Assessment Date:  6/18/2019    Comments:  Pt remains on TPN/Lipids at this time. Pertinent labs reviewed. Continues with mouth sores/discomfort, refuses most meal trays. SLP has signed off as pt refuses diet trials secondary to mouth pain however reports pt can advance diet as tolerated. Miracle Mouthwash is on MAR. Will continue to follow and look to wean TPN when PO diet can be established.     Reason for Assessment     Row Name 06/18/19 1144          Reason for Assessment    Reason For Assessment  follow-up protocol         Nutrition/Diet History     Row Name 06/18/19 1144          Nutrition/Diet History    Typical Food/Fluid Intake  pt remains on TPN with Lipids at this time. PO diet is Pureed; Thin. pt has mouth sores and mostly refuses her meal trays. miracle mouthwash is on pts MAR for x4 daily. per SLP, they plan to sign off at this time and to allow pt to advance diet as tolerated/reconsult if needed.      Factors Affecting Nutritional Intake  sore mouth         Anthropometrics     Row Name 06/18/19 1149          Body Mass Index (BMI)    BMI Assessment  BMI 18.5-24.9: normal         Labs/Tests/Procedures/Meds     Row Name 06/18/19 1150          Labs/Procedures/Meds    Lab Results Reviewed  reviewed, pertinent        Diagnostic Tests/Procedures    Diagnostic Test/Procedure Reviewed  reviewed, pertinent     Diagnostic Test/Procedures Comments  SLP to sign off at this time        Medications    Pertinent Medications Reviewed  reviewed         Physical Findings     Row Name 06/18/19 1150          Physical Findings    Overall Physical Appearance  on oxygen therapy     Oral/Mouth Cavity  mucositis     Skin  other (see comments);pressure injury;edema abd incision; coccyx PI; 2+ pitting edema, BLE; neto score 19           Nutrition Prescription Ordered     Row Name  06/18/19 1153          Nutrition Prescription PO    Current PO Diet  Pureed     Fluid Consistency  Thin        Nutrition Prescription PN    PN Route  Central     PN Goal Rate (mL/hr)  75 mL/hr     PN Current Rate (mL/hr)  75 mL/hr     PN Goal Volume (mL)  1800 mL     Dextrose Concentration (%)  20 %     Dextrose (Kcal)  1224     Amino Acid Concentration (%)  5 %     Amino Acid (gm)  90     Lipid Concentration (%)  20%     Lipid Volume (mL)  250 mL     Lipid Frequency  Other (comment) 2x weekly         Evaluation of Received Nutrient/Fluid Intake     Row Name 06/18/19 1154          Nutrient/Fluid Evaluation    Number of Days Evaluated  2 days     Additional Documentation  Fluid Intake Evaluation (Group)        Fluid Intake Evaluation    Oral Fluid (mL)  700     IV Fluid (mL)  2580        PO Evaluation    Number of Days PO Intake Evaluated  3 days     % PO Intake  <10%        PN Evaluation    Number of Days PN Evaluated  Insufficient Data unable to differentiate TPN/Lipids/IV fluids     PN Average delivery (mL/day)   --               Problem/Interventions:  Problem 1     Row Name 06/18/19 1157          Nutrition Diagnoses Problem 1    Problem 1  Needs Alternate Route     Etiology (related to)  Medical Diagnosis;Factors Affecting Nutrition     Gastrointestinal  Duodenal ulcer perforated duodenal ulcer s/p repair     Oral  Mouth Pain mouth sores present     Signs/Symptoms (evidenced by)  PO Intake;Other (comment) PN to aid with meeting nutritional needs     Percent (%) intake recorded  -- <10%; pt refuses most meal trays                 Intervention Goal     Row Name 06/18/19 1150          Intervention Goal    General  Nutrition support treatment;Improved nutrition related lab(s);Reduce/improve symptoms;Meet nutritional needs for age/condition     PO  Increase intake;Meet estimated needs;Advance diet     TF/PN  Maintain TF/PN     Transition  PN to PO     Weight  Maintain weight         Nutrition Intervention     Row  Name 06/18/19 1158          Nutrition Intervention    RD/Tech Action  Follow Tx progress;Care plan reviewd           Education/Evaluation     Row Name 06/18/19 1158          Education    Education  No discharge needs identified at this time        Monitor/Evaluation    Monitor  Per protocol;I&O;Pertinent labs;PN delivery/tolerance;Weight;Skin status;Symptoms           Electronically signed by:  Mary Viera  06/18/19 11:59 AM

## 2019-06-19 ENCOUNTER — HOSPITAL ENCOUNTER (OUTPATIENT)
Age: 84
Setting detail: OBSERVATION
Discharge: NURSING, REHAB OR LONG-TERM CARE | End: 2019-06-20
Attending: SURGERY | Admitting: SURGERY
Payer: COMMERCIAL

## 2019-06-19 ENCOUNTER — APPOINTMENT (OUTPATIENT)
Dept: CT IMAGING | Age: 84
End: 2019-06-19
Attending: SURGERY
Payer: COMMERCIAL

## 2019-06-19 LAB
ALBUMIN SERPL-MCNC: 2.7 G/DL (ref 3.5–5.2)
ALP BLD-CCNC: 112 U/L (ref 35–104)
ALT SERPL-CCNC: 17 U/L (ref 5–33)
ANION GAP SERPL CALCULATED.3IONS-SCNC: 9 MMOL/L (ref 7–19)
AST SERPL-CCNC: 14 U/L (ref 5–32)
BILIRUB SERPL-MCNC: 0.3 MG/DL (ref 0.2–1.2)
BUN BLDV-MCNC: 27 MG/DL (ref 8–23)
CALCIUM SERPL-MCNC: 8.8 MG/DL (ref 8.8–10.2)
CHLORIDE BLD-SCNC: 107 MMOL/L (ref 98–111)
CO2: 28 MMOL/L (ref 22–29)
CREAT SERPL-MCNC: 0.8 MG/DL (ref 0.5–0.9)
GFR NON-AFRICAN AMERICAN: >60
GLUCOSE BLD-MCNC: 91 MG/DL (ref 74–109)
GLUCOSE BLDC GLUCOMTR-MCNC: 132 MG/DL (ref 70–130)
GLUCOSE BLDC GLUCOMTR-MCNC: 140 MG/DL (ref 70–130)
GLUCOSE BLDC GLUCOMTR-MCNC: 158 MG/DL (ref 70–130)
INR BLD: 1.27 (ref 0.88–1.18)
POTASSIUM REFLEX MAGNESIUM: 3.7 MMOL/L (ref 3.5–5)
PROTHROMBIN TIME: 15.3 SEC (ref 12–14.6)
SODIUM BLD-SCNC: 144 MMOL/L (ref 136–145)
TOTAL PROTEIN: 5.8 G/DL (ref 6.6–8.7)

## 2019-06-19 PROCEDURE — 74177 CT ABD & PELVIS W/CONTRAST: CPT

## 2019-06-19 PROCEDURE — 25010000002 LORAZEPAM PER 2 MG: Performed by: NURSE PRACTITIONER

## 2019-06-19 PROCEDURE — 80053 COMPREHEN METABOLIC PANEL: CPT

## 2019-06-19 PROCEDURE — 99024 POSTOP FOLLOW-UP VISIT: CPT | Performed by: SURGERY

## 2019-06-19 PROCEDURE — G0379 DIRECT REFER HOSPITAL OBSERV: HCPCS

## 2019-06-19 PROCEDURE — G0378 HOSPITAL OBSERVATION PER HR: HCPCS

## 2019-06-19 PROCEDURE — C1751 CATH, INF, PER/CENT/MIDLINE: HCPCS

## 2019-06-19 PROCEDURE — 25010000002 FUROSEMIDE PER 20 MG: Performed by: INTERNAL MEDICINE

## 2019-06-19 PROCEDURE — 85610 PROTHROMBIN TIME: CPT

## 2019-06-19 PROCEDURE — 25010000002 VANCOMYCIN: Performed by: INTERNAL MEDICINE

## 2019-06-19 PROCEDURE — 2580000003 HC RX 258: Performed by: PHYSICIAN ASSISTANT

## 2019-06-19 PROCEDURE — 25010000002 MEROPENEM: Performed by: INTERNAL MEDICINE

## 2019-06-19 PROCEDURE — 6360000004 HC RX CONTRAST MEDICATION: Performed by: SURGERY

## 2019-06-19 PROCEDURE — 82962 GLUCOSE BLOOD TEST: CPT

## 2019-06-19 PROCEDURE — 25010000002 HYDROMORPHONE 1 MG/ML SOLUTION: Performed by: NURSE PRACTITIONER

## 2019-06-19 RX ORDER — ERGOCALCIFEROL 1.25 MG/1
50000 CAPSULE ORAL WEEKLY
Status: DISCONTINUED | OUTPATIENT
Start: 2019-06-20 | End: 2019-06-20 | Stop reason: HOSPADM

## 2019-06-19 RX ORDER — ACETAMINOPHEN 325 MG/1
325 TABLET ORAL EVERY 6 HOURS PRN
Status: DISCONTINUED | OUTPATIENT
Start: 2019-06-19 | End: 2019-06-20 | Stop reason: HOSPADM

## 2019-06-19 RX ORDER — LANSOPRAZOLE
30 KIT EVERY MORNING
Status: DISCONTINUED | OUTPATIENT
Start: 2019-06-20 | End: 2019-06-19 | Stop reason: HOSPADM

## 2019-06-19 RX ORDER — BENZONATATE 100 MG/1
100 CAPSULE ORAL 3 TIMES DAILY PRN
Status: DISCONTINUED | OUTPATIENT
Start: 2019-06-19 | End: 2019-06-20 | Stop reason: HOSPADM

## 2019-06-19 RX ORDER — DEXTROSE MONOHYDRATE 25 G/50ML
25 INJECTION, SOLUTION INTRAVENOUS PRN
Status: DISCONTINUED | OUTPATIENT
Start: 2019-06-19 | End: 2019-06-20 | Stop reason: HOSPADM

## 2019-06-19 RX ORDER — SODIUM CHLORIDE 0.9 % (FLUSH) 0.9 %
10 SYRINGE (ML) INJECTION PRN
Status: DISCONTINUED | OUTPATIENT
Start: 2019-06-19 | End: 2019-06-20 | Stop reason: HOSPADM

## 2019-06-19 RX ORDER — NICOTINE POLACRILEX 4 MG
15 LOZENGE BUCCAL
Status: DISCONTINUED | OUTPATIENT
Start: 2019-06-19 | End: 2019-06-20 | Stop reason: HOSPADM

## 2019-06-19 RX ORDER — POTASSIUM CHLORIDE 20 MEQ/1
20 TABLET, EXTENDED RELEASE ORAL DAILY
Status: DISCONTINUED | OUTPATIENT
Start: 2019-06-20 | End: 2019-06-20 | Stop reason: HOSPADM

## 2019-06-19 RX ORDER — PANTOPRAZOLE SODIUM 40 MG/10ML
40 INJECTION, POWDER, LYOPHILIZED, FOR SOLUTION INTRAVENOUS EVERY 12 HOURS
Status: DISCONTINUED | OUTPATIENT
Start: 2019-06-20 | End: 2019-06-20 | Stop reason: HOSPADM

## 2019-06-19 RX ORDER — LIDOCAINE HYDROCHLORIDE 10 MG/ML
1 INJECTION, SOLUTION EPIDURAL; INFILTRATION; INTRACAUDAL; PERINEURAL ONCE
Status: COMPLETED | OUTPATIENT
Start: 2019-06-19 | End: 2019-06-19

## 2019-06-19 RX ORDER — BUSPIRONE HYDROCHLORIDE 10 MG/1
10 TABLET ORAL EVERY 12 HOURS
Status: DISCONTINUED | OUTPATIENT
Start: 2019-06-20 | End: 2019-06-20 | Stop reason: HOSPADM

## 2019-06-19 RX ORDER — HYDROCODONE BITARTRATE AND ACETAMINOPHEN 5; 325 MG/1; MG/1
2 TABLET ORAL EVERY 6 HOURS PRN
Status: DISCONTINUED | OUTPATIENT
Start: 2019-06-19 | End: 2019-06-20 | Stop reason: HOSPADM

## 2019-06-19 RX ORDER — LORAZEPAM 2 MG/ML
0.5 INJECTION INTRAMUSCULAR EVERY 4 HOURS PRN
Status: DISCONTINUED | OUTPATIENT
Start: 2019-06-19 | End: 2019-06-19 | Stop reason: HOSPADM

## 2019-06-19 RX ORDER — 0.9 % SODIUM CHLORIDE 0.9 %
10 VIAL (ML) INJECTION DAILY
Status: DISCONTINUED | OUTPATIENT
Start: 2019-06-20 | End: 2019-06-20 | Stop reason: HOSPADM

## 2019-06-19 RX ORDER — HYDROCODONE BITARTRATE AND ACETAMINOPHEN 5; 325 MG/1; MG/1
1 TABLET ORAL EVERY 6 HOURS PRN
Status: DISCONTINUED | OUTPATIENT
Start: 2019-06-19 | End: 2019-06-20 | Stop reason: HOSPADM

## 2019-06-19 RX ORDER — SODIUM CHLORIDE 0.9 % (FLUSH) 0.9 %
10 SYRINGE (ML) INJECTION EVERY 12 HOURS SCHEDULED
Status: DISCONTINUED | OUTPATIENT
Start: 2019-06-19 | End: 2019-06-20 | Stop reason: HOSPADM

## 2019-06-19 RX ORDER — ONDANSETRON 2 MG/ML
4 INJECTION INTRAMUSCULAR; INTRAVENOUS EVERY 6 HOURS PRN
Status: DISCONTINUED | OUTPATIENT
Start: 2019-06-19 | End: 2019-06-20 | Stop reason: HOSPADM

## 2019-06-19 RX ORDER — LORAZEPAM 2 MG/ML
0.5 INJECTION INTRAMUSCULAR EVERY 8 HOURS PRN
Status: DISCONTINUED | OUTPATIENT
Start: 2019-06-19 | End: 2019-06-20 | Stop reason: HOSPADM

## 2019-06-19 RX ORDER — IPRATROPIUM BROMIDE AND ALBUTEROL SULFATE 2.5; .5 MG/3ML; MG/3ML
1 SOLUTION RESPIRATORY (INHALATION)
Status: DISCONTINUED | OUTPATIENT
Start: 2019-06-20 | End: 2019-06-20 | Stop reason: HOSPADM

## 2019-06-19 RX ORDER — LOSARTAN POTASSIUM 25 MG/1
25 TABLET ORAL DAILY
Status: DISCONTINUED | OUTPATIENT
Start: 2019-06-20 | End: 2019-06-20 | Stop reason: HOSPADM

## 2019-06-19 RX ORDER — FUROSEMIDE 10 MG/ML
20 INJECTION INTRAMUSCULAR; INTRAVENOUS 2 TIMES DAILY
Status: DISCONTINUED | OUTPATIENT
Start: 2019-06-20 | End: 2019-06-20 | Stop reason: HOSPADM

## 2019-06-19 RX ADMIN — Medication 10 ML: at 21:46

## 2019-06-19 RX ADMIN — LIDOCAINE HYDROCHLORIDE 1 ML: 10 INJECTION, SOLUTION EPIDURAL; INFILTRATION; INTRACAUDAL; PERINEURAL at 10:52

## 2019-06-19 RX ADMIN — IOPAMIDOL 95 ML: 755 INJECTION, SOLUTION INTRAVENOUS at 20:44

## 2019-06-19 NOTE — PROGRESS NOTES
[facility]  THA Quiles M.D.  HANK Colin APRN      Internal Medicine Progress Note    6/19/2019   9:13 AM    Name:  Suzy Larkin  MRN:    [id number]     Acct:     [id number]   Room:  [address] [place] [address] Day: 0     Admit Date: 6/12/2019  7:31 PM  PCP: Provider, No Known    Subjective:     C/C: nutrition replacement and oxygen weaning    Interval History: Status: stayed the same. Resting in bed. No family at bedside. Slowly progressing with therapy. Still with mouth discomfort/odynophagia. Tolerating TPN. Wound dehiscence to abdomen, cultured yesterday per Dr Clifton, no growth in 24 hours. Noncompliant with abdominal binder.     Review of Systems   Constitution: Positive for weakness. Negative for chills, decreased appetite and fever.   HENT: Positive for odynophagia. Negative for congestion, ear pain, nosebleeds and sore throat.    Eyes: Negative for blurred vision and discharge.   Cardiovascular: Negative for chest pain and palpitations.   Respiratory: Positive for shortness of breath. Negative for cough and wheezing.    Endocrine: Negative for cold intolerance.   Hematologic/Lymphatic: Negative for adenopathy.   Skin: Negative for itching and rash.   Musculoskeletal: Positive for back pain and muscle weakness. Negative for arthritis.   Gastrointestinal: Positive for abdominal pain and bowel incontinence. Negative for constipation, diarrhea, nausea and vomiting.   Genitourinary: Negative for dysuria and urgency.   Neurological: Negative for dizziness and numbness.   Psychiatric/Behavioral: Negative for altered mental status and depression. The patient has insomnia.          Medications:     Allergies: Allergies no known allergies    Current Meds:   Current Facility-Administered Medications:   •  acetaminophen (TYLENOL) tablet 325 mg, 325 mg, Oral, Q6H, Shorty Whalen MD  •  Adult Standard Central TPN, , Intravenous, Q24H (TPN) **AND** fat  emulsion (INTRALIPID,LIPOSYN) 20 % infusion 50 g, 250 mL, Intravenous, Once per day on Mon Thu, Shorty Whalen MD  •  benzonatate (TESSALON) capsule 100 mg, 100 mg, Oral, TID PRN, Shorty Whalen MD  •  busPIRone (BUSPAR) tablet 10 mg, 10 mg, Oral, Q12H, Shorty Whalen MD  •  dextrose (D50W) 25 g/ 50mL Intravenous Solution 25 g, 25 g, Intravenous, Q15 Min PRN, Shorty Whalen MD  •  dextrose (GLUTOSE) oral gel 15 g, 15 g, Oral, Q15 Min PRN, Shorty Whalen MD  •  furosemide (LASIX) injection 20 mg, 20 mg, Intravenous, BID, Shorty Whalen MD  •  glucagon (human recombinant) (GLUCAGEN DIAGNOSTIC) injection 1 mg, 1 mg, Subcutaneous, PRN, Shorty Whalen MD  •  guaiFENesin (ROBITUSSIN) 100 MG/5ML oral solution 200 mg, 200 mg, Oral, Q6H, Maribel Donaldson APRN  •  HYDROcodone-acetaminophen (NORCO) 5-325 MG per tablet 1 tablet, 1 tablet, Oral, Q4H PRN, Shorty Whalen MD  •  HYDROcodone-acetaminophen (NORCO) 5-325 MG per tablet 2 tablet, 2 tablet, Oral, Q4H PRN, Shorty Whalen MD  •  HYDROmorphone (DILAUDID) injection 0.5 mg, 0.5 mg, Intravenous, Q3H PRN, Maribel Donaldson APRN  •  insulin lispro (humaLOG) injection 2-7 Units, 2-7 Units, Subcutaneous, Q6H, Shorty Whalen MD  •  ipratropium-albuterol (DUO-NEB) nebulizer solution 3 mL, 3 mL, Nebulization, Q4H While Awake - RT, Shorty Whalen MD  •  iron sucrose (VENOFER) 400 mg in sodium chloride 0.9 % 100 mL IVPB, 400 mg, Intravenous, Once **FOLLOWED BY** [] iron sucrose (VENOFER) 300 mg in sodium chloride 0.9 % 100 mL IVPB, 300 mg, Intravenous, Q24H, Octavia Fraser, APRN  •  lidocaine-Maalox-diphenhydramine (MIRACLE MOUTHWASH) oral suspension 5 mL, 5 mL, Swish & Swallow, 4x Daily PRN, Shorty Whalen MD  •  LORazepam (ATIVAN) injection 0.5 mg, 0.5 mg, Intravenous, Q8H PRN, Shorty Whalen MD  •  losartan (COZAAR) tablet 25 mg, 25 mg, Oral, Q24H, Shorty Whalen,  "MD  •  magnesium hydroxide (MILK OF MAGNESIA) suspension 2400 mg/10mL 10 mL, 10 mL, Oral, Daily PRN, Octavia Fraser, APRN  •  meropenem (MERREM) 1 g/100 mL 0.9% NS VTB (mbp), 1 g, Intravenous, Q8H, Shorty Whalen MD  •  metoprolol tartrate (LOPRESSOR) tablet 25 mg, 25 mg, Oral, Q12H, Shorty Whalen MD  •  nystatin (MYCOSTATIN) 951860 UNIT/ML suspension 500,000 Units, 5 mL, Swish & Swallow, 4x Daily, Shorty Whalen MD  •  ondansetron (ZOFRAN) injection 4 mg, 4 mg, Intravenous, Q6H PRN, Shorty Whalen MD  •  pantoprazole (PROTONIX) injection 40 mg, 40 mg, Intravenous, Q12H, Shorty Whalen MD  •  potassium chloride (MICRO-K) CR capsule 20 mEq, 20 mEq, Oral, Daily, Shorty Whalen MD  •  sodium chloride 0.9 % flush 10 mL, 10 mL, Intracatheter, Q8H, Shorty Whalen MD  •  sodium chloride 0.9 % flush 10 mL, 10 mL, Intracatheter, PRN, Shorty Whalen MD  •  vancomycin 1250 mg/250 mL 0.9% NS IVPB ([facility]), 1,250 mg, Intravenous, Q24H, Shorty Whalen MD  •  vitamin D (ERGOCALCIFEROL) capsule 50,000 Units, 50,000 Units, Oral, Q7 Days, Shorty Whalen MD    Data:     Code Status:    There are no questions and answers to display.       No family history on file.    Social History     Socioeconomic History   • Marital status: Single     Spouse name: Not on file   • Number of children: Not on file   • Years of education: Not on file   • Highest education level: Not on file       Vitals:  Ht 162.6 cm (64\")   Wt 64.5 kg (142 lb 4.8 oz)   BMI 24.43 kg/m²     T 98.4 P 96 R 18 /54  Sp02 96% (2 lpm)      I/O (24Hr):  No intake or output data in the 24 hours ending 06/19/19 0913    Labs and imaging:      Lab Results (last 24 hours)     Procedure Component Value Units Date/Time    POC Glucose Once [[id number]]  (Abnormal) Collected:  06/19/19 0521    Specimen:  Blood Updated:  06/19/19 0533     Glucose 158 mg/dL      Comment: : SHOSHANA Cleary " IvyhyMeter ID: [id number]       POC Glucose Once [[id number]]  (Abnormal) Collected:  06/19/19 0002    Specimen:  Blood Updated:  06/19/19 0047     Glucose 140 mg/dL      Comment: : SHOSHANA HansenMeter ID: [id number]       POC Glucose Once [[id number]]  (Abnormal) Collected:  06/18/19 1714    Specimen:  Blood Updated:  06/18/19 1725     Glucose 148 mg/dL      Comment: : MICKY Gomez ID: [id number]       Wound Culture - Surgical Site, Abdominal Wall [[id number]] Collected:  06/18/19 1127    Specimen:  Surgical Site from Abdominal Wall Updated:  06/18/19 1433     Gram Stain Rare (1+) WBCs seen      No organisms seen    POC Glucose Once [[id number]]  (Abnormal) Collected:  06/18/19 1147    Specimen:  Blood Updated:  06/18/19 1201     Glucose 139 mg/dL      Comment: : MICKY Gomez ID: [id number]               Xr Chest 1 View    Result Date: 6/12/2019  Narrative: EXAMINATION: XR CHEST 1 VW-. 6/12/2019 8:35 PM CDT  CHEST, ONE VIEW:  HISTORY: Central venous catheter placement  COMPARISON: None  A single frontal chest radiograph was obtained.  FINDINGS:  Right IJ deep line well-positioned in superior vena cava region without pneumothorax.  There is patchy bilateral perihilar and lower lobe infiltrates with left lower lobe consolidation.  Acute infectious/inflammatory change, pneumonia considered as well as pulmonary edema.                                  Impression: 1. Right IJ deep line well-positioned without pneumothorax or postprocedural complications. 2. Bilateral parenchymal infiltrates.  This report was finalized on 06/12/2019 20:36 by Dr. Hever Williamson MD.        Physical Examination:        Physical Exam   Constitutional: She is oriented to person, place, and time. She appears well-developed. No distress.   Appears uncomfortable    HENT:   Head: Normocephalic and atraumatic.   Mouth/Throat: Mucous membranes are dry. Oropharyngeal exudate present.   Dry  blood to corners of lips   Eyes: Conjunctivae and EOM are normal. Pupils are equal, round, and reactive to light.   Neck: Normal range of motion. Neck supple.   Cardiovascular: Normal rate, regular rhythm, normal heart sounds and intact distal pulses.   Pulmonary/Chest: Effort normal and breath sounds normal. No respiratory distress. She has no wheezes.   Does not appear in any distress, heavy nasal breathing     Abdominal: Soft. Bowel sounds are normal. There is no tenderness.   Abdominal Incision with scant serous drainage   Musculoskeletal:   General weakness   Neurological: She is alert and oriented to person, place, and time.   Skin: Skin is warm and dry.   Psychiatric: She has a normal mood and affect. Her behavior is normal.   Nursing note and vitals reviewed.        Assessment:          Plan:        1. S/p Duodenal Ulcer perforation repair  2. Chronic Kidney Disease  3. HTN  4. Moderate Protein calorie malnutrition  5. Iron deficiency anemia  6. Sciatica   7. Anxiety  8. Stomatitis    Continue current treatment.  Monitor counts. Increase activity.  Aggressive therapies  Continue antibiotics and gentle diuresis. Maintain patient safety. Continue TPN and lipids/nutritional support. Labs in am. Continue Miracle mouthwash and frequent oral care. Increase Ativan for anxiety.       Electronically signed by HANK Vicente on 6/19/2019 at 9:13 AM     I have discussed the care of Suzy Larkin, including pertinent history and exam findings, with the nurse practitioner.    I have seen and examined the patient and the key elements of all parts of the encounter have been performed by me.  I agree with the assessment, plan and orders as documented by HANK Vicente, after I modified the exam findings and the plan of treatments and the final version is my approved version of the assessment.        Electronically signed by Shorty Whaeln MD on 6/19/2019 at 12:00 PM

## 2019-06-19 NOTE — TELEPHONE ENCOUNTER
Talked with Dr. Jessi Taylor. He is sending the pt. to [facility] due to possible dehiscence. Dr. Panchal Patches notified.    Melisa Cespedes

## 2019-06-19 NOTE — CONSULTS
PICC placed by Brianne Banks RN. Inserted into left basilic vein with ultrasound guidance.  Tip verified by Cardiac Electrical Activity

## 2019-06-20 ENCOUNTER — HOSPITAL ENCOUNTER (OUTPATIENT)
Facility: HOSPITAL | Age: 84
Discharge: HOME HEALTH | End: 2019-07-08
Attending: INTERNAL MEDICINE | Admitting: INTERNAL MEDICINE

## 2019-06-20 VITALS
OXYGEN SATURATION: 97 % | HEART RATE: 86 BPM | SYSTOLIC BLOOD PRESSURE: 122 MMHG | DIASTOLIC BLOOD PRESSURE: 60 MMHG | TEMPERATURE: 97.8 F | RESPIRATION RATE: 18 BRPM

## 2019-06-20 DIAGNOSIS — R26.9 GAIT ABNORMALITY: ICD-10-CM

## 2019-06-20 PROBLEM — L08.9 WOUND INFECTION: Status: ACTIVE | Noted: 2019-06-20

## 2019-06-20 PROBLEM — L02.211 ABSCESS OF SKIN OF ABDOMEN: Status: ACTIVE | Noted: 2019-06-20

## 2019-06-20 PROBLEM — T14.8XXA WOUND INFECTION: Status: ACTIVE | Noted: 2019-06-20

## 2019-06-20 LAB
GLUCOSE BLD-MCNC: 130 MG/DL (ref 70–99)
GLUCOSE BLD-MCNC: 131 MG/DL (ref 70–99)
GLUCOSE BLD-MCNC: 135 MG/DL (ref 70–99)
GLUCOSE BLDC GLUCOMTR-MCNC: 139 MG/DL (ref 70–130)
PERFORMED ON: ABNORMAL

## 2019-06-20 PROCEDURE — G0378 HOSPITAL OBSERVATION PER HR: HCPCS

## 2019-06-20 PROCEDURE — 82948 REAGENT STRIP/BLOOD GLUCOSE: CPT

## 2019-06-20 PROCEDURE — 2500000003 HC RX 250 WO HCPCS: Performed by: SURGERY

## 2019-06-20 PROCEDURE — 96376 TX/PRO/DX INJ SAME DRUG ADON: CPT

## 2019-06-20 PROCEDURE — 6360000002 HC RX W HCPCS: Performed by: SURGERY

## 2019-06-20 PROCEDURE — 6370000000 HC RX 637 (ALT 250 FOR IP): Performed by: SURGERY

## 2019-06-20 PROCEDURE — C9113 INJ PANTOPRAZOLE SODIUM, VIA: HCPCS | Performed by: SURGERY

## 2019-06-20 PROCEDURE — 96368 THER/DIAG CONCURRENT INF: CPT

## 2019-06-20 PROCEDURE — 2580000003 HC RX 258: Performed by: PHYSICIAN ASSISTANT

## 2019-06-20 PROCEDURE — 2580000003 HC RX 258: Performed by: SURGERY

## 2019-06-20 PROCEDURE — 94640 AIRWAY INHALATION TREATMENT: CPT

## 2019-06-20 PROCEDURE — 87075 CULTR BACTERIA EXCEPT BLOOD: CPT

## 2019-06-20 PROCEDURE — 96365 THER/PROPH/DIAG IV INF INIT: CPT

## 2019-06-20 PROCEDURE — 87106 FUNGI IDENTIFICATION YEAST: CPT

## 2019-06-20 PROCEDURE — 96375 TX/PRO/DX INJ NEW DRUG ADDON: CPT

## 2019-06-20 PROCEDURE — 87205 SMEAR GRAM STAIN: CPT

## 2019-06-20 PROCEDURE — 82962 GLUCOSE BLOOD TEST: CPT

## 2019-06-20 PROCEDURE — 87070 CULTURE OTHR SPECIMN AEROBIC: CPT

## 2019-06-20 PROCEDURE — 96366 THER/PROPH/DIAG IV INF ADDON: CPT

## 2019-06-20 PROCEDURE — 25010000002 VANCOMYCIN: Performed by: INTERNAL MEDICINE

## 2019-06-20 PROCEDURE — 25010000002 MEROPENEM: Performed by: INTERNAL MEDICINE

## 2019-06-20 PROCEDURE — 2700000000 HC OXYGEN THERAPY PER DAY

## 2019-06-20 PROCEDURE — 87186 SC STD MICRODIL/AGAR DIL: CPT

## 2019-06-20 PROCEDURE — 6360000002 HC RX W HCPCS: Performed by: PHYSICIAN ASSISTANT

## 2019-06-20 RX ORDER — LOSARTAN POTASSIUM 25 MG/1
25 TABLET ORAL
Status: DISCONTINUED | OUTPATIENT
Start: 2019-06-21 | End: 2019-06-20

## 2019-06-20 RX ORDER — DEXTROSE MONOHYDRATE 25 G/50ML
25 INJECTION, SOLUTION INTRAVENOUS
Status: DISCONTINUED | OUTPATIENT
Start: 2019-06-20 | End: 2019-07-08 | Stop reason: HOSPADM

## 2019-06-20 RX ORDER — HYDROCODONE BITARTRATE AND ACETAMINOPHEN 5; 325 MG/1; MG/1
2 TABLET ORAL EVERY 4 HOURS PRN
Status: DISPENSED | OUTPATIENT
Start: 2019-06-20 | End: 2019-06-30

## 2019-06-20 RX ORDER — ERGOCALCIFEROL 1.25 MG/1
50000 CAPSULE ORAL
Status: DISCONTINUED | OUTPATIENT
Start: 2019-06-21 | End: 2019-07-08 | Stop reason: HOSPADM

## 2019-06-20 RX ORDER — HYDROCODONE BITARTRATE AND ACETAMINOPHEN 5; 325 MG/1; MG/1
1 TABLET ORAL EVERY 4 HOURS PRN
Status: DISPENSED | OUTPATIENT
Start: 2019-06-20 | End: 2019-06-30

## 2019-06-20 RX ORDER — BENZONATATE 100 MG/1
100 CAPSULE ORAL 3 TIMES DAILY PRN
Status: DISCONTINUED | OUTPATIENT
Start: 2019-06-20 | End: 2019-07-08 | Stop reason: HOSPADM

## 2019-06-20 RX ORDER — SODIUM CHLORIDE 0.9 % (FLUSH) 0.9 %
10 SYRINGE (ML) INJECTION AS NEEDED
Status: DISCONTINUED | OUTPATIENT
Start: 2019-06-20 | End: 2019-07-08 | Stop reason: HOSPADM

## 2019-06-20 RX ORDER — IPRATROPIUM BROMIDE AND ALBUTEROL SULFATE 2.5; .5 MG/3ML; MG/3ML
3 SOLUTION RESPIRATORY (INHALATION)
Status: DISCONTINUED | OUTPATIENT
Start: 2019-06-20 | End: 2019-06-29 | Stop reason: SDUPTHER

## 2019-06-20 RX ORDER — HYDROMORPHONE HYDROCHLORIDE 1 MG/ML
0.5 INJECTION, SOLUTION INTRAMUSCULAR; INTRAVENOUS; SUBCUTANEOUS
Status: DISCONTINUED | OUTPATIENT
Start: 2019-06-20 | End: 2019-06-25 | Stop reason: SDUPTHER

## 2019-06-20 RX ORDER — BUSPIRONE HYDROCHLORIDE 10 MG/1
10 TABLET ORAL EVERY 12 HOURS SCHEDULED
Status: DISCONTINUED | OUTPATIENT
Start: 2019-06-20 | End: 2019-07-08 | Stop reason: HOSPADM

## 2019-06-20 RX ORDER — LANSOPRAZOLE
30 KIT EVERY MORNING
Status: DISCONTINUED | OUTPATIENT
Start: 2019-06-21 | End: 2019-07-08 | Stop reason: HOSPADM

## 2019-06-20 RX ORDER — ACETAMINOPHEN 325 MG/1
325 TABLET ORAL EVERY 6 HOURS SCHEDULED
Status: DISCONTINUED | OUTPATIENT
Start: 2019-06-20 | End: 2019-07-08 | Stop reason: HOSPADM

## 2019-06-20 RX ORDER — POTASSIUM CHLORIDE 750 MG/1
20 CAPSULE, EXTENDED RELEASE ORAL DAILY
Status: DISCONTINUED | OUTPATIENT
Start: 2019-06-21 | End: 2019-07-08 | Stop reason: HOSPADM

## 2019-06-20 RX ORDER — LORAZEPAM 2 MG/ML
0.5 INJECTION INTRAMUSCULAR EVERY 4 HOURS PRN
Status: DISCONTINUED | OUTPATIENT
Start: 2019-06-20 | End: 2019-07-08 | Stop reason: HOSPADM

## 2019-06-20 RX ORDER — NICOTINE POLACRILEX 4 MG
15 LOZENGE BUCCAL
Status: DISCONTINUED | OUTPATIENT
Start: 2019-06-20 | End: 2019-07-08 | Stop reason: HOSPADM

## 2019-06-20 RX ORDER — SODIUM CHLORIDE 0.9 % (FLUSH) 0.9 %
10 SYRINGE (ML) INJECTION EVERY 8 HOURS
Status: DISCONTINUED | OUTPATIENT
Start: 2019-06-20 | End: 2019-07-08 | Stop reason: HOSPADM

## 2019-06-20 RX ORDER — ONDANSETRON 2 MG/ML
4 INJECTION INTRAMUSCULAR; INTRAVENOUS EVERY 6 HOURS PRN
Status: DISCONTINUED | OUTPATIENT
Start: 2019-06-20 | End: 2019-07-08 | Stop reason: HOSPADM

## 2019-06-20 RX ORDER — LOSARTAN POTASSIUM 50 MG/1
50 TABLET ORAL
Status: DISCONTINUED | OUTPATIENT
Start: 2019-06-21 | End: 2019-07-08 | Stop reason: HOSPADM

## 2019-06-20 RX ORDER — FUROSEMIDE 10 MG/ML
20 INJECTION INTRAMUSCULAR; INTRAVENOUS
Status: DISCONTINUED | OUTPATIENT
Start: 2019-06-20 | End: 2019-07-03 | Stop reason: SDUPTHER

## 2019-06-20 RX ADMIN — HYDROCODONE BITARTRATE AND ACETAMINOPHEN 2 TABLET: 5; 325 TABLET ORAL at 12:43

## 2019-06-20 RX ADMIN — FUROSEMIDE 20 MG: 10 INJECTION, SOLUTION INTRAMUSCULAR; INTRAVENOUS at 10:09

## 2019-06-20 RX ADMIN — NYSTATIN 500000 UNITS: 100000 SUSPENSION ORAL at 16:59

## 2019-06-20 RX ADMIN — BUSPIRONE HYDROCHLORIDE 10 MG: 10 TABLET ORAL at 00:28

## 2019-06-20 RX ADMIN — ENOXAPARIN SODIUM 40 MG: 40 INJECTION SUBCUTANEOUS at 17:02

## 2019-06-20 RX ADMIN — POTASSIUM CHLORIDE 20 MEQ: 20 TABLET, EXTENDED RELEASE ORAL at 10:09

## 2019-06-20 RX ADMIN — IPRATROPIUM BROMIDE AND ALBUTEROL SULFATE 1 AMPULE: .5; 3 SOLUTION RESPIRATORY (INHALATION) at 15:03

## 2019-06-20 RX ADMIN — I.V. FAT EMULSION 250 ML: 20 EMULSION INTRAVENOUS at 16:59

## 2019-06-20 RX ADMIN — MEROPENEM 1 G: 1 INJECTION, POWDER, FOR SOLUTION INTRAVENOUS at 17:02

## 2019-06-20 RX ADMIN — VANCOMYCIN HYDROCHLORIDE 1000 MG: 10 INJECTION, POWDER, LYOPHILIZED, FOR SOLUTION INTRAVENOUS at 01:05

## 2019-06-20 RX ADMIN — LOSARTAN POTASSIUM 25 MG: 25 TABLET ORAL at 10:09

## 2019-06-20 RX ADMIN — HYDROCODONE BITARTRATE AND ACETAMINOPHEN 2 TABLET: 5; 325 TABLET ORAL at 00:28

## 2019-06-20 RX ADMIN — PANTOPRAZOLE SODIUM 40 MG: 40 INJECTION, POWDER, FOR SOLUTION INTRAVENOUS at 12:25

## 2019-06-20 RX ADMIN — MEROPENEM 1 G: 1 INJECTION, POWDER, FOR SOLUTION INTRAVENOUS at 10:08

## 2019-06-20 RX ADMIN — PANTOPRAZOLE SODIUM 40 MG: 40 INJECTION, POWDER, FOR SOLUTION INTRAVENOUS at 00:28

## 2019-06-20 RX ADMIN — FUROSEMIDE 20 MG: 10 INJECTION, SOLUTION INTRAMUSCULAR; INTRAVENOUS at 16:58

## 2019-06-20 RX ADMIN — ASCORBIC ACID, VITAMIN A PALMITATE, CHOLECALCIFEROL, THIAMINE HYDROCHLORIDE, RIBOFLAVIN-5 PHOSPHATE SODIUM, PYRIDOXINE HYDROCHLORIDE, NIACINAMIDE, DEXPANTHENOL, ALPHA-TOCOPHEROL ACETATE, VITAMIN K1, FOLIC ACID, BIOTIN, CYANOCOBALAMIN: 200; 3300; 200; 6; 3.6; 6; 40; 15; 10; 150; 600; 60; 5 INJECTION, SOLUTION INTRAVENOUS at 00:30

## 2019-06-20 RX ADMIN — ERGOCALCIFEROL 50000 UNITS: 1.25 CAPSULE ORAL at 13:58

## 2019-06-20 RX ADMIN — ENOXAPARIN SODIUM 40 MG: 40 INJECTION SUBCUTANEOUS at 16:59

## 2019-06-20 RX ADMIN — NYSTATIN 500000 UNITS: 100000 SUSPENSION ORAL at 10:09

## 2019-06-20 RX ADMIN — METOPROLOL TARTRATE 25 MG: 25 TABLET ORAL at 12:25

## 2019-06-20 RX ADMIN — Medication 10 ML: at 10:10

## 2019-06-20 RX ADMIN — MEROPENEM 1 G: 1 INJECTION, POWDER, FOR SOLUTION INTRAVENOUS at 00:29

## 2019-06-20 RX ADMIN — BUSPIRONE HYDROCHLORIDE 10 MG: 10 TABLET ORAL at 12:25

## 2019-06-20 RX ADMIN — IPRATROPIUM BROMIDE AND ALBUTEROL SULFATE 1 AMPULE: .5; 3 SOLUTION RESPIRATORY (INHALATION) at 10:47

## 2019-06-20 RX ADMIN — IPRATROPIUM BROMIDE AND ALBUTEROL SULFATE 1 AMPULE: .5; 3 SOLUTION RESPIRATORY (INHALATION) at 07:01

## 2019-06-20 RX ADMIN — NYSTATIN 500000 UNITS: 100000 SUSPENSION ORAL at 12:25

## 2019-06-20 ASSESSMENT — ENCOUNTER SYMPTOMS
CONSTIPATION: 1
ABDOMINAL PAIN: 1
ALLERGIC/IMMUNOLOGIC NEGATIVE: 1
WHEEZING: 1
SINUS PRESSURE: 0
EYES NEGATIVE: 1
APNEA: 0
NAUSEA: 1
BACK PAIN: 1
SHORTNESS OF BREATH: 1

## 2019-06-20 ASSESSMENT — PAIN SCALES - GENERAL
PAINLEVEL_OUTOF10: 9
PAINLEVEL_OUTOF10: 0
PAINLEVEL_OUTOF10: 8

## 2019-06-20 NOTE — PROGRESS NOTES
I reviewed the CT scan and do not see a fascial defect. I will review it with radiology in the morning. She does have a subcutaneous abscess which we drained at the bedside with blunt dissection. There was approximately 50 cc of pus finger evaluation does not appear to demonstrate a fascial defect. After reviewing the CT scan in the morning I think she can probably return to the LTAC with wet-to-dry dressings.

## 2019-06-20 NOTE — H&P
Henri Rae is an 80 y.o.  female that was d/c'ed to [facility] last week after an emergent open repair of a perforated gastric ulcer. She was noted to have poor nutrition that warranted placement of a PEG tube. She was maintaining on tube feeds with no oral intake and was referred to [facility] in stable condition. We were contacted by Dr. Kwabena Paz yesterday that the patient was experiencing excessive output from her midline incisional site he was concerned that she may have dehiscence of her abdominal wound. He was advised to send the patient back for evaluation and treatment. Now she is admitted for routine care. Allergies: Patient has no known allergies.       Current Facility-Administered Medications   Medication Dose Route Frequency Provider Last Rate Last Dose    vancomycin (VANCOCIN) intermittent dosing (placeholder)   Other RX Placeholder Pedro Meadows MD        sodium chloride flush 0.9 % injection 10 mL  10 mL Intravenous 2 times per day Debbie Bobby PA-C   10 mL at 06/20/19 1010    sodium chloride flush 0.9 % injection 10 mL  10 mL Intravenous PRN Debbie Bobby PA-C        magnesium hydroxide (MILK OF MAGNESIA) 400 MG/5ML suspension 30 mL  30 mL Oral Daily PRN Debbie Bobby PA-C        ondansetron [facility]) injection 4 mg  4 mg Intravenous Q6H PRN Debbie Bobby PA-C        enoxaparin (LOVENOX) injection 40 mg  40 mg Subcutaneous Daily Debbie Bobby PA-C        acetaminophen (TYLENOL) tablet 325 mg  325 mg Oral Q6H PRN Pedro Meadows MD        PN-Adult Premix 5/15 - Standardard Electrolytes - Central Line   Intravenous Continuous TPN Pedro Meadows MD 75 mL/hr at 06/20/19 1240      fat emulsion 20 % infusion 250 mL  250 mL Intravenous Once per day on Mon Thu Pedro Meadows MD        busPIRone (BUSPAR) tablet 10 mg  10 mg Oral Q12H Pedro Meadows MD   10 mg at 06/20/19 1225    furosemide (LASIX) injection 20 mg  20 mg Intravenous BID Pedro Meadows MD   20 mg at 06/20/19 1009    guaiFENesin (ROBITUSSIN) 100 MG/5ML liquid 200 mg  200 mg Oral Q6H PRN Janes Manuel MD        insulin lispro (HUMALOG) injection vial 0-6 Units  0-6 Units Subcutaneous TID WC Janes Manuel MD        insulin lispro (HUMALOG) injection vial 0-3 Units  0-3 Units Subcutaneous Nightly Janes Manuel MD        ipratropium-albuterol (DUONEB) nebulizer solution 1 ampule  1 ampule Inhalation Q4H WA Janes Manuel MD   1 ampule at 06/20/19 1047    losartan (COZAAR) tablet 25 mg  25 mg Oral Daily Janes Manuel MD   25 mg at 06/20/19 1009    meropenem (MERREM) 1 g in sodium chloride 0.9 % 100 mL IVPB (mini-bag)  1 g Intravenous Q8H Janes Manuel MD   Stopped at 06/20/19 1120    metoprolol tartrate (LOPRESSOR) tablet 25 mg  25 mg Oral Q12H Janes Manuel MD   25 mg at 06/20/19 1225    nystatin (MYCOSTATIN) 062843 UNIT/ML suspension 500,000 Units  5 mL Oral 4x Daily Janes Manuel MD   500,000 Units at 06/20/19 1225    pantoprazole (PROTONIX) injection 40 mg  40 mg Intravenous Q12H Janes Manuel MD   40 mg at 06/20/19 1225    And    sodium chloride (PF) 0.9 % injection 10 mL  10 mL Intravenous Daily Janes Manuel MD        potassium chloride (KLOR-CON M) extended release tablet 20 mEq  20 mEq Oral Daily Janes Manuel MD   20 mEq at 06/20/19 1009    vancomycin (VANCOCIN) 1 g in dextrose 5% 250 mL IVPB  1,000 mg Intravenous Q24H Janes Manuel MD   Stopped at 06/20/19 0330    vitamin D (ERGOCALCIFEROL) capsule 50,000 Units  50,000 Units Oral Weekly Janes Manuel MD        benzonatate (TESSALON) capsule 100 mg  100 mg Oral TID PRN Janes Manuel MD        dextrose 50 % IV solution  25 g Intravenous PRN Janes Manuel MD        HYDROcodone-acetaminophen [facility]) 5-325 MG per tablet 1 tablet  1 tablet Oral Q6H PRN Janes Manuel MD        Or    HYDROcodone-acetaminophen [facility]) 5-325 MG per tablet 2 tablet  2 tablet Oral Q6H PRN Janes Manuel MD   2 tablet at 19 1243    glucagon (rDNA) injection 1 mg  1 mg Subcutaneous PRN Ever MD Nadege        glucose (GLUTOSE) 40 % oral gel 15 g  15 g Oral Q15 Min PRN Ever MD Nadege        magic (miracle) mouthwash  5 mL Swish & Spit 4x Daily PRN Ever MD Nadege        LORazepam (ATIVAN) injection 0.5 mg  0.5 mg Intravenous Q8H PRN Ever MD Nadege        HYDROmorphone (DILAUDID) injection 0.5 mg  0.5 mg Intravenous Q3H PRN Ever MD Nadege           Past Surgical History:   Procedure Laterality Date    LAPAROTOMY EXPLORATORY N/A 2019    CENTRAL LINE PLACEMENT LAPAROTOMY EXPLORATORY GRAM PATCH REPAIR OF DUODENAL ULCER performed by Emeli Garcia MD at [address]       Past Medical History:   Diagnosis Date    Bowel perforation [facility]), s/p repair on      CKD (chronic kidney disease), baseline stage unknown     Essential hypertension        Family History   Problem Relation Age of Onset    No Known Problems Mother          of smoke inhalation in fire    Cancer Father          of cancer of prostate    Other Daughter         sciatica    No Known Problems Daughter     No Known Problems Son     No Known Problems Son         has back surgery       Social History     Tobacco Use    Smoking status: Never Smoker    Smokeless tobacco: Never Used   Substance Use Topics    Alcohol use: Not Currently             Review of Systems   Constitutional: Positive for appetite change, fatigue and unexpected weight change. Negative for chills and fever. HENT: Positive for hearing loss. Negative for sinus pressure. Eyes: Negative. Respiratory: Positive for shortness of breath and wheezing. Negative for apnea. Cardiovascular: Positive for leg swelling. Negative for chest pain and palpitations. Gastrointestinal: Positive for abdominal pain, constipation and nausea. Endocrine: Negative for polydipsia, polyphagia and polyuria.    Genitourinary: Negative for difficulty urinating, dysuria and flank pain. Musculoskeletal: Positive for arthralgias, back pain and neck pain. Skin: Negative. Allergic/Immunologic: Negative. Neurological: Negative for dizziness, seizures and headaches. Hematological: Negative for adenopathy. Does not bruise/bleed easily. Psychiatric/Behavioral: Negative for confusion and sleep disturbance. The patient is nervous/anxious. Physical Exam   Constitutional: She is oriented to person, place, and time. She appears well-developed and well-nourished. No distress. /67   Pulse 89   Temp 97.6 °F (36.4 °C) (Temporal)   Resp 18   SpO2 96%      HENT:   Head: Normocephalic and atraumatic. Mouth/Throat: Oropharynx is clear and moist. No oropharyngeal exudate. Eyes: Pupils are equal, round, and reactive to light. Conjunctivae and EOM are normal. No scleral icterus. Neck: Normal range of motion. Neck supple. No JVD present. No tracheal deviation present. No thyromegaly present. Cardiovascular: Normal rate and regular rhythm. Exam reveals no gallop and no friction rub. No murmur heard. Pulmonary/Chest: Effort normal and breath sounds normal. No stridor. No respiratory distress. She has no wheezes. She has no rales. Abdominal: Soft. Bowel sounds are normal. She exhibits no distension, no ascites and no mass. There is no hepatosplenomegaly. There is tenderness in the epigastric area. There is no rebound, no guarding, no tenderness at McBurney's point and negative Oneill's sign. No hernia. Hernia confirmed negative in the right inguinal area and confirmed negative in the left inguinal area. Musculoskeletal: Normal range of motion. She exhibits no edema or tenderness. Lymphadenopathy:     She has no cervical adenopathy. Neurological: She is alert and oriented to person, place, and time. She exhibits normal muscle tone. Skin: Skin is warm and dry. Psychiatric: She has a normal mood and affect.  Her behavior is normal. Judgment and thought content normal.       Assessment:  Postop. wound infection s/p open repair of perforated PUD,     Plan:  WIll send her for a CT to r/o an abd. dehiscence. If fascia is intact, she will need an I&D of the wound.        Electronically signed by Walter Ross PA-C on 6/20/19 at 1:10 PM         Walter Ross PA-C  6/20/2019

## 2019-06-20 NOTE — CARE COORDINATION
Pt is able DC back to LTAC on Thursday June 20th.   Continue [facility]   D   F  Electronically signed by Tamiko Smith on 6/20/2019 at 1:23 PM

## 2019-06-20 NOTE — DISCHARGE SUMMARY
Physician Discharge Summary     Patient ID:  Petty Cronin  [id number]  80 y.o.  11/3/1931    Admit date: 6/19/2019    Discharge date and time: No discharge date for patient encounter. Admitting Physician: Dahlia Langford MD     Discharge Physician: Dr. Jai Fried    Admission Diagnoses: Abdominal wall abscess [L02.211]  Abscess of skin of abdomen [L02.211]    Discharge Diagnoses: Subcutaneous wound infection s/p open repair of perforated PUD    Admission Condition: good    Discharged Condition: good    Indication for Admission: Wound infection with possible dehiscence     Hospital Course: Mrs. Ana Luisa Sauceda is a pleasant 49-year-old  female that was discharged 1 week ago after undergoing an open repair of a perforated peptic ulcer by Dr. Marleen Snellen. We were contacted by Dr. Rafa Lynn yesterday from the [facility] unit at [facility] regarding excessive drainage from the incisional site. He was concerned that she may have dehiscence of her abdominal wound and suggested her to be seen. He was advised to send her over for evaluation. She was admitted to observational status where she underwent a CAT scan of the abdomen which revealed the fascia to be intact with no evidence of dehiscence however she was not have a subcu wound infection that needed to be drained. The wound was I&D by Dr. Jai Freid in the room with copious amounts of pus extracted and culture was obtained. This morning she reports that she feels better with the wound noted to be less erythematous and the drainage noted to be less. Due to the patient remaining stable is felt that she is now stable for transfer back to [facility] unit.      Consults: none    Significant Diagnostic Studies: CT scan    Treatments: I&D of abd. wound by Dr. Jai Fried    Discharge Exam:  See Epic note    Disposition: LTAC    In process/preliminary results:  Outstanding Order Results     Date and Time Order Name Status Description    6/20/2019 0500 Wound Culture In process     6/10/2019

## 2019-06-20 NOTE — PROGRESS NOTES
Patients glasses not present on admission to floor. Called and spoke with Nurse at [facility] and glasses are at [facility] facility in patients room. Nurse notes that they are still in [address] bag with her name on the bag from her stay here earlier this month.  Electronically signed by Loulou Schuler RN on 6/19/2019 at 8:24 PM

## 2019-06-21 LAB
GLUCOSE BLDC GLUCOMTR-MCNC: 119 MG/DL (ref 70–130)
GLUCOSE BLDC GLUCOMTR-MCNC: 140 MG/DL (ref 70–130)
GLUCOSE BLDC GLUCOMTR-MCNC: 141 MG/DL (ref 70–130)
GLUCOSE BLDC GLUCOMTR-MCNC: 166 MG/DL (ref 70–130)

## 2019-06-21 PROCEDURE — 97605 NEG PRS WND THER DME<=50SQCM: CPT | Performed by: PHYSICAL THERAPIST

## 2019-06-21 PROCEDURE — 92610 EVALUATE SWALLOWING FUNCTION: CPT

## 2019-06-21 PROCEDURE — 25010000002 FLUCONAZOLE PER 200 MG: Performed by: INTERNAL MEDICINE

## 2019-06-21 PROCEDURE — 82962 GLUCOSE BLOOD TEST: CPT

## 2019-06-21 PROCEDURE — 97161 PT EVAL LOW COMPLEX 20 MIN: CPT | Performed by: PHYSICAL THERAPIST

## 2019-06-21 PROCEDURE — 25010000002 VANCOMYCIN: Performed by: INTERNAL MEDICINE

## 2019-06-21 PROCEDURE — 25010000002 MEROPENEM: Performed by: INTERNAL MEDICINE

## 2019-06-21 PROCEDURE — 25010000002 FUROSEMIDE PER 20 MG: Performed by: INTERNAL MEDICINE

## 2019-06-21 PROCEDURE — 97165 OT EVAL LOW COMPLEX 30 MIN: CPT | Performed by: OCCUPATIONAL THERAPIST

## 2019-06-21 RX ORDER — FLUCONAZOLE 2 MG/ML
400 INJECTION, SOLUTION INTRAVENOUS EVERY 24 HOURS
Status: DISPENSED | OUTPATIENT
Start: 2019-06-21 | End: 2019-06-26

## 2019-06-22 LAB
BACTERIA SPEC AEROBE CULT: ABNORMAL
GLUCOSE BLDC GLUCOMTR-MCNC: 130 MG/DL (ref 70–130)
GLUCOSE BLDC GLUCOMTR-MCNC: 137 MG/DL (ref 70–130)
GLUCOSE BLDC GLUCOMTR-MCNC: 138 MG/DL (ref 70–130)
GLUCOSE BLDC GLUCOMTR-MCNC: 142 MG/DL (ref 70–130)
GLUCOSE BLDC GLUCOMTR-MCNC: 153 MG/DL (ref 70–130)
GRAM STN SPEC: ABNORMAL
GRAM STN SPEC: ABNORMAL

## 2019-06-22 PROCEDURE — 25010000002 MEROPENEM: Performed by: INTERNAL MEDICINE

## 2019-06-22 PROCEDURE — 25010000002 FUROSEMIDE PER 20 MG: Performed by: INTERNAL MEDICINE

## 2019-06-22 PROCEDURE — 25010000002 VANCOMYCIN: Performed by: INTERNAL MEDICINE

## 2019-06-22 PROCEDURE — 25010000002 LORAZEPAM PER 2 MG: Performed by: INTERNAL MEDICINE

## 2019-06-22 PROCEDURE — 82962 GLUCOSE BLOOD TEST: CPT

## 2019-06-22 PROCEDURE — 97110 THERAPEUTIC EXERCISES: CPT

## 2019-06-22 PROCEDURE — 97116 GAIT TRAINING THERAPY: CPT

## 2019-06-22 PROCEDURE — 25010000002 FLUCONAZOLE PER 200 MG: Performed by: INTERNAL MEDICINE

## 2019-06-22 PROCEDURE — 63710000001 INSULIN LISPRO (HUMAN) PER 5 UNITS: Performed by: INTERNAL MEDICINE

## 2019-06-23 LAB
CREAT BLD-MCNC: 0.87 MG/DL (ref 0.5–1.4)
GFR SERPL CREATININE-BSD FRML MDRD: 62 ML/MIN/1.73
GLUCOSE BLDC GLUCOMTR-MCNC: 138 MG/DL (ref 70–130)
GLUCOSE BLDC GLUCOMTR-MCNC: 145 MG/DL (ref 70–130)
GLUCOSE BLDC GLUCOMTR-MCNC: 195 MG/DL (ref 70–130)
GLUCOSE BLDC GLUCOMTR-MCNC: 219 MG/DL (ref 70–130)
VANCOMYCIN TROUGH SERPL-MCNC: 19.79 MCG/ML (ref 10–20)

## 2019-06-23 PROCEDURE — 63710000001 INSULIN LISPRO (HUMAN) PER 5 UNITS: Performed by: INTERNAL MEDICINE

## 2019-06-23 PROCEDURE — 82962 GLUCOSE BLOOD TEST: CPT

## 2019-06-23 PROCEDURE — 82565 ASSAY OF CREATININE: CPT | Performed by: INTERNAL MEDICINE

## 2019-06-23 PROCEDURE — 80202 ASSAY OF VANCOMYCIN: CPT | Performed by: INTERNAL MEDICINE

## 2019-06-23 PROCEDURE — 63710000001 PREDNISONE PER 1 MG: Performed by: NURSE PRACTITIONER

## 2019-06-23 PROCEDURE — 25010000002 VANCOMYCIN PER 500 MG: Performed by: INTERNAL MEDICINE

## 2019-06-23 PROCEDURE — 25010000002 FLUCONAZOLE PER 200 MG: Performed by: INTERNAL MEDICINE

## 2019-06-23 PROCEDURE — 25010000002 HYDROMORPHONE 1 MG/ML SOLUTION: Performed by: INTERNAL MEDICINE

## 2019-06-23 PROCEDURE — 97110 THERAPEUTIC EXERCISES: CPT

## 2019-06-23 PROCEDURE — 25010000002 VANCOMYCIN: Performed by: INTERNAL MEDICINE

## 2019-06-23 PROCEDURE — 25010000002 MEROPENEM: Performed by: INTERNAL MEDICINE

## 2019-06-23 PROCEDURE — 25010000002 LORAZEPAM PER 2 MG: Performed by: INTERNAL MEDICINE

## 2019-06-23 PROCEDURE — 25010000002 FUROSEMIDE PER 20 MG: Performed by: INTERNAL MEDICINE

## 2019-06-23 RX ORDER — CETIRIZINE HYDROCHLORIDE 10 MG/1
10 TABLET ORAL NIGHTLY
Status: DISCONTINUED | OUTPATIENT
Start: 2019-06-23 | End: 2019-07-08 | Stop reason: HOSPADM

## 2019-06-23 RX ORDER — VANCOMYCIN HYDROCHLORIDE 1 G/200ML
1000 INJECTION, SOLUTION INTRAVENOUS EVERY 24 HOURS
Status: DISPENSED | OUTPATIENT
Start: 2019-06-23 | End: 2019-06-27

## 2019-06-23 RX ORDER — PREDNISONE 20 MG/1
20 TABLET ORAL 2 TIMES DAILY WITH MEALS
Status: DISPENSED | OUTPATIENT
Start: 2019-06-23 | End: 2019-06-25

## 2019-06-23 RX ORDER — PREDNISONE 20 MG/1
20 TABLET ORAL
Status: DISPENSED | OUTPATIENT
Start: 2019-06-25 | End: 2019-06-28

## 2019-06-24 LAB
ANION GAP SERPL CALCULATED.3IONS-SCNC: 7 MMOL/L (ref 4–13)
BASOPHILS # BLD AUTO: 0.05 10*3/MM3 (ref 0–0.2)
BASOPHILS NFR BLD AUTO: 0.4 % (ref 0–2)
BUN BLD-MCNC: 36 MG/DL (ref 5–21)
BUN/CREAT SERPL: 44.4 (ref 7–25)
CALCIUM SPEC-SCNC: 9 MG/DL (ref 8.4–10.4)
CHLORIDE SERPL-SCNC: 105 MMOL/L (ref 98–110)
CO2 SERPL-SCNC: 28 MMOL/L (ref 24–31)
CREAT BLD-MCNC: 0.81 MG/DL (ref 0.5–1.4)
CRP SERPL-MCNC: 0.98 MG/DL (ref 0–0.99)
DEPRECATED RDW RBC AUTO: 58.6 FL (ref 40–54)
EOSINOPHIL # BLD AUTO: 0 10*3/MM3 (ref 0–0.7)
EOSINOPHIL NFR BLD AUTO: 0 % (ref 0–4)
ERYTHROCYTE [DISTWIDTH] IN BLOOD BY AUTOMATED COUNT: 18.6 % (ref 12–15)
ERYTHROCYTE [SEDIMENTATION RATE] IN BLOOD: 24 MM/HR (ref 0–20)
GFR SERPL CREATININE-BSD FRML MDRD: 67 ML/MIN/1.73
GLUCOSE BLD-MCNC: 186 MG/DL (ref 70–100)
GLUCOSE BLDC GLUCOMTR-MCNC: 166 MG/DL (ref 70–130)
GLUCOSE BLDC GLUCOMTR-MCNC: 189 MG/DL (ref 70–130)
GLUCOSE BLDC GLUCOMTR-MCNC: 211 MG/DL (ref 70–130)
GRAM STAIN RESULT: ABNORMAL
HCT VFR BLD AUTO: 25.1 % (ref 37–47)
HGB BLD-MCNC: 7.9 G/DL (ref 12–16)
IMM GRANULOCYTES # BLD AUTO: 0.17 10*3/MM3 (ref 0–0.05)
IMM GRANULOCYTES NFR BLD AUTO: 1.3 % (ref 0–5)
LYMPHOCYTES # BLD AUTO: 1.11 10*3/MM3 (ref 0.72–4.86)
LYMPHOCYTES NFR BLD AUTO: 8.8 % (ref 15–45)
MCH RBC QN AUTO: 27.1 PG (ref 28–32)
MCHC RBC AUTO-ENTMCNC: 31.5 G/DL (ref 33–36)
MCV RBC AUTO: 86 FL (ref 82–98)
MONOCYTES # BLD AUTO: 0.32 10*3/MM3 (ref 0.19–1.3)
MONOCYTES NFR BLD AUTO: 2.5 % (ref 4–12)
NEUTROPHILS # BLD AUTO: 11.03 10*3/MM3 (ref 1.87–8.4)
NEUTROPHILS NFR BLD AUTO: 87 % (ref 39–78)
NRBC BLD AUTO-RTO: 0 /100 WBC (ref 0–0.2)
ORGANISM: ABNORMAL
ORGANISM: ABNORMAL
PLATELET # BLD AUTO: 319 10*3/MM3 (ref 130–400)
PMV BLD AUTO: 10.2 FL (ref 6–12)
POTASSIUM BLD-SCNC: 4 MMOL/L (ref 3.5–5.3)
RBC # BLD AUTO: 2.92 10*6/MM3 (ref 4.2–5.4)
SODIUM BLD-SCNC: 140 MMOL/L (ref 135–145)
WBC NRBC COR # BLD: 12.68 10*3/MM3 (ref 4.8–10.8)
WOUND/ABSCESS: ABNORMAL

## 2019-06-24 PROCEDURE — 85651 RBC SED RATE NONAUTOMATED: CPT | Performed by: INTERNAL MEDICINE

## 2019-06-24 PROCEDURE — 63710000001 PREDNISONE PER 1 MG: Performed by: NURSE PRACTITIONER

## 2019-06-24 PROCEDURE — 25010000002 MEROPENEM: Performed by: INTERNAL MEDICINE

## 2019-06-24 PROCEDURE — 82962 GLUCOSE BLOOD TEST: CPT

## 2019-06-24 PROCEDURE — 63710000001 INSULIN LISPRO (HUMAN) PER 5 UNITS: Performed by: INTERNAL MEDICINE

## 2019-06-24 PROCEDURE — 80048 BASIC METABOLIC PNL TOTAL CA: CPT | Performed by: INTERNAL MEDICINE

## 2019-06-24 PROCEDURE — 85025 COMPLETE CBC W/AUTO DIFF WBC: CPT | Performed by: INTERNAL MEDICINE

## 2019-06-24 PROCEDURE — 25010000002 FLUCONAZOLE PER 200 MG: Performed by: INTERNAL MEDICINE

## 2019-06-24 PROCEDURE — 97605 NEG PRS WND THER DME<=50SQCM: CPT

## 2019-06-24 PROCEDURE — 25010000002 FUROSEMIDE PER 20 MG: Performed by: INTERNAL MEDICINE

## 2019-06-24 PROCEDURE — 97116 GAIT TRAINING THERAPY: CPT

## 2019-06-24 PROCEDURE — 92523 SPEECH SOUND LANG COMPREHEN: CPT

## 2019-06-24 PROCEDURE — 25010000002 VANCOMYCIN PER 500 MG: Performed by: INTERNAL MEDICINE

## 2019-06-24 PROCEDURE — 86140 C-REACTIVE PROTEIN: CPT | Performed by: INTERNAL MEDICINE

## 2019-06-25 LAB
GLUCOSE BLDC GLUCOMTR-MCNC: 125 MG/DL (ref 70–130)
GLUCOSE BLDC GLUCOMTR-MCNC: 148 MG/DL (ref 70–130)
GLUCOSE BLDC GLUCOMTR-MCNC: 166 MG/DL (ref 70–130)
GLUCOSE BLDC GLUCOMTR-MCNC: 200 MG/DL (ref 70–130)

## 2019-06-25 PROCEDURE — 25010000002 FUROSEMIDE PER 20 MG: Performed by: INTERNAL MEDICINE

## 2019-06-25 PROCEDURE — 63710000001 PREDNISONE PER 1 MG: Performed by: NURSE PRACTITIONER

## 2019-06-25 PROCEDURE — 97535 SELF CARE MNGMENT TRAINING: CPT

## 2019-06-25 PROCEDURE — 92507 TX SP LANG VOICE COMM INDIV: CPT

## 2019-06-25 PROCEDURE — 63710000001 INSULIN LISPRO (HUMAN) PER 5 UNITS: Performed by: INTERNAL MEDICINE

## 2019-06-25 PROCEDURE — 25010000002 FLUCONAZOLE PER 200 MG: Performed by: INTERNAL MEDICINE

## 2019-06-25 PROCEDURE — 25010000002 LORAZEPAM PER 2 MG: Performed by: INTERNAL MEDICINE

## 2019-06-25 PROCEDURE — 97116 GAIT TRAINING THERAPY: CPT

## 2019-06-25 PROCEDURE — 25010000002 VANCOMYCIN PER 500 MG: Performed by: INTERNAL MEDICINE

## 2019-06-25 PROCEDURE — 25010000002 MEROPENEM: Performed by: INTERNAL MEDICINE

## 2019-06-25 PROCEDURE — 82962 GLUCOSE BLOOD TEST: CPT

## 2019-06-25 RX ORDER — HYDROMORPHONE HYDROCHLORIDE 1 MG/ML
0.5 INJECTION, SOLUTION INTRAMUSCULAR; INTRAVENOUS; SUBCUTANEOUS
Status: DISCONTINUED | OUTPATIENT
Start: 2019-06-25 | End: 2019-06-27 | Stop reason: ALTCHOICE

## 2019-06-26 LAB
GLUCOSE BLDC GLUCOMTR-MCNC: 120 MG/DL (ref 70–130)
GLUCOSE BLDC GLUCOMTR-MCNC: 126 MG/DL (ref 70–130)
GLUCOSE BLDC GLUCOMTR-MCNC: 129 MG/DL (ref 70–130)
GLUCOSE BLDC GLUCOMTR-MCNC: 144 MG/DL (ref 70–130)
GLUCOSE BLDC GLUCOMTR-MCNC: 187 MG/DL (ref 70–130)

## 2019-06-26 PROCEDURE — 87186 SC STD MICRODIL/AGAR DIL: CPT | Performed by: INTERNAL MEDICINE

## 2019-06-26 PROCEDURE — 97116 GAIT TRAINING THERAPY: CPT

## 2019-06-26 PROCEDURE — 97110 THERAPEUTIC EXERCISES: CPT

## 2019-06-26 PROCEDURE — 92507 TX SP LANG VOICE COMM INDIV: CPT | Performed by: SPEECH-LANGUAGE PATHOLOGIST

## 2019-06-26 PROCEDURE — 87076 CULTURE ANAEROBE IDENT EACH: CPT | Performed by: INTERNAL MEDICINE

## 2019-06-26 PROCEDURE — 25010000002 LORAZEPAM PER 2 MG: Performed by: INTERNAL MEDICINE

## 2019-06-26 PROCEDURE — 25010000002 VANCOMYCIN PER 500 MG: Performed by: INTERNAL MEDICINE

## 2019-06-26 PROCEDURE — 87106 FUNGI IDENTIFICATION YEAST: CPT | Performed by: INTERNAL MEDICINE

## 2019-06-26 PROCEDURE — 25010000002 FUROSEMIDE PER 20 MG: Performed by: INTERNAL MEDICINE

## 2019-06-26 PROCEDURE — 82962 GLUCOSE BLOOD TEST: CPT

## 2019-06-26 PROCEDURE — 87205 SMEAR GRAM STAIN: CPT | Performed by: INTERNAL MEDICINE

## 2019-06-26 PROCEDURE — 63710000001 PREDNISONE PER 1 MG: Performed by: NURSE PRACTITIONER

## 2019-06-26 PROCEDURE — 25010000002 MEROPENEM: Performed by: INTERNAL MEDICINE

## 2019-06-26 PROCEDURE — 97535 SELF CARE MNGMENT TRAINING: CPT

## 2019-06-26 PROCEDURE — 63710000001 INSULIN LISPRO (HUMAN) PER 5 UNITS: Performed by: INTERNAL MEDICINE

## 2019-06-26 PROCEDURE — 87070 CULTURE OTHR SPECIMN AEROBIC: CPT | Performed by: INTERNAL MEDICINE

## 2019-06-26 RX ORDER — MEGESTROL ACETATE 40 MG/ML
400 SUSPENSION ORAL DAILY
Status: DISCONTINUED | OUTPATIENT
Start: 2019-06-26 | End: 2019-07-02

## 2019-06-27 LAB
ANION GAP SERPL CALCULATED.3IONS-SCNC: 4 MMOL/L (ref 4–13)
BASOPHILS # BLD AUTO: 0.08 10*3/MM3 (ref 0–0.2)
BASOPHILS NFR BLD AUTO: 0.5 % (ref 0–2)
BUN BLD-MCNC: 42 MG/DL (ref 5–21)
BUN/CREAT SERPL: 45.7 (ref 7–25)
CALCIUM SPEC-SCNC: 8.8 MG/DL (ref 8.4–10.4)
CHLORIDE SERPL-SCNC: 103 MMOL/L (ref 98–110)
CHOLEST SERPL-MCNC: 117 MG/DL (ref 130–200)
CO2 SERPL-SCNC: 32 MMOL/L (ref 24–31)
CREAT BLD-MCNC: 0.92 MG/DL (ref 0.5–1.4)
CRP SERPL-MCNC: <0.5 MG/DL (ref 0–0.99)
DEPRECATED RDW RBC AUTO: 59.6 FL (ref 40–54)
EOSINOPHIL # BLD AUTO: 0.43 10*3/MM3 (ref 0–0.7)
EOSINOPHIL NFR BLD AUTO: 2.9 % (ref 0–4)
ERYTHROCYTE [DISTWIDTH] IN BLOOD BY AUTOMATED COUNT: 19.1 % (ref 12–15)
ERYTHROCYTE [SEDIMENTATION RATE] IN BLOOD: 8 MM/HR (ref 0–20)
GFR SERPL CREATININE-BSD FRML MDRD: 58 ML/MIN/1.73
GLUCOSE BLD-MCNC: 102 MG/DL (ref 70–100)
GLUCOSE BLDC GLUCOMTR-MCNC: 110 MG/DL (ref 70–130)
GLUCOSE BLDC GLUCOMTR-MCNC: 140 MG/DL (ref 70–130)
GLUCOSE BLDC GLUCOMTR-MCNC: 183 MG/DL (ref 70–130)
HCT VFR BLD AUTO: 26.1 % (ref 37–47)
HGB BLD-MCNC: 8.2 G/DL (ref 12–16)
IMM GRANULOCYTES # BLD AUTO: 0.49 10*3/MM3 (ref 0–0.05)
IMM GRANULOCYTES NFR BLD AUTO: 3.3 % (ref 0–5)
LYMPHOCYTES # BLD AUTO: 2.37 10*3/MM3 (ref 0.72–4.86)
LYMPHOCYTES NFR BLD AUTO: 15.9 % (ref 15–45)
MAGNESIUM SERPL-MCNC: 2.2 MG/DL (ref 1.4–2.2)
MCH RBC QN AUTO: 27.5 PG (ref 28–32)
MCHC RBC AUTO-ENTMCNC: 31.4 G/DL (ref 33–36)
MCV RBC AUTO: 87.6 FL (ref 82–98)
MONOCYTES # BLD AUTO: 1.47 10*3/MM3 (ref 0.19–1.3)
MONOCYTES NFR BLD AUTO: 9.9 % (ref 4–12)
NEUTROPHILS # BLD AUTO: 10.06 10*3/MM3 (ref 1.87–8.4)
NEUTROPHILS NFR BLD AUTO: 67.5 % (ref 39–78)
NRBC BLD AUTO-RTO: 0.3 /100 WBC (ref 0–0.2)
PHOSPHATE SERPL-MCNC: 3.2 MG/DL (ref 2.5–4.5)
PLATELET # BLD AUTO: 322 10*3/MM3 (ref 130–400)
PMV BLD AUTO: 10 FL (ref 6–12)
POTASSIUM BLD-SCNC: 4.1 MMOL/L (ref 3.5–5.3)
RBC # BLD AUTO: 2.98 10*6/MM3 (ref 4.2–5.4)
SODIUM BLD-SCNC: 139 MMOL/L (ref 135–145)
TRIGL SERPL-MCNC: 141 MG/DL (ref 0–149)
WBC NRBC COR # BLD: 14.9 10*3/MM3 (ref 4.8–10.8)

## 2019-06-27 PROCEDURE — 84100 ASSAY OF PHOSPHORUS: CPT | Performed by: INTERNAL MEDICINE

## 2019-06-27 PROCEDURE — 97535 SELF CARE MNGMENT TRAINING: CPT

## 2019-06-27 PROCEDURE — 92507 TX SP LANG VOICE COMM INDIV: CPT

## 2019-06-27 PROCEDURE — 63710000001 PREDNISONE PER 1 MG: Performed by: NURSE PRACTITIONER

## 2019-06-27 PROCEDURE — 63710000001 INSULIN LISPRO (HUMAN) PER 5 UNITS: Performed by: INTERNAL MEDICINE

## 2019-06-27 PROCEDURE — 97116 GAIT TRAINING THERAPY: CPT

## 2019-06-27 PROCEDURE — 82962 GLUCOSE BLOOD TEST: CPT

## 2019-06-27 PROCEDURE — 82465 ASSAY BLD/SERUM CHOLESTEROL: CPT | Performed by: INTERNAL MEDICINE

## 2019-06-27 PROCEDURE — 85025 COMPLETE CBC W/AUTO DIFF WBC: CPT | Performed by: INTERNAL MEDICINE

## 2019-06-27 PROCEDURE — 97110 THERAPEUTIC EXERCISES: CPT

## 2019-06-27 PROCEDURE — 25010000002 FUROSEMIDE PER 20 MG: Performed by: INTERNAL MEDICINE

## 2019-06-27 PROCEDURE — 86140 C-REACTIVE PROTEIN: CPT | Performed by: INTERNAL MEDICINE

## 2019-06-27 PROCEDURE — 84478 ASSAY OF TRIGLYCERIDES: CPT | Performed by: INTERNAL MEDICINE

## 2019-06-27 PROCEDURE — 25010000002 MEROPENEM: Performed by: INTERNAL MEDICINE

## 2019-06-27 PROCEDURE — 85651 RBC SED RATE NONAUTOMATED: CPT | Performed by: INTERNAL MEDICINE

## 2019-06-27 PROCEDURE — 25010000002 LORAZEPAM PER 2 MG: Performed by: INTERNAL MEDICINE

## 2019-06-27 PROCEDURE — 80048 BASIC METABOLIC PNL TOTAL CA: CPT | Performed by: INTERNAL MEDICINE

## 2019-06-27 PROCEDURE — 83735 ASSAY OF MAGNESIUM: CPT | Performed by: INTERNAL MEDICINE

## 2019-06-28 LAB
GLUCOSE BLDC GLUCOMTR-MCNC: 119 MG/DL (ref 70–130)
GLUCOSE BLDC GLUCOMTR-MCNC: 138 MG/DL (ref 70–130)
GLUCOSE BLDC GLUCOMTR-MCNC: 149 MG/DL (ref 70–130)
GLUCOSE BLDC GLUCOMTR-MCNC: 157 MG/DL (ref 70–130)

## 2019-06-28 PROCEDURE — 92507 TX SP LANG VOICE COMM INDIV: CPT

## 2019-06-28 PROCEDURE — 63710000001 INSULIN LISPRO (HUMAN) PER 5 UNITS: Performed by: INTERNAL MEDICINE

## 2019-06-28 PROCEDURE — 82962 GLUCOSE BLOOD TEST: CPT

## 2019-06-28 PROCEDURE — 97116 GAIT TRAINING THERAPY: CPT

## 2019-06-28 PROCEDURE — 25010000002 FUROSEMIDE PER 20 MG: Performed by: INTERNAL MEDICINE

## 2019-06-28 PROCEDURE — 97605 NEG PRS WND THER DME<=50SQCM: CPT

## 2019-06-29 LAB
GLUCOSE BLDC GLUCOMTR-MCNC: 132 MG/DL (ref 70–130)
GLUCOSE BLDC GLUCOMTR-MCNC: 133 MG/DL (ref 70–130)
GLUCOSE BLDC GLUCOMTR-MCNC: 135 MG/DL (ref 70–130)
GLUCOSE BLDC GLUCOMTR-MCNC: 156 MG/DL (ref 70–130)
MAGNESIUM SERPL-MCNC: 2.2 MG/DL (ref 1.4–2.2)
PHOSPHATE SERPL-MCNC: 4 MG/DL (ref 2.5–4.5)

## 2019-06-29 PROCEDURE — 84100 ASSAY OF PHOSPHORUS: CPT | Performed by: INTERNAL MEDICINE

## 2019-06-29 PROCEDURE — 97116 GAIT TRAINING THERAPY: CPT

## 2019-06-29 PROCEDURE — 83735 ASSAY OF MAGNESIUM: CPT | Performed by: INTERNAL MEDICINE

## 2019-06-29 PROCEDURE — 25010000002 FUROSEMIDE PER 20 MG: Performed by: INTERNAL MEDICINE

## 2019-06-29 PROCEDURE — 82962 GLUCOSE BLOOD TEST: CPT

## 2019-06-29 RX ORDER — IPRATROPIUM BROMIDE AND ALBUTEROL SULFATE 2.5; .5 MG/3ML; MG/3ML
3 SOLUTION RESPIRATORY (INHALATION)
Status: DISCONTINUED | OUTPATIENT
Start: 2019-06-29 | End: 2019-07-03

## 2019-06-30 LAB
GLUCOSE BLDC GLUCOMTR-MCNC: 104 MG/DL (ref 70–130)
GLUCOSE BLDC GLUCOMTR-MCNC: 133 MG/DL (ref 70–130)
GLUCOSE BLDC GLUCOMTR-MCNC: 138 MG/DL (ref 70–130)
GLUCOSE BLDC GLUCOMTR-MCNC: 144 MG/DL (ref 70–130)

## 2019-06-30 PROCEDURE — 97116 GAIT TRAINING THERAPY: CPT

## 2019-06-30 PROCEDURE — 97605 NEG PRS WND THER DME<=50SQCM: CPT

## 2019-06-30 PROCEDURE — 82962 GLUCOSE BLOOD TEST: CPT

## 2019-06-30 PROCEDURE — 25010000002 FUROSEMIDE PER 20 MG: Performed by: INTERNAL MEDICINE

## 2019-06-30 RX ORDER — ESCITALOPRAM OXALATE 10 MG/1
10 TABLET ORAL DAILY
Status: DISCONTINUED | OUTPATIENT
Start: 2019-06-30 | End: 2019-07-08 | Stop reason: HOSPADM

## 2019-07-01 LAB
ALBUMIN SERPL-MCNC: 3.2 G/DL (ref 3.5–5)
ALBUMIN/GLOB SERPL: 1.1 G/DL (ref 1.1–2.5)
ALP SERPL-CCNC: 108 U/L (ref 24–120)
ALT SERPL W P-5'-P-CCNC: 83 U/L (ref 0–54)
ANION GAP SERPL CALCULATED.3IONS-SCNC: 8 MMOL/L (ref 4–13)
AST SERPL-CCNC: 61 U/L (ref 7–45)
BASOPHILS # BLD AUTO: 0.09 10*3/MM3 (ref 0–0.2)
BASOPHILS NFR BLD AUTO: 0.6 % (ref 0–2)
BILIRUB SERPL-MCNC: 0.5 MG/DL (ref 0.1–1)
BUN BLD-MCNC: 57 MG/DL (ref 5–21)
BUN/CREAT SERPL: 47.9 (ref 7–25)
CA-I BLD-MCNC: 5.28 MG/DL (ref 4.6–5.4)
CALCIUM SPEC-SCNC: 9.4 MG/DL (ref 8.4–10.4)
CHLORIDE SERPL-SCNC: 105 MMOL/L (ref 98–110)
CO2 SERPL-SCNC: 25 MMOL/L (ref 24–31)
CREAT BLD-MCNC: 1.19 MG/DL (ref 0.5–1.4)
CRP SERPL-MCNC: 1.2 MG/DL (ref 0–0.99)
DEPRECATED RDW RBC AUTO: 60.2 FL (ref 40–54)
EOSINOPHIL # BLD AUTO: 0.82 10*3/MM3 (ref 0–0.7)
EOSINOPHIL NFR BLD AUTO: 5.5 % (ref 0–4)
ERYTHROCYTE [DISTWIDTH] IN BLOOD BY AUTOMATED COUNT: 19.4 % (ref 12–15)
ERYTHROCYTE [SEDIMENTATION RATE] IN BLOOD: 12 MM/HR (ref 0–20)
GFR SERPL CREATININE-BSD FRML MDRD: 43 ML/MIN/1.73
GLOBULIN UR ELPH-MCNC: 3 GM/DL
GLUCOSE BLD-MCNC: 129 MG/DL (ref 70–100)
GLUCOSE BLDC GLUCOMTR-MCNC: 133 MG/DL (ref 70–130)
GLUCOSE BLDC GLUCOMTR-MCNC: 149 MG/DL (ref 70–130)
GLUCOSE BLDC GLUCOMTR-MCNC: 160 MG/DL (ref 70–130)
GLUCOSE BLDC GLUCOMTR-MCNC: 176 MG/DL (ref 70–130)
HCT VFR BLD AUTO: 30.3 % (ref 37–47)
HGB BLD-MCNC: 9.8 G/DL (ref 12–16)
IMM GRANULOCYTES # BLD AUTO: 0.32 10*3/MM3 (ref 0–0.05)
IMM GRANULOCYTES NFR BLD AUTO: 2.1 % (ref 0–5)
LYMPHOCYTES # BLD AUTO: 1.77 10*3/MM3 (ref 0.72–4.86)
LYMPHOCYTES NFR BLD AUTO: 11.8 % (ref 15–45)
Lab: NORMAL
MAGNESIUM SERPL-MCNC: 2.2 MG/DL (ref 1.4–2.2)
MCH RBC QN AUTO: 27.9 PG (ref 28–32)
MCHC RBC AUTO-ENTMCNC: 32.3 G/DL (ref 33–36)
MCV RBC AUTO: 86.3 FL (ref 82–98)
MONOCYTES # BLD AUTO: 1.09 10*3/MM3 (ref 0.19–1.3)
MONOCYTES NFR BLD AUTO: 7.2 % (ref 4–12)
NEUTROPHILS # BLD AUTO: 10.95 10*3/MM3 (ref 1.87–8.4)
NEUTROPHILS NFR BLD AUTO: 72.8 % (ref 39–78)
NRBC BLD AUTO-RTO: 0 /100 WBC (ref 0–0.2)
PHOSPHATE SERPL-MCNC: 4.2 MG/DL (ref 2.5–4.5)
PLATELET # BLD AUTO: 277 10*3/MM3 (ref 130–400)
PMV BLD AUTO: 9.4 FL (ref 6–12)
POTASSIUM BLD-SCNC: 4.5 MMOL/L (ref 3.5–5.3)
PREALB SERPL-MCNC: 39.9 MG/DL (ref 18–36)
PROT SERPL-MCNC: 6.2 G/DL (ref 6.3–8.7)
RBC # BLD AUTO: 3.51 10*6/MM3 (ref 4.2–5.4)
SODIUM BLD-SCNC: 138 MMOL/L (ref 135–145)
WBC NRBC COR # BLD: 15.04 10*3/MM3 (ref 4.8–10.8)

## 2019-07-01 PROCEDURE — 85025 COMPLETE CBC W/AUTO DIFF WBC: CPT | Performed by: INTERNAL MEDICINE

## 2019-07-01 PROCEDURE — 97535 SELF CARE MNGMENT TRAINING: CPT

## 2019-07-01 PROCEDURE — 63710000001 INSULIN LISPRO (HUMAN) PER 5 UNITS: Performed by: INTERNAL MEDICINE

## 2019-07-01 PROCEDURE — 80053 COMPREHEN METABOLIC PANEL: CPT | Performed by: INTERNAL MEDICINE

## 2019-07-01 PROCEDURE — 85651 RBC SED RATE NONAUTOMATED: CPT | Performed by: INTERNAL MEDICINE

## 2019-07-01 PROCEDURE — 82962 GLUCOSE BLOOD TEST: CPT

## 2019-07-01 PROCEDURE — 83735 ASSAY OF MAGNESIUM: CPT | Performed by: INTERNAL MEDICINE

## 2019-07-01 PROCEDURE — 97110 THERAPEUTIC EXERCISES: CPT

## 2019-07-01 PROCEDURE — 82330 ASSAY OF CALCIUM: CPT

## 2019-07-01 PROCEDURE — 97605 NEG PRS WND THER DME<=50SQCM: CPT

## 2019-07-01 PROCEDURE — 86140 C-REACTIVE PROTEIN: CPT | Performed by: INTERNAL MEDICINE

## 2019-07-01 PROCEDURE — 84100 ASSAY OF PHOSPHORUS: CPT | Performed by: INTERNAL MEDICINE

## 2019-07-01 PROCEDURE — 25010000002 FUROSEMIDE PER 20 MG: Performed by: INTERNAL MEDICINE

## 2019-07-01 PROCEDURE — 84134 ASSAY OF PREALBUMIN: CPT | Performed by: INTERNAL MEDICINE

## 2019-07-01 PROCEDURE — 97116 GAIT TRAINING THERAPY: CPT

## 2019-07-01 RX ORDER — HYDROCODONE BITARTRATE AND ACETAMINOPHEN 5; 325 MG/1; MG/1
2 TABLET ORAL EVERY 4 HOURS PRN
Status: DISCONTINUED | OUTPATIENT
Start: 2019-07-01 | End: 2019-07-08 | Stop reason: HOSPADM

## 2019-07-01 RX ORDER — HYDROCODONE BITARTRATE AND ACETAMINOPHEN 5; 325 MG/1; MG/1
1 TABLET ORAL EVERY 4 HOURS PRN
Status: DISCONTINUED | OUTPATIENT
Start: 2019-07-01 | End: 2019-07-08 | Stop reason: HOSPADM

## 2019-07-01 NOTE — PROGRESS NOTES
[facility]  THA Quiles M.D.  HANK Colin APRN        Internal Medicine Progress Note   7/1/2019 0955  Name:  Suzy Larkin  MRN:    [id number]                                                    Acct:     [id number]   Room:  [address] [place] [address] Day: 0     Admit Date: 6/12/2019  7:31 PM                               PCP: Provider, No Known     Subjective:      C/C: nutrition replacement and oxygen weaning     Interval History: Status: improved. Up to chair. No family at bedside. No c/o pain. Progressing with therapy.  Improved spirits.  Renal function slight decline.  BS stable.    Review of Systems   Constitution: Positive for weakness. Negative for chills, decreased appetite and fever.   HENT: Positive for odynophagia. Negative for congestion, ear pain, nosebleeds and sore throat.    Eyes: Negative for blurred vision and discharge.   Cardiovascular: Negative for chest pain and palpitations.   Respiratory: Positive for COUGH Negative for Shortness of breath. Negative wheezing.    Endocrine: Negative for cold intolerance.   Hematologic/Lymphatic: Negative for adenopathy.   Skin: Negative for itching and rash.   Musculoskeletal: Positive for back pain and muscle weakness. Negative for arthritis.   Gastrointestinal: Positive for abdominal pain and bowel incontinence. Negative for constipation, diarrhea, nausea and vomiting.   Genitourinary: Negative for dysuria and urgency.   Neurological: Negative for dizziness and numbness.   Psychiatric/Behavioral: Negative for altered mental status and depression. The patient has insomnia.             Medications:      Allergies: Allergies no known allergies     Current Meds:   Current Facility-Administered Medications:   •  acetaminophen (TYLENOL) tablet 325 mg, 325 mg, Oral, Q6H, Shorty Whalen MD  •  Adult Standard Central TPN, , Intravenous, Q24H (TPN) **AND** fat emulsion (INTRALIPID,LIPOSYN) 20 %  infusion 50 g, 250 mL, Intravenous, Once per day on Mon Th, Shorty Whalen MD  •  benzonatate (TESSALON) capsule 100 mg, 100 mg, Oral, TID PRN, Shorty Whalen MD  •  busPIRone (BUSPAR) tablet 10 mg, 10 mg, Oral, Q12H, Shorty Whalen MD  •  dextrose (D50W) 25 g/ 50mL Intravenous Solution 25 g, 25 g, Intravenous, Q15 Min PRN, Shorty Whalen MD  •  dextrose (GLUTOSE) oral gel 15 g, 15 g, Oral, Q15 Min PRN, Shorty Whalen MD  •  furosemide (LASIX) injection 20 mg, 20 mg, Intravenous, BID, Shorty Whalen MD  •  glucagon (human recombinant) (GLUCAGEN DIAGNOSTIC) injection 1 mg, 1 mg, Subcutaneous, PRN, Shorty Whalen MD  •  guaiFENesin (ROBITUSSIN) 100 MG/5ML oral solution 200 mg, 200 mg, Oral, Q6H, Maribel Donaldson APRN  •  HYDROcodone-acetaminophen (NORCO) 5-325 MG per tablet 1 tablet, 1 tablet, Oral, Q4H PRN, Shorty Whalen MD  •  HYDROcodone-acetaminophen (NORCO) 5-325 MG per tablet 2 tablet, 2 tablet, Oral, Q4H PRN, Shorty Whalen MD  •  HYDROmorphone (DILAUDID) injection 0.5 mg, 0.5 mg, Intravenous, Q3H PRN, Maribel Donaldson, HANK  •  insulin lispro (humaLOG) injection 2-7 Units, 2-7 Units, Subcutaneous, Q6H, Shorty Whalen MD  •  ipratropium-albuterol (DUO-NEB) nebulizer solution 3 mL, 3 mL, Nebulization, Q4H While Awake - RT, Shorty Whalen MD  •  iron sucrose (VENOFER) 400 mg in sodium chloride 0.9 % 100 mL IVPB, 400 mg, Intravenous, Once **FOLLOWED BY** [] iron sucrose (VENOFER) 300 mg in sodium chloride 0.9 % 100 mL IVPB, 300 mg, Intravenous, Q24H, Octavia Fraser, APRN  •  lidocaine-Maalox-diphenhydramine (MIRACLE MOUTHWASH) oral suspension 5 mL, 5 mL, Swish & Swallow, 4x Daily PRN, Shorty Whalen MD  •  LORazepam (ATIVAN) injection 0.5 mg, 0.5 mg, Intravenous, Q8H PRN, Shorty Whalen MD  •  losartan (COZAAR) tablet 25 mg, 25 mg, Oral, Q24H, Shorty Whalen MD  •  magnesium hydroxide (MILK OF  "MAGNESIA) suspension 2400 mg/10mL 10 mL, 10 mL, Oral, Daily PRN, Octavia Fraser APRN  •  meropenem (MERREM) 1 g/100 mL 0.9% NS VTB (mbp), 1 g, Intravenous, Q8H, Shorty Whalen MD  •  metoprolol tartrate (LOPRESSOR) tablet 25 mg, 25 mg, Oral, Q12H, Shorty Whalen MD  •  nystatin (MYCOSTATIN) 337729 UNIT/ML suspension 500,000 Units, 5 mL, Swish & Swallow, 4x Daily, Shorty Whalen MD  •  ondansetron (ZOFRAN) injection 4 mg, 4 mg, Intravenous, Q6H PRN, Shorty Whalen MD  •  pantoprazole (PROTONIX) injection 40 mg, 40 mg, Intravenous, Q12H, Shorty Whalen MD  •  potassium chloride (MICRO-K) CR capsule 20 mEq, 20 mEq, Oral, Daily, Shorty Whalen MD  •  sodium chloride 0.9 % flush 10 mL, 10 mL, Intracatheter, Q8H, Shorty Whalen MD  •  sodium chloride 0.9 % flush 10 mL, 10 mL, Intracatheter, PRN, Shorty Whalen MD  •  vancomycin 1250 mg/250 mL 0.9% NS IVPB ([facility]), 1,250 mg, Intravenous, Q24H, Shorty Whalen MD  •  vitamin D (ERGOCALCIFEROL) capsule 50,000 Units, 50,000 Units, Oral, Q7 Days, Shorty Whalen MD     Data:      Code Status:    There are no questions and answers to display.         No family history on file.     Social History   Social History            Socioeconomic History   • Marital status: Single       Spouse name: Not on file   • Number of children: Not on file   • Years of education: Not on file   • Highest education level: Not on file            Vitals:  Ht 162.6 cm (64\")   Wt 64.5 kg (142 lb 4.8 oz)   BMI 24.43 kg/m²      T 98.5 P 83 R 16 /50  Sp02 96% (room air)        I/O (24Hr):       Labs and imaging:     Results for FABIOLA CEJA (MRN [id number]) as of 7/1/2019 10:30   Ref. Range 7/1/2019 00:11 7/1/2019 04:57 7/1/2019 06:07 7/1/2019 07:17   Glucose Latest Ref Range: 70 - 130 mg/dL 149 (H) 129 (H) 160 (H)    Sodium Latest Ref Range: 135 - 145 mmol/L  138     Potassium Latest Ref Range: 3.5 - 5.3 mmol/L "  4.5     CO2 Latest Ref Range: 24.0 - 31.0 mmol/L  25.0     Chloride Latest Ref Range: 98 - 110 mmol/L  105     Anion Gap Latest Ref Range: 4.0 - 13.0 mmol/L  8.0     Creatinine Latest Ref Range: 0.50 - 1.40 mg/dL  1.19     BUN Latest Ref Range: 5 - 21 mg/dL  57 (H)     BUN/Creatinine Ratio Latest Ref Range: 7.0 - 25.0   47.9 (H)     Calcium Latest Ref Range: 8.4 - 10.4 mg/dL  9.4     Ionized Calcium Latest Ref Range: 4.60 - 5.40 mg/dL    5.28   eGFR Non African Am Latest Ref Range: >60 mL/min/1.73  43 (L)     Alkaline Phosphatase Latest Ref Range: 24 - 120 U/L  108     Total Protein Latest Ref Range: 6.3 - 8.7 g/dL  6.2 (L)     ALT (SGPT) Latest Ref Range: 0 - 54 U/L  83 (H)     AST (SGOT) Latest Ref Range: 7 - 45 U/L  61 (H)     Total Bilirubin Latest Ref Range: 0.1 - 1.0 mg/dL  0.5     Albumin Latest Ref Range: 3.50 - 5.00 g/dL  3.20 (L)     Globulin Latest Units: gm/dL  3.0     A/G Ratio Latest Ref Range: 1.1 - 2.5 g/dL  1.1     Phosphorus Latest Ref Range: 2.5 - 4.5 mg/dL  4.2     C-Reactive Protein Latest Ref Range: 0.00 - 0.99 mg/dL  1.20 (H)     Magnesium Latest Ref Range: 1.4 - 2.2 mg/dL  2.2     Prealbumin Latest Ref Range: 18.0 - 36.0 mg/dL  39.9 (H)     WBC Latest Ref Range: 4.80 - 10.80 10*3/mm3  15.04 (H)     RBC Latest Ref Range: 4.20 - 5.40 10*6/mm3  3.51 (L)     Hemoglobin Latest Ref Range: 12.0 - 16.0 g/dL  9.8 (L)     Hematocrit Latest Ref Range: 37.0 - 47.0 %  30.3 (L)     RDW Latest Ref Range: 12.0 - 15.0 %  19.4 (H)     MCV Latest Ref Range: 82.0 - 98.0 fL  86.3     MCH Latest Ref Range: 28.0 - 32.0 pg  27.9 (L)     MCHC Latest Ref Range: 33.0 - 36.0 g/dL  32.3 (L)     MPV Latest Ref Range: 6.0 - 12.0 fL  9.4     Platelets Latest Ref Range: 130 - 400 10*3/mm3  277     RDW-SD Latest Ref Range: 40.0 - 54.0 fl  60.2 (H)     Neutrophil Rel % Latest Ref Range: 39.0 - 78.0 %  72.8     Lymphocyte Rel % Latest Ref Range: 15.0 - 45.0 %  11.8 (L)     Monocyte Rel % Latest Ref Range: 4.0 - 12.0 %  7.2      Eosinophil Rel % Latest Ref Range: 0.0 - 4.0 %  5.5 (H)     Basophil Rel % Latest Ref Range: 0.0 - 2.0 %  0.6     Immature Granulocyte Rel % Latest Ref Range: 0.0 - 5.0 %  2.1     Neutrophils Absolute Latest Ref Range: 1.87 - 8.40 10*3/mm3  10.95 (H)     Lymphocytes Absolute Latest Ref Range: 0.72 - 4.86 10*3/mm3  1.77     Monocytes Absolute Latest Ref Range: 0.19 - 1.30 10*3/mm3  1.09     Eosinophils Absolute Latest Ref Range: 0.00 - 0.70 10*3/mm3  0.82 (H)     Basophils Absolute Latest Ref Range: 0.00 - 0.20 10*3/mm3  0.09     Immature Grans, Absolute Latest Ref Range: 0.00 - 0.05 10*3/mm3  0.32 (H)     nRBC Latest Ref Range: 0.0 - 0.2 /100 WBC  0.0     Sed Rate Latest Ref Range: 0 - 20 mm/hr  12           Physical Examination:         Physical Exam   Constitutional: She is oriented to person, place, and time. She appears well-developed. No distress.   HENT:   Head: Normocephalic and atraumatic.   Mouth/Throat: Mucous membranes are moist. Oropharyngeal exudate improved.   Eyes: Conjunctivae and EOM are normal. Pupils are equal, round, and reactive to light.   Neck: Normal range of motion. Neck supple.   Cardiovascular: Normal rate, regular rhythm, normal heart sounds and intact distal pulses.   Pulmonary/Chest: Effort normal and breath sounds normal. No respiratory distress. She has no wheezes.   Abdominal: Soft. Bowel sounds are normal. There is no tenderness.   Abdominal Incision with wound vac in place   Musculoskeletal:   General weakness   Neurological: She is alert and oriented to person, place, and time.   Skin: Skin is warm and dry.   Psychiatric: She has a normal mood and affect. Her behavior is normal.   Nursing note and vitals reviewed.           Assessment:            Plan:         1. S/p Duodenal Ulcer perforation repair  2. Chronic Kidney Disease  3. HTN  4. Moderate Protein calorie malnutrition  5. Iron deficiency anemia  6. Sciatica   7. Anxiety  8. Stomatitis  9. Depression     Continue current  treatment.  Monitor counts. Increase activity.  Aggressive therapies  Continue gentle diuresis. Watch off antibiotics. Maintain patient safety. Continue TPN and lipids/nutritional support. Labs Thursday. Encourage increased po intake.       Electronically signed by HANK Vicente on 7/1/2019 at 10:31 AM  I have discussed the care of Suzy Larkin, including pertinent history and exam findings, with the nurse practitioner.    I have seen and examined the patient and the key elements of all parts of the encounter have been performed by me.  I agree with the assessment, plan and orders as documented by HANK Vicente, after I modified the exam findings and the plan of treatments and the final version is my approved version of the assessment.        Electronically signed by Shorty Whalen MD on 7/1/2019 at 7:45 PM

## 2019-07-02 LAB
GLUCOSE BLDC GLUCOMTR-MCNC: 138 MG/DL (ref 70–130)
GLUCOSE BLDC GLUCOMTR-MCNC: 141 MG/DL (ref 70–130)
GLUCOSE BLDC GLUCOMTR-MCNC: 147 MG/DL (ref 70–130)
GLUCOSE BLDC GLUCOMTR-MCNC: 152 MG/DL (ref 70–130)

## 2019-07-02 PROCEDURE — 92507 TX SP LANG VOICE COMM INDIV: CPT

## 2019-07-02 PROCEDURE — 97110 THERAPEUTIC EXERCISES: CPT

## 2019-07-02 PROCEDURE — 82962 GLUCOSE BLOOD TEST: CPT

## 2019-07-02 PROCEDURE — 97116 GAIT TRAINING THERAPY: CPT

## 2019-07-02 PROCEDURE — 25010000002 FUROSEMIDE PER 20 MG: Performed by: INTERNAL MEDICINE

## 2019-07-02 PROCEDURE — 63710000001 INSULIN LISPRO (HUMAN) PER 5 UNITS: Performed by: INTERNAL MEDICINE

## 2019-07-02 RX ORDER — PROPRANOLOL HYDROCHLORIDE 10 MG/1
10 TABLET ORAL DAILY
Status: DISCONTINUED | OUTPATIENT
Start: 2019-07-02 | End: 2019-07-08 | Stop reason: HOSPADM

## 2019-07-02 RX ORDER — MEGESTROL ACETATE 40 MG/ML
400 SUSPENSION ORAL 2 TIMES DAILY
Status: DISCONTINUED | OUTPATIENT
Start: 2019-07-02 | End: 2019-07-08 | Stop reason: HOSPADM

## 2019-07-02 NOTE — PROGRESS NOTES
Adult Nutrition  Assessment/PES    Patient Name:  Suzy Larkin  YOB: 1931  MRN: [id number]  Admit Date:  6/20/2019    Assessment Date:  7/2/2019    Comments:  Pt continues on TPN/lipids at this time. Spoke with HANK López re current TPN/lipids, it was decided to cut rate in half and then to dc after current bag runs out. Strongly encouraged pt to increase PO intake and stressed the importance as she would no longer be receiving TPN. Pt expressed understanding. States she enjoys the magic cups however chocolate makes her have diarrhea, adjusted supplement and diet message to not send chocolate anything. Will monitor PO intake and continue to follow.     Reason for Assessment     Row Name 07/02/19 1216          Reason for Assessment    Reason For Assessment  follow-up protocol         Nutrition/Diet History     Row Name 07/02/19 1216          Nutrition/Diet History    Typical Food/Fluid Intake  pt remains on TPN/lipids. discussed with HANK López current TPN/lipids, it was decided to wean TPN and to dc today. Strongly encouraged pt to increase PO intake and expressed to her the importance of it as she will not be receiving TPN after the current bag runs out.      Food Preferences  chocolate anything gives her diarrhea     Supplemental Drinks/Foods/Additives  pt enjoys the magic cup however reports chocolate makes her have diarrhea, will modify order         Anthropometrics     Row Name 07/02/19 1225          Usual Body Weight (UBW)    Weight Loss Time Frame  pts wt up to 138lb, 4lb gain compared to last assessment        Body Mass Index (BMI)    BMI Assessment  BMI 18.5-24.9: normal         Labs/Tests/Procedures/Meds     Row Name 07/02/19 1226          Labs/Procedures/Meds    Lab Results Reviewed  reviewed, pertinent        Diagnostic Tests/Procedures    Diagnostic Test/Procedure Reviewed  reviewed, pertinent     Diagnostic Test/Procedures Comments  TPN/lipids to be discontinued today          Medications    Pertinent Medications Reviewed  reviewed     Pertinent Medications Comments  megace increased to aid with PO intake         Physical Findings     Row Name 07/02/19 1227          Physical Findings    Oral/Mouth Cavity  mucositis     Skin  other (see comments);non-healing wound(s) abd incision with wound vac; neto score 19/20           Nutrition Prescription Ordered     Row Name 07/02/19 1229          Nutrition Prescription PO    Current PO Diet  Soft Texture     Texture  Chopped     Fluid Consistency  Thin     Supplement  Boost Plus (Ensure Enlive, Ensure Plus);Magic Cup     Supplement Frequency  3 times a day;Daily        Nutrition Prescription PN    PN Route  PICC instructed RN to cut rate down to 35ml/hr as TPN/lipids is being weaned and to be discontinued today     PN Goal Rate (mL/hr)  --     PN Current Rate (mL/hr)  35 mL/hr     PN Goal Volume (mL)  --     Dextrose Concentration (%)  15 %     Dextrose (Kcal)  918     Amino Acid Concentration (%)  5 %     Amino Acid (gm)  90     Lipid Concentration (%)  20%     Lipid Volume (mL)  250 mL     Lipid Frequency  Other (comment) 2x weekly         Evaluation of Received Nutrient/Fluid Intake     Row Name 07/02/19 1230          Nutrient/Fluid Evaluation    Number of Days Evaluated  3 days     Additional Documentation  Fluid Intake Evaluation (Group);Protein Evaluation (Group);Calories Evaluation (Group)        Fluid Intake Evaluation    Oral Fluid (mL)  773     IV Fluid (mL)  2295 IV/TPN/Lipids        PO Evaluation    Number of Days PO Intake Evaluated  3 days     Number of Meals  9     % PO Intake  25        PN Evaluation    Number of Days PN Evaluated  Insufficient Data               Problem/Interventions:  Problem 1     Row Name 07/02/19 1231          Nutrition Diagnoses Problem 1    Problem 1  Inadequate Nutrient Intake     Etiology (related to)  Medical Diagnosis;Factors Affecting Nutrition     Gastrointestinal  Duodenal ulcer perforated  duodenal ulcer s/p repair     Oral  Mouth Pain multiple mouth sores present-resolving     Signs/Symptoms (evidenced by)  Report of Mnimal PO Intake;PO diet not tolerated;Other (comment);PO Intake TPN/lipids being weaned and dc'd today     Percent (%) intake recorded  25 %     Over number of meals  9                 Intervention Goal     Row Name 07/02/19 1232          Intervention Goal    General  Maintain nutrition;Reduce/improve symptoms;Meet nutritional needs for age/condition;Disease management/therapy     PO  Increase intake;Meet estimated needs     Transition  PN to PO     Weight  Maintain weight         Nutrition Intervention     Row Name 07/02/19 1233          Nutrition Intervention    RD/Tech Action  Follow Tx progress;Care plan reviewd;Encourage intake;Interview for preference;Adjusted supplement         Nutrition Prescription     Row Name 07/02/19 1233          Nutrition Prescription PO    PO Prescription  Begin/change supplement     Supplement  Boost Plus;Magic Cup     Supplement Frequency  3 times a day;Daily     New PO Prescription Ordered?  Yes         Education/Evaluation     Row Name 07/02/19 1233          Education    Education  No discharge needs identified at this time        Monitor/Evaluation    Monitor  Per protocol           Electronically signed by:  Mary Viera  07/02/19 12:35 PM

## 2019-07-02 NOTE — PROGRESS NOTES
[facility]  THA Quiles M.D.  HANK Colin APRN        Internal Medicine Progress Note   7/2/2019 0955  Name:  Suzy Larkin  MRN:    [id number]                                                    Acct:     [id number]   Room:  [address] [place] [address] Day: 0     Admit Date: 6/12/2019  7:31 PM                               PCP: Provider, No Known     Subjective:      C/C: nutrition replacement and oxygen weaning     Interval History: Status: improved. Up to chair. No family at bedside. No c/o pain. Progressing with therapy.  Improved spirits.  BS stable. Continues to eat poorly.  States chocolate gives her diarrhea, will ask dietary to leave chocolate off of tray.  Patient c/o shaking in hands since her surgery, noted tremor when reaching for objects.    Review of Systems   Constitution: Positive for weakness and decreased appetite. Negative for chills, decreased fever.   HENT:  Negative for congestion, ear pain, nosebleeds and sore throat.    Eyes: Negative for blurred vision and discharge.   Cardiovascular: Negative for chest pain and palpitations.   Respiratory: Positive for COUGH Negative for Shortness of breath. Negative wheezing.    Endocrine: Negative for cold intolerance.   Hematologic/Lymphatic: Negative for adenopathy.   Skin: Negative for itching and rash.   Musculoskeletal: Positive for muscle weakness. Negative for arthritis and back pain.   Gastrointestinal: Positive for abdominal pain and bowel incontinence. Negative for constipation, diarrhea, nausea and vomiting.   Genitourinary: Negative for dysuria and urgency.   Neurological: Negative for dizziness and numbness.   Psychiatric/Behavioral: Negative for altered mental status and depression.             Medications:      Allergies: Allergies no known allergies     Current Meds:   Current Facility-Administered Medications:   •  acetaminophen (TYLENOL) tablet 325 mg, 325 mg, Oral, Q6H, Koby  Shorty Enrique MD  •  Adult Standard Central TPN, , Intravenous, Q24H (TPN) **AND** fat emulsion (INTRALIPID,LIPOSYN) 20 % infusion 50 g, 250 mL, Intravenous, Once per day on , Shorty Whalen MD  •  benzonatate (TESSALON) capsule 100 mg, 100 mg, Oral, TID PRN, Shorty Whalen MD  •  busPIRone (BUSPAR) tablet 10 mg, 10 mg, Oral, Q12H, Shorty Whalen MD  •  dextrose (D50W) 25 g/ 50mL Intravenous Solution 25 g, 25 g, Intravenous, Q15 Min PRN, Shorty Whalen MD  •  dextrose (GLUTOSE) oral gel 15 g, 15 g, Oral, Q15 Min PRN, Shorty Whalen MD  •  furosemide (LASIX) injection 20 mg, 20 mg, Intravenous, BID, Shorty Whalen MD  •  glucagon (human recombinant) (GLUCAGEN DIAGNOSTIC) injection 1 mg, 1 mg, Subcutaneous, PRN, Shorty Whalen MD  •  guaiFENesin (ROBITUSSIN) 100 MG/5ML oral solution 200 mg, 200 mg, Oral, Q6H, Maribel Donaldson, APRN  •  HYDROcodone-acetaminophen (NORCO) 5-325 MG per tablet 1 tablet, 1 tablet, Oral, Q4H PRN, Shorty Whalen MD  •  HYDROcodone-acetaminophen (NORCO) 5-325 MG per tablet 2 tablet, 2 tablet, Oral, Q4H PRN, Shorty Whalen MD  •  HYDROmorphone (DILAUDID) injection 0.5 mg, 0.5 mg, Intravenous, Q3H PRN, Maribel Donaldson, APRMK  •  insulin lispro (humaLOG) injection 2-7 Units, 2-7 Units, Subcutaneous, Q6H, Shorty Whalen MD  •  ipratropium-albuterol (DUO-NEB) nebulizer solution 3 mL, 3 mL, Nebulization, Q4H While Awake - RT, Shorty Whalen MD  •  iron sucrose (VENOFER) 400 mg in sodium chloride 0.9 % 100 mL IVPB, 400 mg, Intravenous, Once **FOLLOWED BY** [] iron sucrose (VENOFER) 300 mg in sodium chloride 0.9 % 100 mL IVPB, 300 mg, Intravenous, Q24H, Octavia Fraser, APRN  •  lidocaine-Maalox-diphenhydramine (MIRACLE MOUTHWASH) oral suspension 5 mL, 5 mL, Swish & Swallow, 4x Daily PRN, Shorty Whalen MD  •  LORazepam (ATIVAN) injection 0.5 mg, 0.5 mg, Intravenous, Q8H PRN, Shorty Whalen  "MD Klaus  •  losartan (COZAAR) tablet 25 mg, 25 mg, Oral, Q24H, Shorty Whalen MD  •  magnesium hydroxide (MILK OF MAGNESIA) suspension 2400 mg/10mL 10 mL, 10 mL, Oral, Daily PRN, Octavia Fraser APRN  •  meropenem (MERREM) 1 g/100 mL 0.9% NS VTB (mbp), 1 g, Intravenous, Q8H, Shorty Whalen MD  •  metoprolol tartrate (LOPRESSOR) tablet 25 mg, 25 mg, Oral, Q12H, Shorty Whalen MD  •  nystatin (MYCOSTATIN) 280003 UNIT/ML suspension 500,000 Units, 5 mL, Swish & Swallow, 4x Daily, Shorty Whalen MD  •  ondansetron (ZOFRAN) injection 4 mg, 4 mg, Intravenous, Q6H PRN, Shorty Whalen MD  •  pantoprazole (PROTONIX) injection 40 mg, 40 mg, Intravenous, Q12H, Shorty Whalen MD  •  potassium chloride (MICRO-K) CR capsule 20 mEq, 20 mEq, Oral, Daily, Shorty Whalen MD  •  sodium chloride 0.9 % flush 10 mL, 10 mL, Intracatheter, Q8H, Shorty Whalen MD  •  sodium chloride 0.9 % flush 10 mL, 10 mL, Intracatheter, PRN, Shorty Whalen MD  •  vancomycin 1250 mg/250 mL 0.9% NS IVPB ([facility]), 1,250 mg, Intravenous, Q24H, Shorty Whalen MD  •  vitamin D (ERGOCALCIFEROL) capsule 50,000 Units, 50,000 Units, Oral, Q7 Days, Shorty Whalen MD     Data:      Code Status:    There are no questions and answers to display.         No family history on file.     Social History   Social History            Socioeconomic History   • Marital status: Single       Spouse name: Not on file   • Number of children: Not on file   • Years of education: Not on file   • Highest education level: Not on file            Vitals:  Ht 162.6 cm (64\")   Wt 64.5 kg (142 lb 4.8 oz)   BMI 24.43 kg/m²      T 98.1 P 95 R 16 /48  Sp02 97% (room air)        I/O (24Hr):       Labs and imaging:     Results for FABIOLA CEJA (MRN [id number]) as of 7/2/2019 10:54   Ref. Range 7/1/2019 11:53 7/1/2019 16:47 7/1/2019 23:42 7/2/2019 05:32   Glucose Latest Ref Range: 70 - 130 " mg/dL 133 (H) 176 (H) 147 (H) 152 (H)         Physical Examination:         Physical Exam   Constitutional: She is oriented to person, place, and time. She appears well-developed. No distress.   HENT:   Head: Normocephalic and atraumatic.   Mouth/Throat: Mucous membranes are moist. Oropharyngeal exudate improved.   Eyes: Conjunctivae and EOM are normal. Pupils are equal, round, and reactive to light.   Neck: Normal range of motion. Neck supple.   Cardiovascular: Normal rate, regular rhythm, normal heart sounds and intact distal pulses.   Pulmonary/Chest: Effort normal and breath sounds normal. No respiratory distress. She has no wheezes.   Abdominal: Soft. Bowel sounds are normal. There is no tenderness.   Abdominal Incision with wound vac in place   Musculoskeletal:   General weakness   Neurological: She is alert and oriented to person, place, and time.   Skin: Skin is warm and dry.   Psychiatric: She has a normal mood and affect. Her behavior is normal.   Nursing note and vitals reviewed.           Assessment:            Plan:         1. S/p Duodenal Ulcer perforation repair  2. Chronic Kidney Disease  3. HTN  4. Moderate Protein calorie malnutrition  5. Iron deficiency anemia  6. Sciatica   7. Anxiety  8. Stomatitis  9. Depression     Continue current treatment.  Monitor counts. Increase activity.  Aggressive therapies  Continue gentle diuresis. Watch off antibiotics. Maintain patient safety. TPN to cut in half and then dc. Labs Thursday. Encourage increased po intake. Increase Megace.  Propranolol 10mg po daily.       Electronically signed by HANK Vicente on 7/2/2019 at 10:54 AM     I have discussed the care of Suzy Larkin, including pertinent history and exam findings, with the nurse practitioner.    I have seen and examined the patient and the key elements of all parts of the encounter have been performed by me.  I agree with the assessment, plan and orders as documented by Maribel  HANK Donaldson, after I modified the exam findings and the plan of treatments and the final version is my approved version of the assessment.        Electronically signed by Shorty Whalen MD on 7/2/2019 at 10:35 PM

## 2019-07-03 LAB
BACTERIA SPEC AEROBE CULT: ABNORMAL
BACTERIA SPEC AEROBE CULT: ABNORMAL
GLUCOSE BLDC GLUCOMTR-MCNC: 122 MG/DL (ref 70–130)
GLUCOSE BLDC GLUCOMTR-MCNC: 86 MG/DL (ref 70–130)
GLUCOSE BLDC GLUCOMTR-MCNC: 91 MG/DL (ref 70–130)
GRAM STN SPEC: ABNORMAL
GRAM STN SPEC: ABNORMAL
MAGNESIUM SERPL-MCNC: 2.1 MG/DL (ref 1.4–2.2)
PHOSPHATE SERPL-MCNC: 4.9 MG/DL (ref 2.5–4.5)

## 2019-07-03 PROCEDURE — 97535 SELF CARE MNGMENT TRAINING: CPT

## 2019-07-03 PROCEDURE — 82962 GLUCOSE BLOOD TEST: CPT

## 2019-07-03 PROCEDURE — 97116 GAIT TRAINING THERAPY: CPT

## 2019-07-03 PROCEDURE — 84100 ASSAY OF PHOSPHORUS: CPT | Performed by: INTERNAL MEDICINE

## 2019-07-03 PROCEDURE — 97605 NEG PRS WND THER DME<=50SQCM: CPT

## 2019-07-03 PROCEDURE — 83735 ASSAY OF MAGNESIUM: CPT | Performed by: INTERNAL MEDICINE

## 2019-07-03 PROCEDURE — 25010000002 FUROSEMIDE PER 20 MG: Performed by: INTERNAL MEDICINE

## 2019-07-03 PROCEDURE — 92507 TX SP LANG VOICE COMM INDIV: CPT | Performed by: SPEECH-LANGUAGE PATHOLOGIST

## 2019-07-03 RX ORDER — FUROSEMIDE 20 MG/1
20 TABLET ORAL DAILY
Status: DISCONTINUED | OUTPATIENT
Start: 2019-07-04 | End: 2019-07-08 | Stop reason: HOSPADM

## 2019-07-03 RX ORDER — IPRATROPIUM BROMIDE AND ALBUTEROL SULFATE 2.5; .5 MG/3ML; MG/3ML
3 SOLUTION RESPIRATORY (INHALATION) 2 TIMES DAILY PRN
Status: DISCONTINUED | OUTPATIENT
Start: 2019-07-03 | End: 2019-07-08 | Stop reason: HOSPADM

## 2019-07-03 NOTE — PROGRESS NOTES
[facility]  TAH Quiles M.D.  HANK Colin APRN        Internal Medicine Progress Note   7/3/2019 0955  Name:  Suzy Larkin  MRN:    [id number]                                                    Acct:     [id number]   Room:  [address] [place] [address] Day: 0     Admit Date: 6/12/2019  7:31 PM                               PCP: Provider, No Known     Subjective:      C/C: nutrition replacement and oxygen weaning     Interval History: Status: improved. Up to chair. No family at bedside. No c/o pain. Progressing with therapy.  Improved spirits.  BS stable. Improved oral intake. Witnessed patient walking down hallway with PT.       Review of Systems   Constitution: Positive for weakness and decreased appetite. Negative for chills, decreased fever.   HENT:  Negative for congestion, ear pain, nosebleeds and sore throat.    Eyes: Negative for blurred vision and discharge.   Cardiovascular: Negative for chest pain and palpitations.   Respiratory: Positive for COUGH Negative for Shortness of breath. Negative wheezing.    Endocrine: Negative for cold intolerance.   Hematologic/Lymphatic: Negative for adenopathy.   Skin: Negative for itching and rash.   Musculoskeletal: Positive for muscle weakness. Negative for arthritis and back pain.   Gastrointestinal: Positive for abdominal pain and bowel incontinence. Negative for constipation, diarrhea, nausea and vomiting.   Genitourinary: Negative for dysuria and urgency.   Neurological: Negative for dizziness and numbness.   Psychiatric/Behavioral: Negative for altered mental status and depression.             Medications:      Allergies: Allergies no known allergies     Current Meds:   Current Facility-Administered Medications:   •  acetaminophen (TYLENOL) tablet 325 mg, 325 mg, Oral, Q6H, Shorty Whalen MD  •  Adult Standard Central TPN, , Intravenous, Q24H (TPN) **AND** fat emulsion (INTRALIPID,LIPOSYN) 20 %  infusion 50 g, 250 mL, Intravenous, Once per day on Mon Th, Shorty Whalen MD  •  benzonatate (TESSALON) capsule 100 mg, 100 mg, Oral, TID PRN, Shorty Whalen MD  •  busPIRone (BUSPAR) tablet 10 mg, 10 mg, Oral, Q12H, Shorty Whalen MD  •  dextrose (D50W) 25 g/ 50mL Intravenous Solution 25 g, 25 g, Intravenous, Q15 Min PRN, Shorty Whalen MD  •  dextrose (GLUTOSE) oral gel 15 g, 15 g, Oral, Q15 Min PRN, Shorty Whalen MD  •  furosemide (LASIX) injection 20 mg, 20 mg, Intravenous, BID, Shorty Whalen MD  •  glucagon (human recombinant) (GLUCAGEN DIAGNOSTIC) injection 1 mg, 1 mg, Subcutaneous, PRN, Shorty Whalen MD  •  guaiFENesin (ROBITUSSIN) 100 MG/5ML oral solution 200 mg, 200 mg, Oral, Q6H, Maribel Donaldson APRN  •  HYDROcodone-acetaminophen (NORCO) 5-325 MG per tablet 1 tablet, 1 tablet, Oral, Q4H PRN, Shorty Whalen MD  •  HYDROcodone-acetaminophen (NORCO) 5-325 MG per tablet 2 tablet, 2 tablet, Oral, Q4H PRN, Shorty Whalen MD  •  HYDROmorphone (DILAUDID) injection 0.5 mg, 0.5 mg, Intravenous, Q3H PRN, Maribel Donaldson, HANK  •  insulin lispro (humaLOG) injection 2-7 Units, 2-7 Units, Subcutaneous, Q6H, Shorty Whalen MD  •  ipratropium-albuterol (DUO-NEB) nebulizer solution 3 mL, 3 mL, Nebulization, Q4H While Awake - RT, Shorty Whalen MD  •  iron sucrose (VENOFER) 400 mg in sodium chloride 0.9 % 100 mL IVPB, 400 mg, Intravenous, Once **FOLLOWED BY** [] iron sucrose (VENOFER) 300 mg in sodium chloride 0.9 % 100 mL IVPB, 300 mg, Intravenous, Q24H, Octavia Fraser, APRN  •  lidocaine-Maalox-diphenhydramine (MIRACLE MOUTHWASH) oral suspension 5 mL, 5 mL, Swish & Swallow, 4x Daily PRN, Shorty Whalen MD  •  LORazepam (ATIVAN) injection 0.5 mg, 0.5 mg, Intravenous, Q8H PRN, Shorty Whalen MD  •  losartan (COZAAR) tablet 25 mg, 25 mg, Oral, Q24H, Shorty Whalen MD  •  magnesium hydroxide (MILK OF  "MAGNESIA) suspension 2400 mg/10mL 10 mL, 10 mL, Oral, Daily PRN, Octavia Fraser APRN  •  meropenem (MERREM) 1 g/100 mL 0.9% NS VTB (mbp), 1 g, Intravenous, Q8H, Shorty Whalen MD  •  metoprolol tartrate (LOPRESSOR) tablet 25 mg, 25 mg, Oral, Q12H, Shorty Whalen MD  •  nystatin (MYCOSTATIN) 561263 UNIT/ML suspension 500,000 Units, 5 mL, Swish & Swallow, 4x Daily, Shorty Whalen MD  •  ondansetron (ZOFRAN) injection 4 mg, 4 mg, Intravenous, Q6H PRN, Shorty Whalen MD  •  pantoprazole (PROTONIX) injection 40 mg, 40 mg, Intravenous, Q12H, Shorty Whalen MD  •  potassium chloride (MICRO-K) CR capsule 20 mEq, 20 mEq, Oral, Daily, Shorty Whalen MD  •  sodium chloride 0.9 % flush 10 mL, 10 mL, Intracatheter, Q8H, Shorty Whalen MD  •  sodium chloride 0.9 % flush 10 mL, 10 mL, Intracatheter, PRN, Shorty Whalen MD  •  vancomycin 1250 mg/250 mL 0.9% NS IVPB ([facility]), 1,250 mg, Intravenous, Q24H, Shorty Whalen MD  •  vitamin D (ERGOCALCIFEROL) capsule 50,000 Units, 50,000 Units, Oral, Q7 Days, Shorty Whalen MD     Data:      Code Status:    There are no questions and answers to display.         No family history on file.     Social History   Social History            Socioeconomic History   • Marital status: Single       Spouse name: Not on file   • Number of children: Not on file   • Years of education: Not on file   • Highest education level: Not on file            Vitals:  Ht 162.6 cm (64\")   Wt 64.5 kg (142 lb 4.8 oz)   BMI 24.43 kg/m²      T 98.6 P 86 R 16 BP 92/55  Sp02 95% (room air)        I/O (24Hr):       Labs and imaging:     Results for FABIOLA CEJA (MRN [id number]) as of 7/3/2019 10:52   Ref. Range 7/2/2019 11:47 7/2/2019 16:58 7/2/2019 23:42 7/3/2019 04:42 7/3/2019 05:51   Glucose Latest Ref Range: 70 - 130 mg/dL 138 (H) 141 (H) 122  91   Phosphorus Latest Ref Range: 2.5 - 4.5 mg/dL    4.9 (H)    Magnesium Latest Ref " Range: 1.4 - 2.2 mg/dL    2.1        Physical Examination:         Physical Exam   Constitutional: She is oriented to person, place, and time. She appears well-developed. No distress.   HENT:   Head: Normocephalic and atraumatic.   Mouth/Throat: Mucous membranes are moist. Oropharyngeal exudate improved.   Eyes: Conjunctivae and EOM are normal. Pupils are equal, round, and reactive to light.   Neck: Normal range of motion. Neck supple.   Cardiovascular: Normal rate, regular rhythm, normal heart sounds and intact distal pulses.   Pulmonary/Chest: Effort normal and breath sounds normal. No respiratory distress. She has no wheezes.   Abdominal: Soft. Bowel sounds are normal. There is no tenderness.   Abdominal Incision with wound vac in place   Musculoskeletal:   General weakness   Neurological: She is alert and oriented to person, place, and time.   Skin: Skin is warm and dry.   Psychiatric: She has a normal mood and affect. Her behavior is normal.   Nursing note and vitals reviewed.           Assessment:            Plan:         1. S/p Duodenal Ulcer perforation repair  2. Chronic Kidney Disease  3. HTN  4. Moderate Protein calorie malnutrition  5. Iron deficiency anemia  6. Sciatica   7. Anxiety  8. Stomatitis  9. Depression     Continue current treatment.  Monitor counts. Increase activity.  Aggressive therapies  Continue gentle diuresis. Watch off antibiotics. Maintain patient safety. Labs Thursday. Encourage increased po intake. DC accu checks and SS insulin.  DC PICC.       Electronically signed by HANK Vicente on 7/3/2019 at 10:52 AM     I have discussed the care of Suzy Larkin, including pertinent history and exam findings, with the nurse practitioner.    I have seen and examined the patient and the key elements of all parts of the encounter have been performed by me.  I agree with the assessment, plan and orders as documented by HANK Vicente, after I modified the exam findings  and the plan of treatments and the final version is my approved version of the assessment.        Electronically signed by Shorty Whalen MD on 7/3/2019 at 8:26 PM

## 2019-07-04 PROCEDURE — 97116 GAIT TRAINING THERAPY: CPT

## 2019-07-04 PROCEDURE — 97530 THERAPEUTIC ACTIVITIES: CPT

## 2019-07-04 NOTE — PROGRESS NOTES
[facility]  THA Quiles M.D.  HANK Colin APRN        Internal Medicine Progress Note   7/4/2019 0955  Name:  Suzy Larkin  MRN:    [id number]                                                    Acct:     [id number]   Room:  [address] [place] [address] Day: 0     Admit Date: 6/12/2019  7:31 PM                               PCP: Provider, No Known     Subjective:      C/C: nutrition replacement and oxygen weaning     Interval History: Status: improved. Up to chair. No family at bedside. No c/o pain. Progressing with therapy. Continue to increase activity levels.      Review of Systems   Constitution: Positive for weakness and decreased appetite. Negative for chills, decreased fever.   HENT:  Negative for congestion, ear pain, nosebleeds and sore throat.    Eyes: Negative for blurred vision and discharge.   Cardiovascular: Negative for chest pain and palpitations.   Respiratory: Positive for COUGH Negative for Shortness of breath. Negative wheezing.    Endocrine: Negative for cold intolerance.   Hematologic/Lymphatic: Negative for adenopathy.   Skin: Negative for itching and rash.   Musculoskeletal: Positive for muscle weakness. Negative for arthritis and back pain.   Gastrointestinal: Positive for abdominal pain and bowel incontinence. Negative for constipation, diarrhea, nausea and vomiting.   Genitourinary: Negative for dysuria and urgency.   Neurological: Negative for dizziness and numbness.   Psychiatric/Behavioral: Negative for altered mental status and depression.             Medications:      Allergies: Allergies no known allergies     Current Meds:   Current Facility-Administered Medications:   •  acetaminophen (TYLENOL) tablet 325 mg, 325 mg, Oral, Q6H, Shorty Whalen MD  •  Adult Standard Central TPN, , Intravenous, Q24H (TPN) **AND** fat emulsion (INTRALIPID,LIPOSYN) 20 % infusion 50 g, 250 mL, Intravenous, Once per day on Mon Thu, Koby  Shorty Enrique MD  •  benzonatate (TESSALON) capsule 100 mg, 100 mg, Oral, TID PRN, Shorty Whalen MD  •  busPIRone (BUSPAR) tablet 10 mg, 10 mg, Oral, Q12H, Shorty Whalen MD  •  dextrose (D50W) 25 g/ 50mL Intravenous Solution 25 g, 25 g, Intravenous, Q15 Min PRN, Shorty Whalen MD  •  dextrose (GLUTOSE) oral gel 15 g, 15 g, Oral, Q15 Min PRN, Shorty Whalen MD  •  furosemide (LASIX) injection 20 mg, 20 mg, Intravenous, BID, Shorty Whalen MD  •  glucagon (human recombinant) (GLUCAGEN DIAGNOSTIC) injection 1 mg, 1 mg, Subcutaneous, PRN, Shorty Whalen MD  •  guaiFENesin (ROBITUSSIN) 100 MG/5ML oral solution 200 mg, 200 mg, Oral, Q6H, Maribel Donaldson, APRN  •  HYDROcodone-acetaminophen (NORCO) 5-325 MG per tablet 1 tablet, 1 tablet, Oral, Q4H PRN, Shorty Whalen MD  •  HYDROcodone-acetaminophen (NORCO) 5-325 MG per tablet 2 tablet, 2 tablet, Oral, Q4H PRN, Shorty Whalen MD  •  HYDROmorphone (DILAUDID) injection 0.5 mg, 0.5 mg, Intravenous, Q3H PRN, Maribel Donaldson, APRMK  •  insulin lispro (humaLOG) injection 2-7 Units, 2-7 Units, Subcutaneous, Q6H, Shorty Whalen MD  •  ipratropium-albuterol (DUO-NEB) nebulizer solution 3 mL, 3 mL, Nebulization, Q4H While Awake - RT, Shorty Whalen MD  •  iron sucrose (VENOFER) 400 mg in sodium chloride 0.9 % 100 mL IVPB, 400 mg, Intravenous, Once **FOLLOWED BY** [] iron sucrose (VENOFER) 300 mg in sodium chloride 0.9 % 100 mL IVPB, 300 mg, Intravenous, Q24H, Octavia Fraser, APRN  •  lidocaine-Maalox-diphenhydramine (MIRACLE MOUTHWASH) oral suspension 5 mL, 5 mL, Swish & Swallow, 4x Daily PRN, Shorty Whalen MD  •  LORazepam (ATIVAN) injection 0.5 mg, 0.5 mg, Intravenous, Q8H PRN, Shorty Whalen MD  •  losartan (COZAAR) tablet 25 mg, 25 mg, Oral, Q24H, Shorty Whalen MD  •  magnesium hydroxide (MILK OF MAGNESIA) suspension 2400 mg/10mL 10 mL, 10 mL, Oral, Daily PRN, Baker,  "Octavia R, APRN  •  meropenem (MERREM) 1 g/100 mL 0.9% NS VTB (mbp), 1 g, Intravenous, Q8H, Shorty Whalen MD  •  metoprolol tartrate (LOPRESSOR) tablet 25 mg, 25 mg, Oral, Q12H, Shorty Whalne MD  •  nystatin (MYCOSTATIN) 007743 UNIT/ML suspension 500,000 Units, 5 mL, Swish & Swallow, 4x Daily, Shorty Whalen MD  •  ondansetron (ZOFRAN) injection 4 mg, 4 mg, Intravenous, Q6H PRN, Shorty Whalen MD  •  pantoprazole (PROTONIX) injection 40 mg, 40 mg, Intravenous, Q12H, Shorty Whalen MD  •  potassium chloride (MICRO-K) CR capsule 20 mEq, 20 mEq, Oral, Daily, Shorty Whalen MD  •  sodium chloride 0.9 % flush 10 mL, 10 mL, Intracatheter, Q8H, Shorty Whalen MD  •  sodium chloride 0.9 % flush 10 mL, 10 mL, Intracatheter, PRN, Shorty Whalen MD  •  vancomycin 1250 mg/250 mL 0.9% NS IVPB ([facility]), 1,250 mg, Intravenous, Q24H, Shorty Whalen MD  •  vitamin D (ERGOCALCIFEROL) capsule 50,000 Units, 50,000 Units, Oral, Q7 Days, Shorty Whalen MD     Data:      Code Status:    There are no questions and answers to display.         No family history on file.     Social History   Social History            Socioeconomic History   • Marital status: Single       Spouse name: Not on file   • Number of children: Not on file   • Years of education: Not on file   • Highest education level: Not on file            Vitals:  Ht 162.6 cm (64\")   Wt 64.5 kg (142 lb 4.8 oz)   BMI 24.43 kg/m²      T 99.0 P 66 R 16 /50  Sp02 97% (room air)        I/O (24Hr):       Labs and imaging:           Physical Examination:         Physical Exam   Constitutional: She is oriented to person, place, and time. She appears well-developed. No distress.   HENT:   Head: Normocephalic and atraumatic.   Mouth/Throat: Mucous membranes are moist. Oropharyngeal exudate improved.   Eyes: Conjunctivae and EOM are normal. Pupils are equal, round, and reactive to light.   Neck: Normal range " of motion. Neck supple.   Cardiovascular: Normal rate, regular rhythm, normal heart sounds and intact distal pulses.   Pulmonary/Chest: Effort normal and breath sounds normal. No respiratory distress. She has no wheezes.   Abdominal: Soft. Bowel sounds are normal. There is no tenderness.   Abdominal Incision with wound vac in place   Musculoskeletal:   General weakness   Neurological: She is alert and oriented to person, place, and time.   Skin: Skin is warm and dry.   Psychiatric: She has a normal mood and affect. Her behavior is normal.   Nursing note and vitals reviewed.           Assessment:            Plan:         1. S/p Duodenal Ulcer perforation repair  2. Chronic Kidney Disease  3. HTN  4. Moderate Protein calorie malnutrition  5. Iron deficiency anemia  6. Sciatica   7. Anxiety  8. Stomatitis  9. Depression     Continue current treatment.  Monitor counts. Increase activity.  Aggressive therapies  Continue gentle diuresis. Watch off antibiotics. Maintain patient safety. Labs Monday. Encourage increased po intake.      Electronically signed by Shorty Whalen MD on 7/4/2019 at 8:50 AM

## 2019-07-05 LAB
MAGNESIUM SERPL-MCNC: 2 MG/DL (ref 1.4–2.2)
PHOSPHATE SERPL-MCNC: 4.4 MG/DL (ref 2.5–4.5)

## 2019-07-05 PROCEDURE — 83735 ASSAY OF MAGNESIUM: CPT | Performed by: INTERNAL MEDICINE

## 2019-07-05 PROCEDURE — 97530 THERAPEUTIC ACTIVITIES: CPT

## 2019-07-05 PROCEDURE — 97535 SELF CARE MNGMENT TRAINING: CPT

## 2019-07-05 PROCEDURE — 97164 PT RE-EVAL EST PLAN CARE: CPT

## 2019-07-05 PROCEDURE — 97116 GAIT TRAINING THERAPY: CPT

## 2019-07-05 PROCEDURE — 84100 ASSAY OF PHOSPHORUS: CPT | Performed by: INTERNAL MEDICINE

## 2019-07-05 PROCEDURE — 92507 TX SP LANG VOICE COMM INDIV: CPT

## 2019-07-05 PROCEDURE — 97605 NEG PRS WND THER DME<=50SQCM: CPT

## 2019-07-05 NOTE — PROGRESS NOTES
[facility]  THA Quiles M.D.  HANK Colin APRN        Internal Medicine Progress Note   7/5/2019 0955  Name:  Suzy Larkin  MRN:    [id number]                                                    Acct:     [id number]   Room:  [address] [place] [address] Day: 0     Admit Date: 6/12/2019  7:31 PM                               PCP: Provider, No Known     Subjective:      C/C: nutrition replacement and oxygen weaning     Interval History: Status: improved. Up to chair. No family at bedside. No c/o pain. Progressing with therapy. Continue to increase activity levels.      Review of Systems   Constitution: Positive for weakness and decreased appetite. Negative for chills, decreased fever.   HENT:  Negative for congestion, ear pain, nosebleeds and sore throat.    Eyes: Negative for blurred vision and discharge.   Cardiovascular: Negative for chest pain and palpitations.   Respiratory: Positive for COUGH Negative for Shortness of breath. Negative wheezing.    Endocrine: Negative for cold intolerance.   Hematologic/Lymphatic: Negative for adenopathy.   Skin: Negative for itching and rash.   Musculoskeletal: Positive for muscle weakness. Negative for arthritis and back pain.   Gastrointestinal: Positive for abdominal pain and bowel incontinence. Negative for constipation, diarrhea, nausea and vomiting.   Genitourinary: Negative for dysuria and urgency.   Neurological: Negative for dizziness and numbness.   Psychiatric/Behavioral: Negative for altered mental status and depression.             Medications:      Allergies: Allergies no known allergies     Current Meds:   Current Facility-Administered Medications:   •  acetaminophen (TYLENOL) tablet 325 mg, 325 mg, Oral, Q6H, Shorty Whalen MD  •  Adult Standard Central TPN, , Intravenous, Q24H (TPN) **AND** fat emulsion (INTRALIPID,LIPOSYN) 20 % infusion 50 g, 250 mL, Intravenous, Once per day on Mon Thu, Koby  Shorty Enrique MD  •  benzonatate (TESSALON) capsule 100 mg, 100 mg, Oral, TID PRN, Shorty Whalen MD  •  busPIRone (BUSPAR) tablet 10 mg, 10 mg, Oral, Q12H, Shorty Whalen MD  •  dextrose (D50W) 25 g/ 50mL Intravenous Solution 25 g, 25 g, Intravenous, Q15 Min PRN, Shorty Whalen MD  •  dextrose (GLUTOSE) oral gel 15 g, 15 g, Oral, Q15 Min PRN, Shorty Whalen MD  •  furosemide (LASIX) injection 20 mg, 20 mg, Intravenous, BID, Shorty Whalen MD  •  glucagon (human recombinant) (GLUCAGEN DIAGNOSTIC) injection 1 mg, 1 mg, Subcutaneous, PRN, Shorty Whalen MD  •  guaiFENesin (ROBITUSSIN) 100 MG/5ML oral solution 200 mg, 200 mg, Oral, Q6H, Maribel Donaldson, APRN  •  HYDROcodone-acetaminophen (NORCO) 5-325 MG per tablet 1 tablet, 1 tablet, Oral, Q4H PRN, Shorty Whalen MD  •  HYDROcodone-acetaminophen (NORCO) 5-325 MG per tablet 2 tablet, 2 tablet, Oral, Q4H PRN, Shorty Whalen MD  •  HYDROmorphone (DILAUDID) injection 0.5 mg, 0.5 mg, Intravenous, Q3H PRN, Maribel Donaldson, APRMK  •  insulin lispro (humaLOG) injection 2-7 Units, 2-7 Units, Subcutaneous, Q6H, Shorty Whalen MD  •  ipratropium-albuterol (DUO-NEB) nebulizer solution 3 mL, 3 mL, Nebulization, Q4H While Awake - RT, Shorty Whalen MD  •  iron sucrose (VENOFER) 400 mg in sodium chloride 0.9 % 100 mL IVPB, 400 mg, Intravenous, Once **FOLLOWED BY** [] iron sucrose (VENOFER) 300 mg in sodium chloride 0.9 % 100 mL IVPB, 300 mg, Intravenous, Q24H, Octavia Fraser, APRN  •  lidocaine-Maalox-diphenhydramine (MIRACLE MOUTHWASH) oral suspension 5 mL, 5 mL, Swish & Swallow, 4x Daily PRN, Shorty Whalen MD  •  LORazepam (ATIVAN) injection 0.5 mg, 0.5 mg, Intravenous, Q8H PRN, Shorty Whalen MD  •  losartan (COZAAR) tablet 25 mg, 25 mg, Oral, Q24H, Shorty Whalen MD  •  magnesium hydroxide (MILK OF MAGNESIA) suspension 2400 mg/10mL 10 mL, 10 mL, Oral, Daily PRN, Baker,  "Octavia R, APRN  •  meropenem (MERREM) 1 g/100 mL 0.9% NS VTB (mbp), 1 g, Intravenous, Q8H, Shorty Whalen MD  •  metoprolol tartrate (LOPRESSOR) tablet 25 mg, 25 mg, Oral, Q12H, Shorty Whalen MD  •  nystatin (MYCOSTATIN) 811883 UNIT/ML suspension 500,000 Units, 5 mL, Swish & Swallow, 4x Daily, Shorty Whalen MD  •  ondansetron (ZOFRAN) injection 4 mg, 4 mg, Intravenous, Q6H PRN, Shorty Whalen MD  •  pantoprazole (PROTONIX) injection 40 mg, 40 mg, Intravenous, Q12H, Shorty Whalen MD  •  potassium chloride (MICRO-K) CR capsule 20 mEq, 20 mEq, Oral, Daily, Shorty Whalen MD  •  sodium chloride 0.9 % flush 10 mL, 10 mL, Intracatheter, Q8H, Shorty Whalen MD  •  sodium chloride 0.9 % flush 10 mL, 10 mL, Intracatheter, PRN, Shorty Whalen MD  •  vancomycin 1250 mg/250 mL 0.9% NS IVPB ([facility]), 1,250 mg, Intravenous, Q24H, Shorty Whalen MD  •  vitamin D (ERGOCALCIFEROL) capsule 50,000 Units, 50,000 Units, Oral, Q7 Days, Shorty Whalen MD     Data:      Code Status:    There are no questions and answers to display.         No family history on file.     Social History   Social History            Socioeconomic History   • Marital status: Single       Spouse name: Not on file   • Number of children: Not on file   • Years of education: Not on file   • Highest education level: Not on file            Vitals:  Ht 162.6 cm (64\")   Wt 64.5 kg (142 lb 4.8 oz)   BMI 24.43 kg/m²      T 98.8 P 77 R 18 BP 88/56  Sp02 99% (room air)        I/O (24Hr):       Labs and imaging:           Physical Examination:         Physical Exam   Constitutional: She is oriented to person, place, and time. She appears well-developed. No distress.   HENT:   Head: Normocephalic and atraumatic.   Mouth/Throat: Mucous membranes are moist. Oropharyngeal exudate improved.   Eyes: Conjunctivae and EOM are normal. Pupils are equal, round, and reactive to light.   Neck: Normal range of " motion. Neck supple.   Cardiovascular: Normal rate, regular rhythm, normal heart sounds and intact distal pulses.   Pulmonary/Chest: Effort normal and breath sounds normal. No respiratory distress. She has no wheezes.   Abdominal: Soft. Bowel sounds are normal. There is no tenderness.   Abdominal Incision with wound vac in place   Musculoskeletal:   General weakness   Neurological: She is alert and oriented to person, place, and time.   Skin: Skin is warm and dry.   Psychiatric: She has a normal mood and affect. Her behavior is normal.   Nursing note and vitals reviewed.           Assessment:            Plan:         1. S/p Duodenal Ulcer perforation repair  2. Chronic Kidney Disease  3. HTN  4. Moderate Protein calorie malnutrition  5. Iron deficiency anemia  6. Sciatica   7. Anxiety  8. Stomatitis  9. Depression     Continue current treatment.  Monitor counts. Increase activity.  Aggressive therapies  Continue gentle diuresis. Watch off antibiotics. Maintain patient safety. Labs Monday. Encourage increased po intake.      Electronically signed by HANK Vicente on 7/5/2019 at 9:45 AM

## 2019-07-06 PROCEDURE — 97110 THERAPEUTIC EXERCISES: CPT

## 2019-07-06 PROCEDURE — 97116 GAIT TRAINING THERAPY: CPT

## 2019-07-06 NOTE — PROGRESS NOTES
[facility]  THA Quiles M.D.  HANK Colin APRN        Internal Medicine Progress Note   7/6/2019 1227  Name:  Suzy Larkin  MRN:    [id number]                                                    Acct:     [id number]   Room:  [address] [place] [address] Day: 0     Admit Date: 6/12/2019  7:31 PM                               PCP: Provider, No Known     Subjective:      C/C: nutrition replacement and oxygen weaning     Interval History: Status: improved. Up to chair. No family at bedside. No c/o pain. Progressing with therapy. Continue to increase activity levels. Continue to increase oral intake.       Review of Systems   Constitution: Positive for weakness and decreased appetite. Negative for chills, decreased fever.   HENT:  Negative for congestion, ear pain, nosebleeds and sore throat.    Eyes: Negative for blurred vision and discharge.   Cardiovascular: Negative for chest pain and palpitations.   Respiratory: Positive for COUGH Negative for Shortness of breath. Negative wheezing.    Endocrine: Negative for cold intolerance.   Hematologic/Lymphatic: Negative for adenopathy.   Skin: Negative for itching and rash.   Musculoskeletal: Positive for muscle weakness. Negative for arthritis and back pain.   Gastrointestinal: Positive for abdominal pain and bowel incontinence. Negative for constipation, diarrhea, nausea and vomiting.   Genitourinary: Negative for dysuria and urgency.   Neurological: Negative for dizziness and numbness.   Psychiatric/Behavioral: Negative for altered mental status and depression.             Medications:      Allergies: Allergies no known allergies     Current Meds:   Current Facility-Administered Medications:   •  acetaminophen (TYLENOL) tablet 325 mg, 325 mg, Oral, Q6H, Shorty Whalen MD  •  Adult Standard Central TPN, , Intravenous, Q24H (TPN) **AND** fat emulsion (INTRALIPID,LIPOSYN) 20 % infusion 50 g, 250 mL,  Intravenous, Once per day on , Shorty Whalen MD  •  benzonatate (TESSALON) capsule 100 mg, 100 mg, Oral, TID PRN, hSorty Whalen MD  •  busPIRone (BUSPAR) tablet 10 mg, 10 mg, Oral, Q12H, Shorty Whalen MD  •  dextrose (D50W) 25 g/ 50mL Intravenous Solution 25 g, 25 g, Intravenous, Q15 Min PRN, Shorty Whalen MD  •  dextrose (GLUTOSE) oral gel 15 g, 15 g, Oral, Q15 Min PRN, Shorty Whalen MD  •  furosemide (LASIX) injection 20 mg, 20 mg, Intravenous, BID, Shorty Whalen MD  •  glucagon (human recombinant) (GLUCAGEN DIAGNOSTIC) injection 1 mg, 1 mg, Subcutaneous, PRN, Shorty Whalen MD  •  guaiFENesin (ROBITUSSIN) 100 MG/5ML oral solution 200 mg, 200 mg, Oral, Q6H, Maribel Donaldson, APRN  •  HYDROcodone-acetaminophen (NORCO) 5-325 MG per tablet 1 tablet, 1 tablet, Oral, Q4H PRN, Shorty Whalen MD  •  HYDROcodone-acetaminophen (NORCO) 5-325 MG per tablet 2 tablet, 2 tablet, Oral, Q4H PRN, Shorty Whalen MD  •  HYDROmorphone (DILAUDID) injection 0.5 mg, 0.5 mg, Intravenous, Q3H PRN, Maribel Donaldson, APRN  •  insulin lispro (humaLOG) injection 2-7 Units, 2-7 Units, Subcutaneous, Q6H, Shorty Whalen MD  •  ipratropium-albuterol (DUO-NEB) nebulizer solution 3 mL, 3 mL, Nebulization, Q4H While Awake - RT, Shorty Whalen MD  •  iron sucrose (VENOFER) 400 mg in sodium chloride 0.9 % 100 mL IVPB, 400 mg, Intravenous, Once **FOLLOWED BY** [] iron sucrose (VENOFER) 300 mg in sodium chloride 0.9 % 100 mL IVPB, 300 mg, Intravenous, Q24H, Octavia Fraser, APRN  •  lidocaine-Maalox-diphenhydramine (MIRACLE MOUTHWASH) oral suspension 5 mL, 5 mL, Swish & Swallow, 4x Daily PRN, Shorty Whalen MD  •  LORazepam (ATIVAN) injection 0.5 mg, 0.5 mg, Intravenous, Q8H PRN, Shorty Whalen MD  •  losartan (COZAAR) tablet 25 mg, 25 mg, Oral, Q24H, Shorty Whalen MD  •  magnesium hydroxide (MILK OF MAGNESIA) suspension 2400  "mg/10mL 10 mL, 10 mL, Oral, Daily PRN, Octavia Fraser APRN  •  meropenem (MERREM) 1 g/100 mL 0.9% NS VTB (mbp), 1 g, Intravenous, Q8H, Shorty Whalen MD  •  metoprolol tartrate (LOPRESSOR) tablet 25 mg, 25 mg, Oral, Q12H, Shorty Whalen MD  •  nystatin (MYCOSTATIN) 160364 UNIT/ML suspension 500,000 Units, 5 mL, Swish & Swallow, 4x Daily, Shorty Whalen MD  •  ondansetron (ZOFRAN) injection 4 mg, 4 mg, Intravenous, Q6H PRN, Shorty Whalen MD  •  pantoprazole (PROTONIX) injection 40 mg, 40 mg, Intravenous, Q12H, Shorty Whalen MD  •  potassium chloride (MICRO-K) CR capsule 20 mEq, 20 mEq, Oral, Daily, Shorty Whalen MD  •  sodium chloride 0.9 % flush 10 mL, 10 mL, Intracatheter, Q8H, Shorty Whalen MD  •  sodium chloride 0.9 % flush 10 mL, 10 mL, Intracatheter, PRN, Shorty Whalen MD  •  vancomycin 1250 mg/250 mL 0.9% NS IVPB ([facility]), 1,250 mg, Intravenous, Q24H, Shorty Whalen MD  •  vitamin D (ERGOCALCIFEROL) capsule 50,000 Units, 50,000 Units, Oral, Q7 Days, Shorty Whalen MD     Data:      Code Status:    There are no questions and answers to display.         No family history on file.     Social History   Social History            Socioeconomic History   • Marital status: Single       Spouse name: Not on file   • Number of children: Not on file   • Years of education: Not on file   • Highest education level: Not on file            Vitals:  Ht 162.6 cm (64\")   Wt 64.5 kg (142 lb 4.8 oz)   BMI 24.43 kg/m²      T 97.9 P 80 R 14 /59  Sp02 99% (room air)        I/O (24Hr):       Labs and imaging:           Physical Examination:         Physical Exam   Constitutional: She is oriented to person, place, and time. She appears well-developed. No distress.   HENT:   Head: Normocephalic and atraumatic.   Mouth/Throat: Mucous membranes are moist. Oropharyngeal exudate improved.   Eyes: Conjunctivae and EOM are normal. Pupils are equal, round, " and reactive to light.   Neck: Normal range of motion. Neck supple.   Cardiovascular: Normal rate, regular rhythm, normal heart sounds and intact distal pulses.   Pulmonary/Chest: Effort normal and breath sounds normal. No respiratory distress. She has no wheezes.   Abdominal: Soft. Bowel sounds are normal. There is no tenderness.   Abdominal Incision with wound vac in place   Musculoskeletal:   General weakness   Neurological: She is alert and oriented to person, place, and time.   Skin: Skin is warm and dry.   Psychiatric: She has a normal mood and affect. Her behavior is normal.   Nursing note and vitals reviewed.           Assessment:            Plan:         1. S/p Duodenal Ulcer perforation repair  2. Chronic Kidney Disease  3. HTN  4. Moderate Protein calorie malnutrition  5. Iron deficiency anemia  6. Sciatica   7. Anxiety  8. Stomatitis  9. Depression     Continue current treatment.  Monitor counts. Increase activity.  Aggressive therapies  Continue gentle diuresis. Watch off antibiotics. Maintain patient safety. Labs Monday. Encourage increased po intake.      Electronically signed by Shorty Whalen MD on 7/6/2019 at 12:27 PM

## 2019-07-07 LAB
MAGNESIUM SERPL-MCNC: 1.8 MG/DL (ref 1.4–2.2)
PHOSPHATE SERPL-MCNC: 3.7 MG/DL (ref 2.5–4.5)

## 2019-07-07 PROCEDURE — 83735 ASSAY OF MAGNESIUM: CPT | Performed by: INTERNAL MEDICINE

## 2019-07-07 PROCEDURE — 84100 ASSAY OF PHOSPHORUS: CPT | Performed by: INTERNAL MEDICINE

## 2019-07-07 NOTE — PROGRESS NOTES
[facility]  THA Quiles M.D.  HANK Colin APRN        Internal Medicine Progress Note   7/7/2019 0946  Name:  Suzy Larkin  MRN:    [id number]                                                    Acct:     [id number]   Room:  [address] [place] [address] Day: 0     Admit Date: 6/12/2019  7:31 PM                               PCP: Provider, No Known     Subjective:      C/C: nutrition replacement and oxygen weaning     Interval History: Status: improved. Up to chair. No family at bedside. No c/o pain. Progressing with therapy. Continue to increase activity levels. Continue to increase oral intake. No new concerns    Review of Systems   Constitution: Positive for weakness and decreased appetite. Negative for chills, decreased fever.   HENT:  Negative for congestion, ear pain, nosebleeds and sore throat.    Eyes: Negative for blurred vision and discharge.   Cardiovascular: Negative for chest pain and palpitations.   Respiratory: Positive for COUGH Negative for Shortness of breath. Negative wheezing.    Endocrine: Negative for cold intolerance.   Hematologic/Lymphatic: Negative for adenopathy.   Skin: Negative for itching and rash.   Musculoskeletal: Positive for muscle weakness. Negative for arthritis and back pain.   Gastrointestinal: Positive for abdominal pain and bowel incontinence. Negative for constipation, diarrhea, nausea and vomiting.   Genitourinary: Negative for dysuria and urgency.   Neurological: Negative for dizziness and numbness.   Psychiatric/Behavioral: Negative for altered mental status and depression.             Medications:      Allergies: Allergies no known allergies     Current Meds:   Current Facility-Administered Medications:   •  acetaminophen (TYLENOL) tablet 325 mg, 325 mg, Oral, Q6H, Shorty Whalen MD  •  Adult Standard Central TPN, , Intravenous, Q24H (TPN) **AND** fat emulsion (INTRALIPID,LIPOSYN) 20 % infusion 50 g, 250  mL, Intravenous, Once per day on Mon Th, Shorty Whalen MD  •  benzonatate (TESSALON) capsule 100 mg, 100 mg, Oral, TID PRN, Shorty Whalen MD  •  busPIRone (BUSPAR) tablet 10 mg, 10 mg, Oral, Q12H, Shorty Whalen MD  •  dextrose (D50W) 25 g/ 50mL Intravenous Solution 25 g, 25 g, Intravenous, Q15 Min PRN, Shorty Whalen MD  •  dextrose (GLUTOSE) oral gel 15 g, 15 g, Oral, Q15 Min PRN, Shorty Whalen MD  •  furosemide (LASIX) injection 20 mg, 20 mg, Intravenous, BID, Shorty Whalen MD  •  glucagon (human recombinant) (GLUCAGEN DIAGNOSTIC) injection 1 mg, 1 mg, Subcutaneous, PRN, Shorty Whalen MD  •  guaiFENesin (ROBITUSSIN) 100 MG/5ML oral solution 200 mg, 200 mg, Oral, Q6H, Maribel Donaldson, HANK  •  HYDROcodone-acetaminophen (NORCO) 5-325 MG per tablet 1 tablet, 1 tablet, Oral, Q4H PRN, Shorty Whalen MD  •  HYDROcodone-acetaminophen (NORCO) 5-325 MG per tablet 2 tablet, 2 tablet, Oral, Q4H PRN, Shorty Whalen MD  •  HYDROmorphone (DILAUDID) injection 0.5 mg, 0.5 mg, Intravenous, Q3H PRN, Maribel Donaldson, HANK  •  insulin lispro (humaLOG) injection 2-7 Units, 2-7 Units, Subcutaneous, Q6H, Shorty Whalen MD  •  ipratropium-albuterol (DUO-NEB) nebulizer solution 3 mL, 3 mL, Nebulization, Q4H While Awake - RT, Shorty Whalen MD  •  iron sucrose (VENOFER) 400 mg in sodium chloride 0.9 % 100 mL IVPB, 400 mg, Intravenous, Once **FOLLOWED BY** [] iron sucrose (VENOFER) 300 mg in sodium chloride 0.9 % 100 mL IVPB, 300 mg, Intravenous, Q24H, Octavia Fraser, APRN  •  lidocaine-Maalox-diphenhydramine (MIRACLE MOUTHWASH) oral suspension 5 mL, 5 mL, Swish & Swallow, 4x Daily PRN, Shorty Whalen MD  •  LORazepam (ATIVAN) injection 0.5 mg, 0.5 mg, Intravenous, Q8H PRN, Shorty Whalen MD  •  losartan (COZAAR) tablet 25 mg, 25 mg, Oral, Q24H, Shorty Whalen MD  •  magnesium hydroxide (MILK OF MAGNESIA) suspension  "2400 mg/10mL 10 mL, 10 mL, Oral, Daily PRN, Octavia Fraser APRN  •  meropenem (MERREM) 1 g/100 mL 0.9% NS VTB (mbp), 1 g, Intravenous, Q8H, Shorty Whalen MD  •  metoprolol tartrate (LOPRESSOR) tablet 25 mg, 25 mg, Oral, Q12H, Shorty Whalen MD  •  nystatin (MYCOSTATIN) 439601 UNIT/ML suspension 500,000 Units, 5 mL, Swish & Swallow, 4x Daily, Shorty Whalen MD  •  ondansetron (ZOFRAN) injection 4 mg, 4 mg, Intravenous, Q6H PRN, Shorty Whalen MD  •  pantoprazole (PROTONIX) injection 40 mg, 40 mg, Intravenous, Q12H, Shorty Whalen MD  •  potassium chloride (MICRO-K) CR capsule 20 mEq, 20 mEq, Oral, Daily, Shorty Whalen MD  •  sodium chloride 0.9 % flush 10 mL, 10 mL, Intracatheter, Q8H, Shorty Whalen MD  •  sodium chloride 0.9 % flush 10 mL, 10 mL, Intracatheter, PRN, Shorty Whalen MD  •  vancomycin 1250 mg/250 mL 0.9% NS IVPB ([facility]), 1,250 mg, Intravenous, Q24H, Shorty Whalen MD  •  vitamin D (ERGOCALCIFEROL) capsule 50,000 Units, 50,000 Units, Oral, Q7 Days, Shorty Whalen MD     Data:      Code Status:    There are no questions and answers to display.         No family history on file.     Social History   Social History            Socioeconomic History   • Marital status: Single       Spouse name: Not on file   • Number of children: Not on file   • Years of education: Not on file   • Highest education level: Not on file            Vitals:  Ht 162.6 cm (64\")   Wt 64.5 kg (142 lb 4.8 oz)   BMI 24.43 kg/m²      T 98.5 P 72 R 16 /66  Sp02 97% (room air)        I/O (24Hr):       Labs and imaging:           Physical Examination:         Physical Exam   Constitutional: She is oriented to person, place, and time. She appears well-developed. No distress.   HENT:   Head: Normocephalic and atraumatic.   Mouth/Throat: Mucous membranes are moist. Oropharyngeal exudate improved.   Eyes: Conjunctivae and EOM are normal. Pupils are equal, " round, and reactive to light.   Neck: Normal range of motion. Neck supple.   Cardiovascular: Normal rate, regular rhythm, normal heart sounds and intact distal pulses.   Pulmonary/Chest: Effort normal and breath sounds normal. No respiratory distress. She has no wheezes.   Abdominal: Soft. Bowel sounds are normal. There is no tenderness.   Abdominal Incision with wound vac in place   Musculoskeletal:   General weakness   Neurological: She is alert and oriented to person, place, and time.   Skin: Skin is warm and dry.   Psychiatric: She has a normal mood and affect. Her behavior is normal.   Nursing note and vitals reviewed.           Assessment:            Plan:         1. S/p Duodenal Ulcer perforation repair  2. Chronic Kidney Disease  3. HTN  4. Moderate Protein calorie malnutrition  5. Iron deficiency anemia  6. Sciatica   7. Anxiety  8. Stomatitis  9. Depression     Continue current treatment.  Monitor counts. Increase activity.  Aggressive therapies  Continue gentle diuresis. Watch off antibiotics. Maintain patient safety. Labs in am. Encourage increased po intake. Remove PICC      Electronically signed by HANK Vicente on 7/7/2019 at 9:46 AM

## 2019-07-08 VITALS — WEIGHT: 128.1 LBS | BODY MASS INDEX: 21.99 KG/M2

## 2019-07-08 LAB
ALBUMIN SERPL-MCNC: 3.2 G/DL (ref 3.5–5)
ALBUMIN/GLOB SERPL: 1.1 G/DL (ref 1.1–2.5)
ALP SERPL-CCNC: 74 U/L (ref 24–120)
ALT SERPL W P-5'-P-CCNC: 29 U/L (ref 0–54)
ANION GAP SERPL CALCULATED.3IONS-SCNC: 6 MMOL/L (ref 4–13)
AST SERPL-CCNC: 32 U/L (ref 7–45)
BASOPHILS # BLD AUTO: 0.06 10*3/MM3 (ref 0–0.2)
BASOPHILS NFR BLD AUTO: 0.5 % (ref 0–2)
BILIRUB SERPL-MCNC: 0.5 MG/DL (ref 0.1–1)
BUN BLD-MCNC: 31 MG/DL (ref 5–21)
BUN/CREAT SERPL: 24 (ref 7–25)
CALCIUM SPEC-SCNC: 9.6 MG/DL (ref 8.4–10.4)
CHLORIDE SERPL-SCNC: 109 MMOL/L (ref 98–110)
CO2 SERPL-SCNC: 22 MMOL/L (ref 24–31)
CREAT BLD-MCNC: 1.29 MG/DL (ref 0.5–1.4)
CRP SERPL-MCNC: 2.21 MG/DL (ref 0–0.99)
DEPRECATED RDW RBC AUTO: 58.4 FL (ref 40–54)
EOSINOPHIL # BLD AUTO: 0.83 10*3/MM3 (ref 0–0.7)
EOSINOPHIL NFR BLD AUTO: 7.1 % (ref 0–4)
ERYTHROCYTE [DISTWIDTH] IN BLOOD BY AUTOMATED COUNT: 18.7 % (ref 12–15)
ERYTHROCYTE [SEDIMENTATION RATE] IN BLOOD: 19 MM/HR (ref 0–20)
GFR SERPL CREATININE-BSD FRML MDRD: 39 ML/MIN/1.73
GLOBULIN UR ELPH-MCNC: 2.9 GM/DL
GLUCOSE BLD-MCNC: 88 MG/DL (ref 70–100)
HCT VFR BLD AUTO: 28.9 % (ref 37–47)
HGB BLD-MCNC: 9.5 G/DL (ref 12–16)
IMM GRANULOCYTES # BLD AUTO: 0.08 10*3/MM3 (ref 0–0.05)
IMM GRANULOCYTES NFR BLD AUTO: 0.7 % (ref 0–5)
LYMPHOCYTES # BLD AUTO: 1.64 10*3/MM3 (ref 0.72–4.86)
LYMPHOCYTES NFR BLD AUTO: 14.1 % (ref 15–45)
MCH RBC QN AUTO: 28.3 PG (ref 28–32)
MCHC RBC AUTO-ENTMCNC: 32.9 G/DL (ref 33–36)
MCV RBC AUTO: 86 FL (ref 82–98)
MONOCYTES # BLD AUTO: 0.88 10*3/MM3 (ref 0.19–1.3)
MONOCYTES NFR BLD AUTO: 7.6 % (ref 4–12)
NEUTROPHILS # BLD AUTO: 8.13 10*3/MM3 (ref 1.87–8.4)
NEUTROPHILS NFR BLD AUTO: 70 % (ref 39–78)
NRBC BLD AUTO-RTO: 0 /100 WBC (ref 0–0.2)
PLATELET # BLD AUTO: 259 10*3/MM3 (ref 130–400)
PMV BLD AUTO: 9.9 FL (ref 6–12)
POTASSIUM BLD-SCNC: 4.6 MMOL/L (ref 3.5–5.3)
PREALB SERPL-MCNC: 31.5 MG/DL (ref 18–36)
PROT SERPL-MCNC: 6.1 G/DL (ref 6.3–8.7)
RBC # BLD AUTO: 3.36 10*6/MM3 (ref 4.2–5.4)
SODIUM BLD-SCNC: 137 MMOL/L (ref 135–145)
WBC NRBC COR # BLD: 11.62 10*3/MM3 (ref 4.8–10.8)

## 2019-07-08 PROCEDURE — 86140 C-REACTIVE PROTEIN: CPT | Performed by: INTERNAL MEDICINE

## 2019-07-08 PROCEDURE — 85025 COMPLETE CBC W/AUTO DIFF WBC: CPT | Performed by: INTERNAL MEDICINE

## 2019-07-08 PROCEDURE — 80053 COMPREHEN METABOLIC PANEL: CPT | Performed by: INTERNAL MEDICINE

## 2019-07-08 PROCEDURE — 85651 RBC SED RATE NONAUTOMATED: CPT | Performed by: INTERNAL MEDICINE

## 2019-07-08 PROCEDURE — 97535 SELF CARE MNGMENT TRAINING: CPT | Performed by: OCCUPATIONAL THERAPIST

## 2019-07-08 PROCEDURE — 84134 ASSAY OF PREALBUMIN: CPT | Performed by: INTERNAL MEDICINE

## 2019-07-08 PROCEDURE — 97530 THERAPEUTIC ACTIVITIES: CPT

## 2019-07-08 NOTE — DISCHARGE SUMMARY
"[facility]  Juan R Whalen M.D.  VASILIY Whalen M.D.  HANK Colin APRN    Internal Medicine Discharge Summary    Patient ID: Suzy Larkin  MRN: [id number]     Acct:  [id number]       Patient's PCP: Gian Jasso APRN    Admit Date: 6/13/19    Discharge Date:   7/8/19    Admitting Physician: Shorty Whalen MD    Discharge Physician: HANK Sarkar     Active Discharge Diagnoses:    1. S/p Duodenal Ulcer perforation repair  2. Chronic Kidney Disease  3. HTN  4. Moderate Protein calorie malnutrition  5. Iron deficiency anemia  6. Sciatica   7. Anxiety  8. Stomatitis  9. Depression      Hospital Problems    * No active hospital problems. *   No past medical history on file.    The patient was seen and examined on the day of discharge and this discharge summary is in conjunction with any daily progress note from day of discharge.    Code Status:    There are no questions and answers to display.       Hospital Course: The patient had been in her usual state of health when she developed increased back pain and \"sciatica\". She began taking ibuprofen and increased doses as it did not control the pain. She developed upper abdominal pain with nausea and vomiting and normal bowel movements and presented to ER with her complaints. At that facility, CT showed free air and free fluid in the upper abdomen. A JUN drain was placed and she transferred to [facility] for high level of care and surgical consultation. She was found to have evidence of perforated ulcer. She was treated with IV fluids and IV antibiotics. She underwent exploratory laparotomy with Garrett patch repair of perforated duodenal ulcer per Dr. Machado on 6/1. Post-operatively, the patient had decreased urine output and was found to have evidence of acute renal failure secondary to ATN. Nephrology was consulted and renal function improved with continued hydration. The patient had some " mild post-op confusion that resolved. She was transfused with 1 unit PRBC on POD#4 due to hgb 7.1 with dyspnea and tachycardia. Nutrition was started on POD#4 by way of TPN. She had continued complaints of throat and mouth irritation. The patient was started on clear liquids and diet was slowly advanced. She developed urinary retention with dysuria requiring in and out caths. The patient developed fluid volume overload with hypoxia and required diuresis with improvement in her symptoms. Due to her continued weakness and need for continued monitoring and nutrition support as well as wound care, she transferred to our facility.   The patient continued on TPN and had continued complaints of odynophagia interfering with po intake. She developed increased drainage from abdominal wound and this was cultured initially showing no growth - final culture positive for candida albicans and lactobacillus casei. Due to increased drainage from the wound and concern for possible dehiscence, the patient was transferred to [facility] for further evaluation and treatment per general surgery. CT scan showed intact fascia with no evidence of dehiscence. She underwent bedside incision and drainage with copious amounts of purulent drainage extracted. She responded well to treatment and transferred back to our facility for continued treatment. She progressed with therapy. TPN and lipids were weaned and discontinued. The patient has had less oral pain. She did develop some worsening confusion that improved with medication adjustment. At this point, the patient is felt medically stable for discharge to home with family.       Consults:  Dr. Clifton (wound care)    Disposition: home health     Physical Exam   Constitutional: She is oriented to person, place, and time. She appears well-developed. No distress.   HENT:   Head: Normocephalic and atraumatic.   Mouth/Throat: Mucous membranes are moist. Oropharyngeal exudate improved.    Eyes: Conjunctivae and EOM are normal. Pupils are equal, round, and reactive to light.   Neck: Normal range of motion. Neck supple.   Cardiovascular: Normal rate, regular rhythm, normal heart sounds and intact distal pulses.   Pulmonary/Chest: Effort normal and breath sounds normal. No respiratory distress. She has no wheezes.   Abdominal: Soft. Bowel sounds are normal. There is no tenderness.   Dressing c.d.i  Musculoskeletal:   General weakness   Neurological: She is alert and oriented to person, place, and time.   Skin: Skin is warm and dry.   Psychiatric: She has a normal mood and affect. Her behavior is normal.   Nursing note and vitals reviewed.       Discharged Condition: Stable    Follow Up: PCP 1 week    Diet: Diet Regular; Thin    Discharge Medications:   See computer generated medication reconciliation form    Time Spent on discharge is  32 minutes in patient examination, evaluation, patient/family counseling as well as medication reconciliation, prescriptions for required medications, discharge plan and follow up.     Electronically signed by HANK Sarkar on 7/8/2019 at 10:41 AM   I have discussed the care of Suzy Larkin, including pertinent history and exam findings, with the nurse practitioner.    I have seen and examined the patient and the key elements of all parts of the encounter have been performed by me.  I agree with the assessment, plan and orders as documented by HANK Colin, after I modified the exam findings and the plan of treatments and the final version is my approved version of the assessment.        Electronically signed by Shorty Whalen MD on 7/8/2019 at 4:50 PM

## 2019-07-22 LAB
FUNGUS (MYCOLOGY) CULTURE: ABNORMAL
FUNGUS (MYCOLOGY) CULTURE: ABNORMAL
KOH PREP: ABNORMAL
ORGANISM: ABNORMAL

## 2025-04-15 NOTE — PROGRESS NOTES
[facility]  THA Quiles M.D.  HANK Colin APRN      Internal Medicine Progress Note    6/17/2019   10:00 AM    Name:  Suzy Larkin  MRN:    [id number]     Acct:     [id number]   Room:  [address] [place] [address] Day: 0     Admit Date: 6/12/2019  7:31 PM  PCP: Provider, No Known    Subjective:     C/C: nutrition replacement and oxygen weaning    Interval History: Status: stayed the same. Up to commode. No family at bedside. Progressing with therapy. Still with mouth discomfort/odynophagia. Tolerating TPN. Counts stable. No new concerns.     Review of Systems   Constitution: Positive for weakness. Negative for chills, decreased appetite and fever.   HENT: Positive for odynophagia. Negative for congestion, ear pain, nosebleeds and sore throat.    Eyes: Negative for blurred vision and discharge.   Cardiovascular: Negative for chest pain and palpitations.   Respiratory: Negative for cough, shortness of breath and wheezing.    Endocrine: Negative for cold intolerance.   Hematologic/Lymphatic: Negative for adenopathy.   Skin: Negative for itching and rash.   Musculoskeletal: Positive for back pain and muscle weakness. Negative for arthritis.   Gastrointestinal: Positive for abdominal pain and bowel incontinence. Negative for constipation, diarrhea, nausea and vomiting.   Genitourinary: Negative for dysuria and urgency.   Neurological: Negative for dizziness and numbness.   Psychiatric/Behavioral: Negative for altered mental status and depression. The patient has insomnia.          Medications:     Allergies: Allergies no known allergies    Current Meds:   Current Facility-Administered Medications:   •  acetaminophen (TYLENOL) tablet 325 mg, 325 mg, Oral, Q6H, Shorty Whalen MD  •  Adult Standard Central TPN, , Intravenous, Q24H (TPN) **AND** fat emulsion (INTRALIPID,LIPOSYN) 20 % infusion 50 g, 250 mL, Intravenous, Once per day on Mon Thu, Shorty Whalen  Group Topic: BH Check-in/Symptom Rating    Date: 4/14/2025  Start Time: 1930  End Time: 2030  Facilitators: Alexandra Crowe [facility]    Focus: Safety DC Planning/Community Wrap Up  Number in attendance: 12    Method: Group  Attendance: Present  Participation: Moderate  Patient Response: Able to return demonstration, Appropriate feedback, Attentive, Good eye contact, Interested in topic, Interactive, Poor eye contact, and Quiet  Mood: Anxious, Depressed, and Sad  Affect: Type: Anxious, Depressed, and Euthymic (normal mood)   Range: Blunted/flat   Congruency: Congruent   Stability: Stable  Behavior/Socialization: Appropriate to group, Cooperative, Passive, and Supportive  Thought Process: Circumstantial and Focused  Task Performance: Follows directions  Patient Evaluation: Encouragement - needs prompts     MD Klaus  •  benzonatate (TESSALON) capsule 100 mg, 100 mg, Oral, TID PRN, Shorty Whalen MD  •  busPIRone (BUSPAR) tablet 10 mg, 10 mg, Oral, Q12H, Shorty Whalen MD  •  dextrose (D50W) 25 g/ 50mL Intravenous Solution 25 g, 25 g, Intravenous, Q15 Min PRN, Shorty Whalen MD  •  dextrose (GLUTOSE) oral gel 15 g, 15 g, Oral, Q15 Min PRN, Shorty Whalen MD  •  furosemide (LASIX) injection 20 mg, 20 mg, Intravenous, BID, Shorty Whalen MD  •  glucagon (human recombinant) (GLUCAGEN DIAGNOSTIC) injection 1 mg, 1 mg, Subcutaneous, PRN, Shorty Whalen MD  •  guaiFENesin (MUCINEX) 12 hr tablet 600 mg, 600 mg, Oral, Q12H, Octavia Fraser, APRN  •  HYDROcodone-acetaminophen (NORCO) 5-325 MG per tablet 1 tablet, 1 tablet, Oral, Q4H PRN, Shorty Whalen MD  •  HYDROcodone-acetaminophen (NORCO) 5-325 MG per tablet 2 tablet, 2 tablet, Oral, Q4H PRN, Shorty Whalen MD  •  HYDROmorphone (DILAUDID) injection 0.25 mg, 0.25 mg, Intravenous, Q3H PRN, Shorty Whalen MD  •  insulin lispro (humaLOG) injection 2-7 Units, 2-7 Units, Subcutaneous, Q6H, Shorty Whalen MD  •  ipratropium-albuterol (DUO-NEB) nebulizer solution 3 mL, 3 mL, Nebulization, Q4H While Awake - RT, Shorty Whalen MD  •  iron sucrose (VENOFER) 400 mg in sodium chloride 0.9 % 100 mL IVPB, 400 mg, Intravenous, Once **FOLLOWED BY** iron sucrose (VENOFER) 300 mg in sodium chloride 0.9 % 100 mL IVPB, 300 mg, Intravenous, Q24H, Octavia Fraser APRMK  •  lidocaine-Maalox-diphenhydramine (MIRACLE MOUTHWASH) oral suspension 5 mL, 5 mL, Swish & Swallow, 4x Daily PRN, Shorty Whalen MD  •  losartan (COZAAR) tablet 25 mg, 25 mg, Oral, Q24H, Shorty Whalen MD  •  magnesium hydroxide (MILK OF MAGNESIA) suspension 2400 mg/10mL 10 mL, 10 mL, Oral, Daily PRN, Octavia Fraser APRN  •  meropenem (MERREM) 1 g/100 mL 0.9% NS VTB (mbp), 1 g, Intravenous, Q8H, Shorty Whalen MD  •   "metoprolol tartrate (LOPRESSOR) tablet 25 mg, 25 mg, Oral, Q12H, Shorty Whalen MD  •  morphine injection 0.5 mg, 0.5 mg, Intravenous, Q30 Min PRN, Shorty Whalen MD  •  nystatin (MYCOSTATIN) 050644 UNIT/ML suspension 500,000 Units, 5 mL, Swish & Swallow, 4x Daily, Shorty Whalen MD  •  ondansetron (ZOFRAN) injection 4 mg, 4 mg, Intravenous, Q6H PRN, Shorty Whalen MD  •  pantoprazole (PROTONIX) injection 40 mg, 40 mg, Intravenous, Q12H, Shorty Whalen MD  •  potassium chloride (MICRO-K) CR capsule 20 mEq, 20 mEq, Oral, Daily, Shorty Whalen MD  •  sodium chloride 0.9 % flush 10 mL, 10 mL, Intracatheter, Q8H, Shorty Whalen MD  •  sodium chloride 0.9 % flush 10 mL, 10 mL, Intracatheter, PRN, Shorty Whalen MD  •  [START ON 6/18/2019] vancomycin 1250 mg/250 mL 0.9% NS IVPB ([facility]), 1,250 mg, Intravenous, Q24H, Shorty Whalen MD  •  vitamin D (ERGOCALCIFEROL) capsule 50,000 Units, 50,000 Units, Oral, Q7 Days, Shorty Whalen MD    Data:     Code Status:    There are no questions and answers to display.       No family history on file.    Social History     Socioeconomic History   • Marital status: Single     Spouse name: Not on file   • Number of children: Not on file   • Years of education: Not on file   • Highest education level: Not on file       Vitals:  Ht 162.6 cm (64\")   Wt 64.5 kg (142 lb 4.8 oz)   BMI 24.43 kg/m²     T 97.8 P 87 R 22 /60  Sp02 96% (2 lpm)      I/O (24Hr):  No intake or output data in the 24 hours ending 06/17/19 1000    Labs and imaging:      Lab Results (last 24 hours)     Procedure Component Value Units Date/Time    Sedimentation Rate [[id number]]  (Normal) Collected:  06/17/19 0409    Specimen:  Blood Updated:  06/17/19 0513     Sed Rate 15 mm/hr     Basic Metabolic Panel [[id number]]  (Abnormal) Collected:  06/17/19 0409    Specimen:  Blood Updated:  06/17/19 0500     Glucose 136 mg/dL      BUN 28 mg/dL     "  Creatinine 0.82 mg/dL      Sodium 141 mmol/L      Potassium 3.6 mmol/L      Chloride 105 mmol/L      CO2 30.0 mmol/L      Calcium 8.6 mg/dL      eGFR Non African Amer 66 mL/min/1.73      BUN/Creatinine Ratio 34.1     Anion Gap 6.0 mmol/L     Narrative:       GFR Normal >60  Chronic Kidney Disease <60  Kidney Failure <15    C-reactive Protein [[id number]]  (Abnormal) Collected:  06/17/19 0409    Specimen:  Blood Updated:  06/17/19 0500     C-Reactive Protein 3.70 mg/dL     CBC & Differential [[id number]] Collected:  06/17/19 0409    Specimen:  Blood Updated:  06/17/19 0448    Narrative:       The following orders were created for panel order CBC & Differential.  Procedure                               Abnormality         Status                     ---------                               -----------         ------                     CBC Auto Differential[[id number]]        Abnormal            Final result                 Please view results for these tests on the individual orders.    CBC Auto Differential [[id number]]  (Abnormal) Collected:  06/17/19 0409    Specimen:  Blood Updated:  06/17/19 0448     WBC 12.02 10*3/mm3      RBC 2.90 10*6/mm3      Hemoglobin 7.8 g/dL      Hematocrit 25.4 %      MCV 87.6 fL      MCH 26.9 pg      MCHC 30.7 g/dL      RDW 17.1 %      RDW-SD 53.2 fl      MPV 10.1 fL      Platelets 472 10*3/mm3      Neutrophil % 74.0 %      Lymphocyte % 11.8 %      Monocyte % 6.3 %      Eosinophil % 6.2 %      Basophil % 0.2 %      Immature Grans % 1.5 %      Neutrophils, Absolute 8.90 10*3/mm3      Lymphocytes, Absolute 1.42 10*3/mm3      Monocytes, Absolute 0.76 10*3/mm3      Eosinophils, Absolute 0.74 10*3/mm3      Basophils, Absolute 0.02 10*3/mm3      Immature Grans, Absolute 0.18 10*3/mm3      nRBC 0.0 /100 WBC     Vancomycin, Trough [[id number]]  (Abnormal) Collected:  06/16/19 2150    Specimen:  Blood Updated:  06/16/19 2233     Vancomycin Trough 20.07 mcg/mL     POC Glucose Once [[id number]]   (Abnormal) Collected:  06/16/19 1728    Specimen:  Blood Updated:  06/16/19 1746     Glucose 147 mg/dL      Comment: : EM Cardenas KristaMeter ID: [id number]       POC Glucose Once [[id number]]  (Abnormal) Collected:  06/16/19 1155    Specimen:  Blood Updated:  06/16/19 1233     Glucose 146 mg/dL      Comment: : EM Cardenas KristaMeter ID: [id number]               Xr Chest 1 View    Result Date: 6/12/2019  Narrative: EXAMINATION: XR CHEST 1 VW-. 6/12/2019 8:35 PM CDT  CHEST, ONE VIEW:  HISTORY: Central venous catheter placement  COMPARISON: None  A single frontal chest radiograph was obtained.  FINDINGS:  Right IJ deep line well-positioned in superior vena cava region without pneumothorax.  There is patchy bilateral perihilar and lower lobe infiltrates with left lower lobe consolidation.  Acute infectious/inflammatory change, pneumonia considered as well as pulmonary edema.                                  Impression: 1. Right IJ deep line well-positioned without pneumothorax or postprocedural complications. 2. Bilateral parenchymal infiltrates.  This report was finalized on 06/12/2019 20:36 by Dr. Hever Williamson MD.        Physical Examination:        Physical Exam   Constitutional: She is oriented to person, place, and time. She appears well-developed. No distress.   HENT:   Head: Normocephalic and atraumatic.   Mouth/Throat: Mucous membranes are dry. Oropharyngeal exudate present.   Lips dry, cracked, bleeding   Eyes: Conjunctivae and EOM are normal. Pupils are equal, round, and reactive to light.   Neck: Normal range of motion. Neck supple.   Cardiovascular: Normal rate, regular rhythm, normal heart sounds and intact distal pulses.   Pulmonary/Chest: Effort normal and breath sounds normal. No respiratory distress. She has no wheezes.   Abdominal: Soft. Bowel sounds are normal. There is no tenderness.   Abdominal Incision with scant serous drainage   Musculoskeletal:   General weakness    Neurological: She is alert and oriented to person, place, and time.   Skin: Skin is warm and dry.   Psychiatric: She has a normal mood and affect. Her behavior is normal.   Nursing note and vitals reviewed.        Assessment:          Plan:        1. S/p Duodenal Ulcer perforation repair  2. Chronic Kidney Disease  3. HTN  4. Moderate Protein calorie malnutrition  5. Iron deficiency anemia  6. Sciatica   7. Anxiety  8. Stomatitis    Continue current treatment.  Monitor counts. Increase activity.  Aggressive therapies  Continue antibiotics and gentle diuresis. Maintain patient safety. Continue TPN and lipids/nutritional support. Labs Thursday. Miracle mouthwash and frequent oral care. Decrease accu checks.       Electronically signed by HANK Sarkar on 6/17/2019 at 10:00 AM     I have discussed the care of Suzy Larkin, including pertinent history and exam findings, with the nurse practitioner.    I have seen and examined the patient and the key elements of all parts of the encounter have been performed by me.  I agree with the assessment, plan and orders as documented by HANK Colin, after I modified the exam findings and the plan of treatments and the final version is my approved version of the assessment.        Electronically signed by Shorty Whalen MD on 6/17/2019 at 1:38 PM

## (undated) DEVICE — DRAIN SURG 15FR 100% SIL RND END PERF

## (undated) DEVICE — POOLE SUCTION INSTRUMENT WITH REMOVABLE SHEATH: Brand: POOLE

## (undated) DEVICE — SUTURE PERMAHAND SZ 3-0 L18IN NONABSORBABLE BLK L26MM SH C013D

## (undated) DEVICE — SUTURE VCRL SZ 3-0 L27IN ABSRB UD L26MM SH 1/2 CIR J416H

## (undated) DEVICE — STERILE POLYISOPRENE POWDER-FREE SURGICAL GLOVES: Brand: PROTEXIS

## (undated) DEVICE — TIBURON LAPAROTOMY DRAPE: Brand: CONVERTORS

## (undated) DEVICE — MAJOR CDS

## (undated) DEVICE — SOLUTION IV IRRIG POUR BRL 0.9% SODIUM CHL 2F7124

## (undated) DEVICE — RESERVOIR,SUCTION,100CC,SILICONE: Brand: MEDLINE

## (undated) DEVICE — SUTURE PDS + SZ 1 L96IN ABSRB VLT L65MM TP-1 1/2 CIR PDP880G

## (undated) DEVICE — SUTURE VCRL SZ 0 L54IN ABSRB UD LIGAPAK REEL NDL J287G